# Patient Record
Sex: FEMALE | Race: WHITE | NOT HISPANIC OR LATINO | Employment: FULL TIME | URBAN - METROPOLITAN AREA
[De-identification: names, ages, dates, MRNs, and addresses within clinical notes are randomized per-mention and may not be internally consistent; named-entity substitution may affect disease eponyms.]

---

## 2017-09-07 ENCOUNTER — TRANSCRIBE ORDERS (OUTPATIENT)
Dept: ADMINISTRATIVE | Facility: HOSPITAL | Age: 47
End: 2017-09-07

## 2017-09-07 DIAGNOSIS — M54.16 LUMBAR RADICULITIS: Primary | ICD-10-CM

## 2018-04-30 ENCOUNTER — TRANSCRIBE ORDERS (OUTPATIENT)
Dept: ADMINISTRATIVE | Facility: HOSPITAL | Age: 48
End: 2018-04-30

## 2018-04-30 DIAGNOSIS — Z12.31 VISIT FOR SCREENING MAMMOGRAM: Primary | ICD-10-CM

## 2018-05-02 ENCOUNTER — HOSPITAL ENCOUNTER (OUTPATIENT)
Dept: MAMMOGRAPHY | Facility: HOSPITAL | Age: 48
Discharge: HOME | End: 2018-05-02
Attending: INTERNAL MEDICINE | Admitting: INTERNAL MEDICINE

## 2018-05-02 DIAGNOSIS — Z12.31 VISIT FOR SCREENING MAMMOGRAM: ICD-10-CM

## 2018-05-02 PROCEDURE — 77063 BREAST TOMOSYNTHESIS BI: CPT

## 2018-05-02 PROCEDURE — 77067 SCR MAMMO BI INCL CAD: CPT | Performed by: RADIOLOGY

## 2018-05-02 PROCEDURE — 77063 BREAST TOMOSYNTHESIS BI: CPT | Performed by: RADIOLOGY

## 2018-05-02 PROCEDURE — 77067 SCR MAMMO BI INCL CAD: CPT

## 2018-06-01 ENCOUNTER — TELEPHONE (OUTPATIENT)
Dept: FAMILY MEDICINE CLINIC | Facility: CLINIC | Age: 48
End: 2018-06-01

## 2018-06-01 ENCOUNTER — APPOINTMENT (OUTPATIENT)
Dept: GENERAL RADIOLOGY | Facility: HOSPITAL | Age: 48
End: 2018-06-01

## 2018-06-01 ENCOUNTER — APPOINTMENT (OUTPATIENT)
Dept: CT IMAGING | Facility: HOSPITAL | Age: 48
End: 2018-06-01

## 2018-06-01 ENCOUNTER — HOSPITAL ENCOUNTER (OUTPATIENT)
Facility: HOSPITAL | Age: 48
Discharge: HOME | End: 2018-06-04
Attending: EMERGENCY MEDICINE | Admitting: INTERNAL MEDICINE

## 2018-06-01 DIAGNOSIS — R09.02 HYPOXIA: ICD-10-CM

## 2018-06-01 DIAGNOSIS — R10.826 EPIGASTRIC ABDOMINAL TENDERNESS WITH REBOUND TENDERNESS: ICD-10-CM

## 2018-06-01 DIAGNOSIS — Z98.84 HX OF GASTRIC BYPASS: ICD-10-CM

## 2018-06-01 DIAGNOSIS — R52 INTRACTABLE PAIN: Primary | ICD-10-CM

## 2018-06-01 DIAGNOSIS — R07.89 ATYPICAL CHEST PAIN: ICD-10-CM

## 2018-06-01 DIAGNOSIS — R11.0 NAUSEA: ICD-10-CM

## 2018-06-01 DIAGNOSIS — R07.9 CHEST PAIN, UNSPECIFIED TYPE: ICD-10-CM

## 2018-06-01 DIAGNOSIS — J96.01 ACUTE RESPIRATORY FAILURE WITH HYPOXIA (HCC): ICD-10-CM

## 2018-06-01 PROBLEM — E66.9 OBESITY: Chronic | Status: ACTIVE | Noted: 2018-06-01

## 2018-06-01 PROBLEM — G89.29 CHRONIC PAIN: Chronic | Status: ACTIVE | Noted: 2018-06-01

## 2018-06-01 PROBLEM — F32.A DEPRESSION: Chronic | Status: ACTIVE | Noted: 2018-06-01

## 2018-06-01 LAB
ALBUMIN SERPL-MCNC: 4.3 G/DL (ref 3.2–4.8)
ALBUMIN/GLOB SERPL: 1.3 G/DL (ref 1.5–2.5)
ALP SERPL-CCNC: 102 U/L (ref 25–100)
ALT SERPL W P-5'-P-CCNC: 23 U/L (ref 7–40)
ANION GAP SERPL CALCULATED.3IONS-SCNC: 9 MMOL/L (ref 3–11)
AST SERPL-CCNC: 29 U/L (ref 0–33)
B-HCG UR QL: NEGATIVE
BASOPHILS # BLD AUTO: 0.03 10*3/MM3 (ref 0–0.2)
BASOPHILS NFR BLD AUTO: 0.4 % (ref 0–1)
BILIRUB SERPL-MCNC: 0.4 MG/DL (ref 0.3–1.2)
BNP SERPL-MCNC: 9 PG/ML (ref 0–100)
BUN BLD-MCNC: 20 MG/DL (ref 9–23)
BUN/CREAT SERPL: 22.2 (ref 7–25)
CALCIUM SPEC-SCNC: 9.4 MG/DL (ref 8.7–10.4)
CHLORIDE SERPL-SCNC: 104 MMOL/L (ref 99–109)
CO2 SERPL-SCNC: 26 MMOL/L (ref 20–31)
CREAT BLD-MCNC: 0.9 MG/DL (ref 0.6–1.3)
D DIMER PPP FEU-MCNC: 0.26 MG/L (FEU) (ref 0–0.5)
DEPRECATED RDW RBC AUTO: 37.5 FL (ref 37–54)
EOSINOPHIL # BLD AUTO: 0.21 10*3/MM3 (ref 0–0.3)
EOSINOPHIL NFR BLD AUTO: 3 % (ref 0–3)
ERYTHROCYTE [DISTWIDTH] IN BLOOD BY AUTOMATED COUNT: 12.4 % (ref 11.3–14.5)
GFR SERPL CREATININE-BSD FRML MDRD: 67 ML/MIN/1.73
GLOBULIN UR ELPH-MCNC: 3.2 GM/DL
GLUCOSE BLD-MCNC: 96 MG/DL (ref 70–100)
HCT VFR BLD AUTO: 43.7 % (ref 34.5–44)
HGB BLD-MCNC: 15.4 G/DL (ref 11.5–15.5)
HOLD SPECIMEN: NORMAL
HOLD SPECIMEN: NORMAL
IMM GRANULOCYTES # BLD: 0.01 10*3/MM3 (ref 0–0.03)
IMM GRANULOCYTES NFR BLD: 0.1 % (ref 0–0.6)
INTERNAL NEGATIVE CONTROL: NEGATIVE
INTERNAL POSITIVE CONTROL: POSITIVE
LIPASE SERPL-CCNC: 29 U/L (ref 6–51)
LYMPHOCYTES # BLD AUTO: 2.66 10*3/MM3 (ref 0.6–4.8)
LYMPHOCYTES NFR BLD AUTO: 38.2 % (ref 24–44)
Lab: NORMAL
MCH RBC QN AUTO: 29.4 PG (ref 27–31)
MCHC RBC AUTO-ENTMCNC: 35.2 G/DL (ref 32–36)
MCV RBC AUTO: 83.6 FL (ref 80–99)
MONOCYTES # BLD AUTO: 0.39 10*3/MM3 (ref 0–1)
MONOCYTES NFR BLD AUTO: 5.6 % (ref 0–12)
NEUTROPHILS # BLD AUTO: 3.68 10*3/MM3 (ref 1.5–8.3)
NEUTROPHILS NFR BLD AUTO: 52.8 % (ref 41–71)
PLATELET # BLD AUTO: 229 10*3/MM3 (ref 150–450)
PMV BLD AUTO: 10.6 FL (ref 6–12)
POTASSIUM BLD-SCNC: 4.4 MMOL/L (ref 3.5–5.5)
PROT SERPL-MCNC: 7.5 G/DL (ref 5.7–8.2)
RBC # BLD AUTO: 5.23 10*6/MM3 (ref 3.89–5.14)
SODIUM BLD-SCNC: 139 MMOL/L (ref 132–146)
TROPONIN I SERPL-MCNC: 0 NG/ML (ref 0–0.07)
TROPONIN I SERPL-MCNC: 0 NG/ML (ref 0–0.07)
WBC NRBC COR # BLD: 6.97 10*3/MM3 (ref 3.5–10.8)
WHOLE BLOOD HOLD SPECIMEN: NORMAL
WHOLE BLOOD HOLD SPECIMEN: NORMAL

## 2018-06-01 PROCEDURE — 99285 EMERGENCY DEPT VISIT HI MDM: CPT

## 2018-06-01 PROCEDURE — 25010000002 HYDROMORPHONE PER 4 MG: Performed by: EMERGENCY MEDICINE

## 2018-06-01 PROCEDURE — 71045 X-RAY EXAM CHEST 1 VIEW: CPT

## 2018-06-01 PROCEDURE — 83690 ASSAY OF LIPASE: CPT | Performed by: EMERGENCY MEDICINE

## 2018-06-01 PROCEDURE — 96375 TX/PRO/DX INJ NEW DRUG ADDON: CPT

## 2018-06-01 PROCEDURE — 25010000002 FENTANYL CITRATE (PF) 100 MCG/2ML SOLUTION: Performed by: EMERGENCY MEDICINE

## 2018-06-01 PROCEDURE — G0378 HOSPITAL OBSERVATION PER HR: HCPCS

## 2018-06-01 PROCEDURE — 85379 FIBRIN DEGRADATION QUANT: CPT | Performed by: PHYSICIAN ASSISTANT

## 2018-06-01 PROCEDURE — 25010000002 DICYCLOMINE PER 20 MG: Performed by: PHYSICIAN ASSISTANT

## 2018-06-01 PROCEDURE — 94760 N-INVAS EAR/PLS OXIMETRY 1: CPT

## 2018-06-01 PROCEDURE — 80053 COMPREHEN METABOLIC PANEL: CPT | Performed by: EMERGENCY MEDICINE

## 2018-06-01 PROCEDURE — 93005 ELECTROCARDIOGRAM TRACING: CPT | Performed by: EMERGENCY MEDICINE

## 2018-06-01 PROCEDURE — 84484 ASSAY OF TROPONIN QUANT: CPT

## 2018-06-01 PROCEDURE — 93005 ELECTROCARDIOGRAM TRACING: CPT | Performed by: PHYSICIAN ASSISTANT

## 2018-06-01 PROCEDURE — 99220 PR INITIAL OBSERVATION CARE/DAY 70 MINUTES: CPT | Performed by: INTERNAL MEDICINE

## 2018-06-01 PROCEDURE — 84145 PROCALCITONIN (PCT): CPT | Performed by: NURSE PRACTITIONER

## 2018-06-01 PROCEDURE — 25010000002 ONDANSETRON PER 1 MG: Performed by: EMERGENCY MEDICINE

## 2018-06-01 PROCEDURE — 0 IOPAMIDOL PER 1 ML: Performed by: EMERGENCY MEDICINE

## 2018-06-01 PROCEDURE — 85025 COMPLETE CBC W/AUTO DIFF WBC: CPT | Performed by: EMERGENCY MEDICINE

## 2018-06-01 PROCEDURE — 71275 CT ANGIOGRAPHY CHEST: CPT

## 2018-06-01 PROCEDURE — 82550 ASSAY OF CK (CPK): CPT | Performed by: NURSE PRACTITIONER

## 2018-06-01 PROCEDURE — 96374 THER/PROPH/DIAG INJ IV PUSH: CPT

## 2018-06-01 PROCEDURE — 94640 AIRWAY INHALATION TREATMENT: CPT

## 2018-06-01 PROCEDURE — 94799 UNLISTED PULMONARY SVC/PX: CPT

## 2018-06-01 PROCEDURE — 83880 ASSAY OF NATRIURETIC PEPTIDE: CPT | Performed by: EMERGENCY MEDICINE

## 2018-06-01 PROCEDURE — 96372 THER/PROPH/DIAG INJ SC/IM: CPT

## 2018-06-01 RX ORDER — FENTANYL CITRATE 50 UG/ML
50 INJECTION, SOLUTION INTRAMUSCULAR; INTRAVENOUS ONCE
Status: COMPLETED | OUTPATIENT
Start: 2018-06-01 | End: 2018-06-01

## 2018-06-01 RX ORDER — IPRATROPIUM BROMIDE AND ALBUTEROL SULFATE 2.5; .5 MG/3ML; MG/3ML
3 SOLUTION RESPIRATORY (INHALATION) ONCE
Status: COMPLETED | OUTPATIENT
Start: 2018-06-01 | End: 2018-06-01

## 2018-06-01 RX ORDER — HYDROMORPHONE HYDROCHLORIDE 1 MG/ML
0.5 INJECTION, SOLUTION INTRAMUSCULAR; INTRAVENOUS; SUBCUTANEOUS ONCE
Status: COMPLETED | OUTPATIENT
Start: 2018-06-01 | End: 2018-06-01

## 2018-06-01 RX ORDER — ALUMINA, MAGNESIA, AND SIMETHICONE 2400; 2400; 240 MG/30ML; MG/30ML; MG/30ML
15 SUSPENSION ORAL ONCE
Status: COMPLETED | OUTPATIENT
Start: 2018-06-01 | End: 2018-06-01

## 2018-06-01 RX ORDER — DIAZEPAM 5 MG/1
5 TABLET ORAL ONCE
Status: COMPLETED | OUTPATIENT
Start: 2018-06-01 | End: 2018-06-01

## 2018-06-01 RX ORDER — NITROGLYCERIN 0.4 MG/1
0.4 TABLET SUBLINGUAL
Status: DISCONTINUED | OUTPATIENT
Start: 2018-06-01 | End: 2018-06-04 | Stop reason: HOSPADM

## 2018-06-01 RX ORDER — GABAPENTIN 600 MG/1
600 TABLET ORAL 2 TIMES DAILY
COMMUNITY
End: 2019-05-02

## 2018-06-01 RX ORDER — SODIUM CHLORIDE 0.9 % (FLUSH) 0.9 %
10 SYRINGE (ML) INJECTION AS NEEDED
Status: DISCONTINUED | OUTPATIENT
Start: 2018-06-01 | End: 2018-06-04 | Stop reason: HOSPADM

## 2018-06-01 RX ORDER — ASPIRIN 81 MG/1
324 TABLET, CHEWABLE ORAL ONCE
Status: COMPLETED | OUTPATIENT
Start: 2018-06-01 | End: 2018-06-01

## 2018-06-01 RX ORDER — TEMAZEPAM 15 MG/1
15 CAPSULE ORAL AS NEEDED
COMMUNITY
End: 2019-05-14 | Stop reason: SDUPTHER

## 2018-06-01 RX ORDER — DICYCLOMINE HYDROCHLORIDE 10 MG/ML
20 INJECTION INTRAMUSCULAR ONCE
Status: COMPLETED | OUTPATIENT
Start: 2018-06-01 | End: 2018-06-01

## 2018-06-01 RX ORDER — PANTOPRAZOLE SODIUM 40 MG/1
40 TABLET, DELAYED RELEASE ORAL ONCE
Status: COMPLETED | OUTPATIENT
Start: 2018-06-01 | End: 2018-06-01

## 2018-06-01 RX ORDER — HYDROMORPHONE HYDROCHLORIDE 1 MG/ML
0.5 INJECTION, SOLUTION INTRAMUSCULAR; INTRAVENOUS; SUBCUTANEOUS ONCE
Status: DISCONTINUED | OUTPATIENT
Start: 2018-06-01 | End: 2018-06-01

## 2018-06-01 RX ORDER — ONDANSETRON 2 MG/ML
4 INJECTION INTRAMUSCULAR; INTRAVENOUS ONCE
Status: COMPLETED | OUTPATIENT
Start: 2018-06-01 | End: 2018-06-01

## 2018-06-01 RX ADMIN — PANTOPRAZOLE SODIUM 40 MG: 40 TABLET, DELAYED RELEASE ORAL at 21:51

## 2018-06-01 RX ADMIN — FENTANYL CITRATE 50 MCG: 50 INJECTION, SOLUTION INTRAMUSCULAR; INTRAVENOUS at 17:57

## 2018-06-01 RX ADMIN — DICYCLOMINE HYDROCHLORIDE 20 MG: 20 INJECTION, SOLUTION INTRAMUSCULAR at 21:48

## 2018-06-01 RX ADMIN — ONDANSETRON 4 MG: 2 INJECTION INTRAMUSCULAR; INTRAVENOUS at 17:23

## 2018-06-01 RX ADMIN — IPRATROPIUM BROMIDE AND ALBUTEROL SULFATE 3 ML: 2.5; .5 SOLUTION RESPIRATORY (INHALATION) at 21:40

## 2018-06-01 RX ADMIN — IOPAMIDOL 95 ML: 755 INJECTION, SOLUTION INTRAVENOUS at 18:34

## 2018-06-01 RX ADMIN — SODIUM CHLORIDE 500 ML: 9 INJECTION, SOLUTION INTRAVENOUS at 16:42

## 2018-06-01 RX ADMIN — ALUMINUM HYDROXIDE, MAGNESIUM HYDROXIDE, AND DIMETHICONE 15 ML: 400; 400; 40 SUSPENSION ORAL at 20:07

## 2018-06-01 RX ADMIN — LIDOCAINE HYDROCHLORIDE 15 ML: 20 SOLUTION ORAL; TOPICAL at 20:07

## 2018-06-01 RX ADMIN — DIAZEPAM 5 MG: 5 TABLET ORAL at 23:45

## 2018-06-01 RX ADMIN — HYDROMORPHONE HYDROCHLORIDE 0.5 MG: 1 INJECTION, SOLUTION INTRAMUSCULAR; INTRAVENOUS; SUBCUTANEOUS at 17:23

## 2018-06-01 RX ADMIN — ASPIRIN 81 MG CHEWABLE TABLET 324 MG: 81 TABLET CHEWABLE at 16:16

## 2018-06-01 NOTE — TELEPHONE ENCOUNTER
ANATOLIY: Patient called and said that she is on her way to the ER with chest pain. Wanted to let Jennyfer know that she is going to [facility] ER.

## 2018-06-02 ENCOUNTER — APPOINTMENT (OUTPATIENT)
Dept: CARDIOLOGY | Facility: HOSPITAL | Age: 48
End: 2018-06-02

## 2018-06-02 PROBLEM — R07.89 ATYPICAL CHEST PAIN: Status: ACTIVE | Noted: 2018-06-01

## 2018-06-02 PROBLEM — J96.01 ACUTE RESPIRATORY FAILURE WITH HYPOXIA (HCC): Status: ACTIVE | Noted: 2018-06-02

## 2018-06-02 PROBLEM — R10.826 EPIGASTRIC ABDOMINAL TENDERNESS WITH REBOUND TENDERNESS: Status: ACTIVE | Noted: 2018-06-01

## 2018-06-02 PROBLEM — R11.0 NAUSEA: Status: ACTIVE | Noted: 2018-06-01

## 2018-06-02 LAB
AMYLASE SERPL-CCNC: 97 U/L (ref 30–118)
BH CV ECHO MEAS - AO MAX PG (FULL): 1.9 MMHG
BH CV ECHO MEAS - AO MAX PG: 7 MMHG
BH CV ECHO MEAS - AO ROOT AREA (BSA CORRECTED): 1.3
BH CV ECHO MEAS - AO ROOT AREA: 6.9 CM^2
BH CV ECHO MEAS - AO ROOT DIAM: 3 CM
BH CV ECHO MEAS - AO V2 MAX: 130.3 CM/SEC
BH CV ECHO MEAS - AVA(V,A): 2.8 CM^2
BH CV ECHO MEAS - AVA(V,D): 2.8 CM^2
BH CV ECHO MEAS - BSA(HAYCOCK): 2.4 M^2
BH CV ECHO MEAS - BSA: 2.3 M^2
BH CV ECHO MEAS - BZI_BMI: 45.1 KILOGRAMS/M^2
BH CV ECHO MEAS - BZI_METRIC_HEIGHT: 165.1 CM
BH CV ECHO MEAS - BZI_METRIC_WEIGHT: 122.9 KG
BH CV ECHO MEAS - CONTRAST EF (2CH): 74.5 ML/M^2
BH CV ECHO MEAS - CONTRAST EF 4CH: 70.3 ML/M^2
BH CV ECHO MEAS - EDV(CUBED): 103.4 ML
BH CV ECHO MEAS - EDV(MOD-SP2): 94 ML
BH CV ECHO MEAS - EDV(MOD-SP4): 101 ML
BH CV ECHO MEAS - EDV(TEICH): 102 ML
BH CV ECHO MEAS - EF(CUBED): 83.8 %
BH CV ECHO MEAS - EF(MOD-BP): 70 %
BH CV ECHO MEAS - EF(MOD-SP2): 74.5 %
BH CV ECHO MEAS - EF(MOD-SP4): 70.3 %
BH CV ECHO MEAS - EF(TEICH): 76.9 %
BH CV ECHO MEAS - ESV(CUBED): 16.7 ML
BH CV ECHO MEAS - ESV(MOD-SP2): 24 ML
BH CV ECHO MEAS - ESV(MOD-SP4): 30 ML
BH CV ECHO MEAS - ESV(TEICH): 23.6 ML
BH CV ECHO MEAS - FS: 45.5 %
BH CV ECHO MEAS - IVS/LVPW: 0.99
BH CV ECHO MEAS - IVSD: 0.8 CM
BH CV ECHO MEAS - LA DIMENSION: 3.5 CM
BH CV ECHO MEAS - LA/AO: 1.2
BH CV ECHO MEAS - LAT PEAK E' VEL: 12.6 CM/SEC
BH CV ECHO MEAS - LV DIASTOLIC VOL/BSA (35-75): 44.9 ML/M^2
BH CV ECHO MEAS - LV MASS(C)D: 123.3 GRAMS
BH CV ECHO MEAS - LV MASS(C)DI: 54.8 GRAMS/M^2
BH CV ECHO MEAS - LV MAX PG: 5.1 MMHG
BH CV ECHO MEAS - LV SYSTOLIC VOL/BSA (12-30): 13.3 ML/M^2
BH CV ECHO MEAS - LV V1 MAX: 113 CM/SEC
BH CV ECHO MEAS - LVIDD: 4.7 CM
BH CV ECHO MEAS - LVIDS: 3 CM
BH CV ECHO MEAS - LVLD AP2: 7.8 CM
BH CV ECHO MEAS - LVLD AP4: 7.9 CM
BH CV ECHO MEAS - LVLS AP2: 6.1 CM
BH CV ECHO MEAS - LVLS AP4: 6.4 CM
BH CV ECHO MEAS - LVOT AREA (M): 3.1 CM^2
BH CV ECHO MEAS - LVOT AREA: 3.2 CM^2
BH CV ECHO MEAS - LVOT DIAM: 2 CM
BH CV ECHO MEAS - LVPWD: 0.8 CM
BH CV ECHO MEAS - MED PEAK E' VEL: 8.23 CM/SEC
BH CV ECHO MEAS - MV A MAX VEL: 68.3 CM/SEC
BH CV ECHO MEAS - MV DEC TIME: 0.17 SEC
BH CV ECHO MEAS - MV E MAX VEL: 97.2 CM/SEC
BH CV ECHO MEAS - MV E/A: 1.4
BH CV ECHO MEAS - PA ACC SLOPE: 498.2 CM/SEC^2
BH CV ECHO MEAS - PA ACC TIME: 0.13 SEC
BH CV ECHO MEAS - PA PR(ACCEL): 18.4 MMHG
BH CV ECHO MEAS - RVDD: 2.8 CM
BH CV ECHO MEAS - RVSP: 22 MMHG
BH CV ECHO MEAS - SI(CUBED): 38.5 ML/M^2
BH CV ECHO MEAS - SI(MOD-SP2): 31.1 ML/M^2
BH CV ECHO MEAS - SI(MOD-SP4): 31.5 ML/M^2
BH CV ECHO MEAS - SI(TEICH): 34.8 ML/M^2
BH CV ECHO MEAS - SV(CUBED): 86.7 ML
BH CV ECHO MEAS - SV(MOD-SP2): 70 ML
BH CV ECHO MEAS - SV(MOD-SP4): 71 ML
BH CV ECHO MEAS - SV(TEICH): 78.4 ML
BH CV ECHO MEAS - TAPSE (>1.6): 2.1 CM2
BH CV ECHO MEAS - TR MAX V: 19 MMHG
BH CV ECHO MEAS - TR MAX VEL: 208.1 CM/SEC
BH CV ECHO MEASUREMENTS AVERAGE E/E' RATIO: 9.33
BH CV NUCLEAR PRIOR STUDY: 2
BH CV STRESS BP STAGE 1: NORMAL
BH CV STRESS BP STAGE 3: NORMAL
BH CV STRESS COMMENTS STAGE 1: NORMAL
BH CV STRESS DOSE REGADENOSON STAGE 1: 0.4
BH CV STRESS DURATION MIN STAGE 1: 1
BH CV STRESS DURATION MIN STAGE 2: 1
BH CV STRESS DURATION MIN STAGE 3: 1
BH CV STRESS DURATION MIN STAGE 4: 1
BH CV STRESS DURATION SEC STAGE 1: 0
BH CV STRESS DURATION SEC STAGE 2: 0
BH CV STRESS DURATION SEC STAGE 3: 0
BH CV STRESS DURATION SEC STAGE 4: 0
BH CV STRESS HR STAGE 1: 88
BH CV STRESS HR STAGE 2: 85
BH CV STRESS HR STAGE 3: 77
BH CV STRESS HR STAGE 4: 75
BH CV STRESS PROTOCOL 1: NORMAL
BH CV STRESS RECOVERY BP: NORMAL MMHG
BH CV STRESS RECOVERY HR: 70 BPM
BH CV STRESS RECOVERY O2: 99 %
BH CV STRESS STAGE 1: 1
BH CV STRESS STAGE 2: 2
BH CV STRESS STAGE 3: 3
BH CV STRESS STAGE 4: 4
BH CV VAS BP LEFT ARM: NORMAL MMHG
BH CV XLRA - RV BASE: 3.6 CM
BH CV XLRA - RV LENGTH: 7.1 CM
BH CV XLRA - RV MID: 3.1 CM
BH CV XLRA - TDI S': 14.4 CM/SEC
BILIRUB UR QL STRIP: NEGATIVE
CA-I SERPL ISE-MCNC: 1.21 MMOL/L (ref 1.12–1.32)
CK SERPL-CCNC: 121 U/L (ref 26–174)
CLARITY UR: CLEAR
COLOR UR: YELLOW
GLUCOSE UR STRIP-MCNC: NEGATIVE MG/DL
HBA1C MFR BLD: 6 % (ref 4.8–5.6)
HGB UR QL STRIP.AUTO: NEGATIVE
KETONES UR QL STRIP: ABNORMAL
LEFT ATRIUM VOLUME INDEX: 22.2 ML/M2
LEUKOCYTE ESTERASE UR QL STRIP.AUTO: NEGATIVE
LIPASE SERPL-CCNC: 94 U/L (ref 6–51)
LV EF 2D ECHO EST: 66 %
LV EF NUC BP: 70 %
MAGNESIUM SERPL-MCNC: 2 MG/DL (ref 1.3–2.7)
MAXIMAL PREDICTED HEART RATE: 171 BPM
MAXIMAL PREDICTED HEART RATE: 171 BPM
NITRITE UR QL STRIP: NEGATIVE
PERCENT MAX PREDICTED HR: 51.46 %
PH UR STRIP.AUTO: 7 [PH] (ref 5–8)
PROCALCITONIN SERPL-MCNC: <0.05 NG/ML
PROT UR QL STRIP: NEGATIVE
SP GR UR STRIP: 1.06 (ref 1–1.03)
STRESS BASELINE BP: NORMAL MMHG
STRESS BASELINE HR: 55 BPM
STRESS O2 SAT REST: 98 %
STRESS PERCENT HR: 61 %
STRESS POST O2 SAT PEAK: 100 %
STRESS POST PEAK BP: NORMAL MMHG
STRESS POST PEAK HR: 88 BPM
STRESS TARGET HR: 145 BPM
STRESS TARGET HR: 145 BPM
TROPONIN I SERPL-MCNC: <0.006 NG/ML
TSH SERPL DL<=0.05 MIU/L-ACNC: 4.76 MIU/ML (ref 0.35–5.35)
UROBILINOGEN UR QL STRIP: ABNORMAL

## 2018-06-02 PROCEDURE — 96376 TX/PRO/DX INJ SAME DRUG ADON: CPT

## 2018-06-02 PROCEDURE — 93017 CV STRESS TEST TRACING ONLY: CPT

## 2018-06-02 PROCEDURE — 84484 ASSAY OF TROPONIN QUANT: CPT | Performed by: NURSE PRACTITIONER

## 2018-06-02 PROCEDURE — 25010000002 ONDANSETRON PER 1 MG: Performed by: NURSE PRACTITIONER

## 2018-06-02 PROCEDURE — 93018 CV STRESS TEST I&R ONLY: CPT | Performed by: INTERNAL MEDICINE

## 2018-06-02 PROCEDURE — 78492 MYOCRD IMG PET MLT RST&STRS: CPT

## 2018-06-02 PROCEDURE — G0378 HOSPITAL OBSERVATION PER HR: HCPCS

## 2018-06-02 PROCEDURE — 25010000002 HYDROMORPHONE PER 4 MG: Performed by: NURSE PRACTITIONER

## 2018-06-02 PROCEDURE — 93306 TTE W/DOPPLER COMPLETE: CPT

## 2018-06-02 PROCEDURE — 81003 URINALYSIS AUTO W/O SCOPE: CPT | Performed by: NURSE PRACTITIONER

## 2018-06-02 PROCEDURE — 82150 ASSAY OF AMYLASE: CPT | Performed by: NURSE PRACTITIONER

## 2018-06-02 PROCEDURE — 78492 MYOCRD IMG PET MLT RST&STRS: CPT | Performed by: INTERNAL MEDICINE

## 2018-06-02 PROCEDURE — A9555 RB82 RUBIDIUM: HCPCS | Performed by: INTERNAL MEDICINE

## 2018-06-02 PROCEDURE — 99243 OFF/OP CNSLTJ NEW/EST LOW 30: CPT | Performed by: INTERNAL MEDICINE

## 2018-06-02 PROCEDURE — 0 RUBIDIUM CHLORIDE: Performed by: INTERNAL MEDICINE

## 2018-06-02 PROCEDURE — 83036 HEMOGLOBIN GLYCOSYLATED A1C: CPT | Performed by: NURSE PRACTITIONER

## 2018-06-02 PROCEDURE — 82330 ASSAY OF CALCIUM: CPT | Performed by: NURSE PRACTITIONER

## 2018-06-02 PROCEDURE — 83735 ASSAY OF MAGNESIUM: CPT | Performed by: NURSE PRACTITIONER

## 2018-06-02 PROCEDURE — 96361 HYDRATE IV INFUSION ADD-ON: CPT

## 2018-06-02 PROCEDURE — 87633 RESP VIRUS 12-25 TARGETS: CPT | Performed by: NURSE PRACTITIONER

## 2018-06-02 PROCEDURE — 63710000001 PREDNISONE PER 1 MG: Performed by: INTERNAL MEDICINE

## 2018-06-02 PROCEDURE — 25010000002 REGADENOSON 0.4 MG/5ML SOLUTION: Performed by: NURSE PRACTITIONER

## 2018-06-02 PROCEDURE — 84443 ASSAY THYROID STIM HORMONE: CPT | Performed by: NURSE PRACTITIONER

## 2018-06-02 PROCEDURE — 93010 ELECTROCARDIOGRAM REPORT: CPT | Performed by: INTERNAL MEDICINE

## 2018-06-02 PROCEDURE — 83690 ASSAY OF LIPASE: CPT | Performed by: NURSE PRACTITIONER

## 2018-06-02 PROCEDURE — 93306 TTE W/DOPPLER COMPLETE: CPT | Performed by: INTERNAL MEDICINE

## 2018-06-02 PROCEDURE — 93005 ELECTROCARDIOGRAM TRACING: CPT | Performed by: NURSE PRACTITIONER

## 2018-06-02 PROCEDURE — 99226 PR SBSQ OBSERVATION CARE/DAY 35 MINUTES: CPT | Performed by: INTERNAL MEDICINE

## 2018-06-02 RX ORDER — PREDNISONE 20 MG/1
40 TABLET ORAL
Status: DISCONTINUED | OUTPATIENT
Start: 2018-06-02 | End: 2018-06-04 | Stop reason: HOSPADM

## 2018-06-02 RX ORDER — METHOCARBAMOL 500 MG/1
500 TABLET, FILM COATED ORAL 2 TIMES DAILY PRN
Status: DISCONTINUED | OUTPATIENT
Start: 2018-06-02 | End: 2018-06-04 | Stop reason: HOSPADM

## 2018-06-02 RX ORDER — DIAZEPAM 5 MG/1
5 TABLET ORAL 2 TIMES DAILY PRN
Status: DISCONTINUED | OUTPATIENT
Start: 2018-06-02 | End: 2018-06-04 | Stop reason: HOSPADM

## 2018-06-02 RX ORDER — CITALOPRAM 40 MG/1
40 TABLET ORAL DAILY
Status: DISCONTINUED | OUTPATIENT
Start: 2018-06-02 | End: 2018-06-04 | Stop reason: HOSPADM

## 2018-06-02 RX ORDER — BUPROPION HYDROCHLORIDE 150 MG/1
300 TABLET, EXTENDED RELEASE ORAL DAILY
Status: DISCONTINUED | OUTPATIENT
Start: 2018-06-02 | End: 2018-06-04 | Stop reason: HOSPADM

## 2018-06-02 RX ORDER — PANTOPRAZOLE SODIUM 40 MG/1
40 TABLET, DELAYED RELEASE ORAL
Status: DISCONTINUED | OUTPATIENT
Start: 2018-06-02 | End: 2018-06-04 | Stop reason: HOSPADM

## 2018-06-02 RX ORDER — ONDANSETRON 4 MG/1
4 TABLET, FILM COATED ORAL EVERY 6 HOURS PRN
Status: DISCONTINUED | OUTPATIENT
Start: 2018-06-02 | End: 2018-06-04 | Stop reason: HOSPADM

## 2018-06-02 RX ORDER — SODIUM CHLORIDE 0.9 % (FLUSH) 0.9 %
1-10 SYRINGE (ML) INJECTION AS NEEDED
Status: DISCONTINUED | OUTPATIENT
Start: 2018-06-02 | End: 2018-06-04 | Stop reason: HOSPADM

## 2018-06-02 RX ORDER — ONDANSETRON 2 MG/ML
4 INJECTION INTRAMUSCULAR; INTRAVENOUS EVERY 6 HOURS PRN
Status: DISCONTINUED | OUTPATIENT
Start: 2018-06-02 | End: 2018-06-04 | Stop reason: HOSPADM

## 2018-06-02 RX ORDER — FAMOTIDINE 20 MG/1
20 TABLET, FILM COATED ORAL 2 TIMES DAILY
Status: DISCONTINUED | OUTPATIENT
Start: 2018-06-02 | End: 2018-06-04 | Stop reason: HOSPADM

## 2018-06-02 RX ORDER — DOCUSATE SODIUM 100 MG/1
100 CAPSULE, LIQUID FILLED ORAL 2 TIMES DAILY PRN
Status: DISCONTINUED | OUTPATIENT
Start: 2018-06-02 | End: 2018-06-04 | Stop reason: HOSPADM

## 2018-06-02 RX ORDER — TEMAZEPAM 15 MG/1
15 CAPSULE ORAL AS NEEDED
Status: DISCONTINUED | OUTPATIENT
Start: 2018-06-02 | End: 2018-06-04 | Stop reason: HOSPADM

## 2018-06-02 RX ORDER — SODIUM CHLORIDE 9 MG/ML
100 INJECTION, SOLUTION INTRAVENOUS CONTINUOUS
Status: DISCONTINUED | OUTPATIENT
Start: 2018-06-02 | End: 2018-06-04 | Stop reason: HOSPADM

## 2018-06-02 RX ORDER — HYDROMORPHONE HYDROCHLORIDE 1 MG/ML
0.5 INJECTION, SOLUTION INTRAMUSCULAR; INTRAVENOUS; SUBCUTANEOUS
Status: DISCONTINUED | OUTPATIENT
Start: 2018-06-02 | End: 2018-06-04 | Stop reason: HOSPADM

## 2018-06-02 RX ORDER — PROMETHAZINE HYDROCHLORIDE 25 MG/ML
12.5 INJECTION, SOLUTION INTRAMUSCULAR; INTRAVENOUS EVERY 4 HOURS PRN
Status: DISCONTINUED | OUTPATIENT
Start: 2018-06-02 | End: 2018-06-04 | Stop reason: HOSPADM

## 2018-06-02 RX ORDER — SIMETHICONE 80 MG
80 TABLET,CHEWABLE ORAL 4 TIMES DAILY PRN
Status: DISCONTINUED | OUTPATIENT
Start: 2018-06-02 | End: 2018-06-04 | Stop reason: HOSPADM

## 2018-06-02 RX ORDER — ACETAMINOPHEN 325 MG/1
650 TABLET ORAL EVERY 4 HOURS PRN
Status: DISCONTINUED | OUTPATIENT
Start: 2018-06-02 | End: 2018-06-04 | Stop reason: HOSPADM

## 2018-06-02 RX ORDER — GABAPENTIN 300 MG/1
600 CAPSULE ORAL EVERY 12 HOURS SCHEDULED
Status: DISCONTINUED | OUTPATIENT
Start: 2018-06-02 | End: 2018-06-04 | Stop reason: HOSPADM

## 2018-06-02 RX ORDER — LIDOCAINE 50 MG/G
3 PATCH TOPICAL DAILY PRN
Status: DISCONTINUED | OUTPATIENT
Start: 2018-06-02 | End: 2018-06-04 | Stop reason: HOSPADM

## 2018-06-02 RX ADMIN — HYDROMORPHONE HYDROCHLORIDE 0.5 MG: 1 INJECTION, SOLUTION INTRAMUSCULAR; INTRAVENOUS; SUBCUTANEOUS at 12:35

## 2018-06-02 RX ADMIN — GABAPENTIN 600 MG: 300 CAPSULE ORAL at 09:28

## 2018-06-02 RX ADMIN — SODIUM CHLORIDE 100 ML/HR: 9 INJECTION, SOLUTION INTRAVENOUS at 15:22

## 2018-06-02 RX ADMIN — RUBIDIUM CHLORIDE RB-82 1 DOSE: 150 INJECTION, SOLUTION INTRAVENOUS at 08:40

## 2018-06-02 RX ADMIN — ACETAMINOPHEN 650 MG: 325 TABLET ORAL at 16:30

## 2018-06-02 RX ADMIN — GABAPENTIN 600 MG: 300 CAPSULE ORAL at 01:01

## 2018-06-02 RX ADMIN — REGADENOSON 0.4 MG: 0.08 INJECTION, SOLUTION INTRAVENOUS at 08:38

## 2018-06-02 RX ADMIN — METHOCARBAMOL 500 MG: 500 TABLET ORAL at 01:01

## 2018-06-02 RX ADMIN — TEMAZEPAM 15 MG: 15 CAPSULE ORAL at 01:01

## 2018-06-02 RX ADMIN — METHOCARBAMOL 500 MG: 500 TABLET ORAL at 17:58

## 2018-06-02 RX ADMIN — LIDOCAINE 2 PATCH: 50 PATCH CUTANEOUS at 20:59

## 2018-06-02 RX ADMIN — SODIUM CHLORIDE 100 ML/HR: 9 INJECTION, SOLUTION INTRAVENOUS at 01:54

## 2018-06-02 RX ADMIN — HYDROMORPHONE HYDROCHLORIDE 0.5 MG: 1 INJECTION, SOLUTION INTRAMUSCULAR; INTRAVENOUS; SUBCUTANEOUS at 09:27

## 2018-06-02 RX ADMIN — PANTOPRAZOLE SODIUM 40 MG: 40 TABLET, DELAYED RELEASE ORAL at 05:01

## 2018-06-02 RX ADMIN — LIDOCAINE 1 PATCH: 50 PATCH CUTANEOUS at 01:08

## 2018-06-02 RX ADMIN — HYDROMORPHONE HYDROCHLORIDE 0.5 MG: 1 INJECTION, SOLUTION INTRAMUSCULAR; INTRAVENOUS; SUBCUTANEOUS at 22:08

## 2018-06-02 RX ADMIN — SODIUM CHLORIDE 1000 ML: 9 INJECTION, SOLUTION INTRAVENOUS at 01:02

## 2018-06-02 RX ADMIN — PREDNISONE 40 MG: 20 TABLET ORAL at 16:30

## 2018-06-02 RX ADMIN — BUPROPION HYDROCHLORIDE 300 MG: 150 TABLET, EXTENDED RELEASE ORAL at 09:28

## 2018-06-02 RX ADMIN — ONDANSETRON 4 MG: 2 INJECTION INTRAMUSCULAR; INTRAVENOUS at 22:12

## 2018-06-02 RX ADMIN — RUBIDIUM CHLORIDE RB-82 1 DOSE: 150 INJECTION, SOLUTION INTRAVENOUS at 08:30

## 2018-06-02 RX ADMIN — ONDANSETRON 4 MG: 2 INJECTION INTRAMUSCULAR; INTRAVENOUS at 05:01

## 2018-06-02 RX ADMIN — FAMOTIDINE 20 MG: 20 TABLET ORAL at 20:58

## 2018-06-02 RX ADMIN — HYDROMORPHONE HYDROCHLORIDE 0.5 MG: 1 INJECTION, SOLUTION INTRAMUSCULAR; INTRAVENOUS; SUBCUTANEOUS at 17:58

## 2018-06-02 RX ADMIN — TEMAZEPAM 15 MG: 15 CAPSULE ORAL at 22:12

## 2018-06-02 RX ADMIN — LIDOCAINE 3 PATCH: 50 PATCH CUTANEOUS at 09:27

## 2018-06-02 RX ADMIN — HYDROMORPHONE HYDROCHLORIDE 0.5 MG: 1 INJECTION, SOLUTION INTRAMUSCULAR; INTRAVENOUS; SUBCUTANEOUS at 05:01

## 2018-06-02 RX ADMIN — HYDROMORPHONE HYDROCHLORIDE 0.5 MG: 1 INJECTION, SOLUTION INTRAMUSCULAR; INTRAVENOUS; SUBCUTANEOUS at 01:09

## 2018-06-02 RX ADMIN — ACETAMINOPHEN 650 MG: 325 TABLET ORAL at 09:27

## 2018-06-02 RX ADMIN — DIAZEPAM 5 MG: 5 TABLET ORAL at 22:08

## 2018-06-02 RX ADMIN — GABAPENTIN 600 MG: 300 CAPSULE ORAL at 20:58

## 2018-06-02 RX ADMIN — CITALOPRAM HYDROBROMIDE 40 MG: 40 TABLET ORAL at 09:28

## 2018-06-02 RX ADMIN — FAMOTIDINE 20 MG: 20 TABLET ORAL at 09:28

## 2018-06-02 NOTE — NURSING NOTE
0803 Paged ASIA Adamson  0806 ASIA Adamson rtnd page.  [id number] Pt states her BP has been running 90/30.

## 2018-06-02 NOTE — PLAN OF CARE
Problem: Patient Care Overview  Goal: Plan of Care Review  Outcome: Ongoing (interventions implemented as appropriate)   06/02/18 0542   Coping/Psychosocial   Plan of Care Reviewed With patient   Plan of Care Review   Progress no change   OTHER   Outcome Summary Pt admitted from the ED with c/o CP that feels like a spasm; pt states it only lasts about 3 seconds and goes away, but is sometimes accompanied by hot flashes and nausea. VSS. Pt remains on 2L NC due to O2 sats in the mid 80's. Pt has been NPO since midnight for stress test today. Pt to have echo today and GI to see pt as well. No other complaints at this time. Will continue to monitor.        Problem: Cardiac: ACS (Acute Coronary Syndrome) (Adult)  Goal: Signs and Symptoms of Listed Potential Problems Will be Absent, Minimized or Managed (Cardiac: ACS)  Outcome: Ongoing (interventions implemented as appropriate)

## 2018-06-02 NOTE — ED PROVIDER NOTES
I've seen and examined the patient.  Is a very pleasant 48-year-old he[place]  She has no history of lung issues that she knows of besides a pulmonary embolus after her surgery.  She has no asthma.  She is never had an EGD since her surgery.  She did have an echo around the time apparently was done after her pulmonary embolus was fairly unremarkable per her recollection.    Her current pain is chest pain that began at 1:00 last night comes and goes.  Last a few seconds and is miserable and goes away.  No precipitating activity Her.  She's never had a pain like it before.  Seems to be worse when she lays on her right side and better with which she lays on her left side but not associated with deep breathing.  She's had no nausea or vomiting with that she's had no bowel complaints is not passed blood per any orifice.    I reviewed all her records here and I cannot find an obvious clear cause of this.  She has a paucity of cardiac risk factors and I saw no calcifications of her coronary arteries on the CT scan and  she has no dissection or pulmonary embolus or evidence of pericardial or pleural effusion.  However I was able to follow her esophagus all the way up into her thorax which is little bit unusual.  Would favor some sort of smooth muscle spasm as the cause of this pain some type of antral ulcer after her remote gastric bypass.    Since she is still quite uncomfortable and she was hypoxic with an oxygen saturation of 87% on room air but she was awake and talking with us think she needs to be admitted for serial exams and carefully monitored.  Consideration of EGD tomorrow by GI also possible consideration of echo to evaluate for some sort of pulmonary hypertension given her habitus.    Suspect she also may have undiagnosed obstructive sleep apnea.    I spoke Dr. Burch, on-call hospital medicine, and she'll admit the patient.    All are agreeable with the plan     Boom RUSH  MD Fabio  06/01/18 2214       Boom Mccarthy MD  06/01/18 5597

## 2018-06-02 NOTE — ED PROVIDER NOTES
Subjective   Patient complaining of having chest pain that began yesterday.  The patient states the pain is almost spasmodic in nature in the left chest wall.  Patient reports that though does not radiate.  Patient reports that they would facilitate takes her breath distal little bit.  Patient had a history of DVTs in the past but no pulmonary emboli.  Patient reports she's had no abdominal pain associated with this.  She has had a history of gastric bypass had a Hanny-en-Y done about 2010.  Patient states that that she's had no melena hematochezia hematemesis.  Patient reports that she denies a dysuria for good tear urgency or hematuria associated with this.  Patient had no prior cardiac testing.  She denies a history of cardiac disease.  She quit smoking about 2 years ago.  She has had no family history of cardiac disease of the anybody in to the age of 59.  Seen one other time here for atypical chest pain but never had follow-up.        History provided by:  Patient and spouse   used: No    Chest Pain   Pain location:  L chest  Pain quality: aching and dull    Pain radiates to:  Does not radiate  Pain severity:  Moderate  Onset quality:  Gradual  Duration:  2 days  Timing:  Intermittent  Progression:  Waxing and waning  Chronicity:  New  Context: at rest    Context: not breathing, not eating, not intercourse, not lifting, not movement, not raising an arm, not stress and not trauma    Relieved by:  Nothing  Worsened by:  Nothing  Ineffective treatments:  Nitroglycerin  Associated symptoms: no abdominal pain, no AICD problem, no altered mental status, no anorexia, no anxiety, no back pain, no claudication, no cough, no diaphoresis, no dysphagia, no fever, no headache, no heartburn, no lower extremity edema, no nausea, no orthopnea, no palpitations, no shortness of breath, no syncope, no vomiting and no weakness    Risk factors: obesity and prior DVT/PE    Risk factors: no aortic disease, no  coronary artery disease, no diabetes mellitus, no Alex-Danlos syndrome, no high cholesterol, no hypertension, no immobilization, not male, not pregnant and no smoking        Review of Systems   Constitutional: Negative for diaphoresis and fever.   HENT: Negative for ear pain, nosebleeds and trouble swallowing.    Respiratory: Negative for cough and shortness of breath.    Cardiovascular: Positive for chest pain. Negative for palpitations, orthopnea, claudication and syncope.   Gastrointestinal: Negative for abdominal pain, anorexia, heartburn, nausea and vomiting.   Genitourinary: Negative for dysuria, frequency and urgency.   Musculoskeletal: Negative for back pain and neck pain.   Skin: Negative for pallor and rash.   Neurological: Negative for weakness and headaches.   Hematological: Negative for adenopathy.   Psychiatric/Behavioral: Negative.    All other systems reviewed and are negative.      Past Medical History:   Diagnosis Date   • Acute respiratory failure with hypoxia (CMS/HCC) 2018   • Chest pain 2018   • Chronic pain 2018   • Depression    • DVT (deep venous thrombosis) (CMS/[facility])     left arm,  андрей   • Kidney stone     around early , has had some prior to that as well, passed all of them without surgery   • Obesity 2018   • Peptic ulceration    • Pneumonia     never had to be hospitalized for it       Allergies   Allergen Reactions   • Coumadin [Warfarin Sodium] Anaphylaxis     Swelling of throat. LUE DVT in  when inpatient for unknown cause, did fine on heparin gtt, home on coumadin and anaphylaxis, completed and tolerated 6 months lovenox.   • Dilaudid [Hydromorphone Hcl] Other (See Comments)     Oxygen, blood pressure drops that takes several days to resolve, affects loc       Past Surgical History:   Procedure Laterality Date   • APPENDECTOMY      age 15, they removed appendix while doing oopherectomy   •  SECTION     • CHOLECYSTECTOMY N/A 2018     Procedure: CHOLECYSTECTOMY LAPAROSCOPIC;  Surgeon: Edmund BEE MD;  Location: [facility] OR;  Service: General   • ENDOSCOPY N/A 6/3/2018    Procedure: ESOPHAGOGASTRODUODENOSCOPY;  Surgeon: Mark I Brunner, MD;  Location: [facility] ENDOSCOPY;  Service: Gastroenterology   • GASTRECTOMY      [facility]   • RIGHT OOPHORECTOMY      torque   • TONSILLECTOMY      age 17       Family History   Problem Relation Age of Onset   • Atrial fibrillation Mother    • Heart failure Mother    • Diabetes Father    • Stroke Father    • No Known Problems Son    • Coronary artery disease Maternal Grandmother    • Valvular heart disease Maternal Grandmother    • Tuberculosis Maternal Grandfather    • Tuberculosis Paternal Grandfather    • Breast cancer Neg Hx    • Ovarian cancer Neg Hx    • Cancer Neg Hx        Social History     Social History   • Marital status:      Social History Main Topics   • Smoking status: Former Smoker     Packs/day: 0.25     Types: Cigarettes   • Smokeless tobacco: Never Used      Comment: About a third a pack a day, quit 2/2018   • Alcohol use Yes      Comment: about once a month 1-2 drinks at a dinner, no hx heavy use or abuse other than more social in college years   • Drug use: No   • Sexual activity: Defer     Other Topics Concern   • Not on file     Social History Narrative    Mrs. Hayes is a 48 year old white  female. She lives in [place]. They have two sons. 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 She does not have an advanced directive.           Objective   Physical Exam   Constitutional: She is oriented to person, place, and time. She appears well-developed and well-nourished.   Warm pink dry very anxious   HENT:   Head: Normocephalic and atraumatic.   Right Ear: External ear normal.   Left Ear: External ear normal.   Nose: Nose normal.   Mouth/Throat: Oropharynx is clear and moist.   Eyes: Conjunctivae are normal.  No scleral icterus.   Neck: Normal range of motion. No thyromegaly present.   Cardiovascular: Normal rate, regular rhythm and normal heart sounds.  Exam reveals no gallop and no friction rub.    No murmur heard.  Pulmonary/Chest: Effort normal and breath sounds normal. No respiratory distress. She has no wheezes. She has no rales. She exhibits no tenderness.   Abdominal: Soft. Bowel sounds are normal. She exhibits no distension and no mass. There is no tenderness. There is no rebound and no guarding.   Musculoskeletal: Normal range of motion. She exhibits no edema, tenderness or deformity.   Lymphadenopathy:     She has no cervical adenopathy.   Neurological: She is alert and oriented to person, place, and time. She has normal reflexes. She displays normal reflexes. No cranial nerve deficit. Coordination normal.   Skin: Skin is warm and dry. Capillary refill takes less than 2 seconds.   Psychiatric: She has a normal mood and affect. Her behavior is normal. Judgment and thought content normal.   Nursing note and vitals reviewed.      Procedures           ED Course  ED Course as of Aug 10 2228   Fri Jun 01, 2018   [facility]: 35.2 [ER]   2125 Discussion of the findings were reviewed with Dr. Mccarthy extensively.  Reviewed with the patient extensively.  Patient continues to have O2 sats that are in the 80s despite having a normal CT angiogram.  We do this to rule out dissection as well as pulmonary emboli.  There is no pneumonia mass or infection.  [MERVIN]   2220 Patient was discussed with Dr. Mccarthy again the patient's O2 sats of remained low.  Dr. Mccarthy is also seen and examined the patient spoke to Dr. Urmila Burch she's agreed to admit the patient.  [MERVIN]      ED Course User Index  [ER] Boom Mccarthy MD  [MERVIN] Gunnar Schwab PA        No results found for this or any previous visit (from the past 24 hour(s)).  Note: In addition to lab results from this visit, the labs listed above may include labs taken  at another facility or during a different encounter within the last 24 hours. Please correlate lab times with ED admission and discharge times for further clarification of the services performed during this visit.    CT Angiogram Chest With Contrast   Final Result   No evidence of active chest disease. In particular, no   evidence of pulmonary embolus or aortic dissection.       DICTATED:     06/01/2018   EDITED/ls :     06/01/2018        This report was finalized on 6/1/2018 11:44 PM by DR. Jony Her MD.          XR Chest 1 View   Final Result   No acute cardiopulmonary abnormality.       D:  06/01/2018   E:  06/01/2018       This report was finalized on 6/1/2018 5:07 PM by Josh Celaya.            Vitals:    06/04/18 0716 06/04/18 0718 06/04/18 0732 06/04/18 0826   BP:   (!) 79/43    BP Location:   Right arm    Patient Position:   Lying    Pulse: 57  52 75   Resp:   18    Temp:   96.7 °F (35.9 °C)    TempSrc:   Axillary    SpO2: (!) 88% (!) 87% 96% (!) 89%   Weight:       Height:         Medications   aspirin chewable tablet 324 mg (324 mg Oral Given 6/1/18 1616)   sodium chloride 0.9 % bolus 500 mL (0 mL Intravenous Stopped 6/1/18 1714)   HYDROmorphone (DILAUDID) injection 0.5 mg (0.5 mg Intravenous Given 6/1/18 1723)   ondansetron (ZOFRAN) injection 4 mg (4 mg Intravenous Given 6/1/18 1723)   fentaNYL citrate (PF) (SUBLIMAZE) injection 50 mcg (50 mcg Intravenous Given 6/1/18 1757)   iopamidol (ISOVUE-370) 76 % injection 100 mL (95 mL Intravenous Given 6/1/18 1834)   aluminum-magnesium hydroxide-simethicone (MAALOX MAX) 400-400-40 MG/5ML suspension 15 mL (15 mL Oral Given 6/1/18 2007)   lidocaine viscous (XYLOCAINE) 2 % mouth solution 15 mL (15 mL Mouth/Throat Given 6/1/18 2007)   dicyclomine (BENTYL) injection 20 mg (20 mg Intramuscular Given 6/1/18 2148)   pantoprazole (PROTONIX) EC tablet 40 mg (40 mg Oral Given 6/1/18 2151)   ipratropium-albuterol (DUO-NEB) nebulizer solution 3 mL (3 mL Nebulization Given  6/1/18 2140)   diazePAM (VALIUM) tablet 5 mg (5 mg Oral Given 6/1/18 2345)   sodium chloride 0.9 % bolus 1,000 mL (0 mL Intravenous Stopped 6/2/18 0200)   regadenoson (LEXISCAN) injection 0.4 mg (0.4 mg Intravenous Given 6/2/18 0838)   rubidium chloride (CARDIOGEN) injection 1 dose (1 dose Intravenous Given 6/2/18 0840)   rubidium chloride (CARDIOGEN) injection 1 dose (1 dose Intravenous Given 6/2/18 0830)     ECG/EMG Results (last 24 hours)     Procedure Component Value Units Date/Time    ECG 12 Lead [[id number]] Collected:  06/01/18 1529     Updated:  06/01/18 1946    Narrative:       Test Reason : chest pain  Blood Pressure : **/** mmHG  Vent. Rate : 065 BPM     Atrial Rate : 065 BPM     P-R Int : 136 ms          QRS Dur : 072 ms      QT Int : 428 ms       P-R-T Axes : 040 032 044 degrees     QTc Int : 445 ms    Normal sinus rhythm  When compared with ECG of 08-SEP-2016 21:15,  No significant change was found  Confirmed by LUIS MIGUEL MAHAN MD ([id number]) on 6/1/2018 7:46:09 PM    Referred By:  ESTEBAN           Confirmed By:LUIS MIGUEL MAHAN MD    ECG 12 Lead [[id number]] Collected:  06/01/18 1746     Updated:  06/01/18 1958    Narrative:       Test Reason : repeat  Blood Pressure : **/** mmHG  Vent. Rate : 057 BPM     Atrial Rate : 057 BPM     P-R Int : 148 ms          QRS Dur : 074 ms      QT Int : 470 ms       P-R-T Axes : 049 031 038 degrees     QTc Int : 457 ms    Sinus bradycardia  Otherwise normal ECG  When compared with ECG of 01-JUN-2018 15:29, (Unconfirmed)  No significant change was found  Confirmed by LUIS MIGUEL MAHAN MD ([id number]) on 6/1/2018 7:58:06 PM    Referred By:  ED MD           Confirmed By:LUIS MIGUEL MAHAN MD                  MDM  Number of Diagnoses or Management Options  Chest pain, unspecified type: new and requires workup  Hx of gastric bypass: new and requires workup  Hypoxia: new and requires workup  Intractable pain: new and requires workup     Amount and/or Complexity of Data Reviewed  Clinical lab tests:  reviewed and ordered  Tests in the radiology section of CPT®: reviewed and ordered  Tests in the medicine section of CPT®: reviewed and ordered  Decide to obtain previous medical records or to obtain history from someone other than the patient: yes  Discuss the patient with other providers: yes    Patient Progress  Patient progress: stable        Final diagnoses:   Intractable pain   Chest pain, unspecified type   Hypoxia   Hx of gastric bypass            LEROY Louie  06/03/18 0537       LEROY Louie  06/09/18 1427       Gunnar Schwab PA  08/10/18 5046

## 2018-06-02 NOTE — PLAN OF CARE
Problem: Cardiac: ACS (Acute Coronary Syndrome) (Adult)  Goal: Signs and Symptoms of Listed Potential Problems Will be Absent, Minimized or Managed (Cardiac: ACS)  Cards etiology ruled out today after echo & stress test, some pain but more in lower back.NSR, GI consulted.

## 2018-06-02 NOTE — PROGRESS NOTES
[facility] Medicine Services  PROGRESS NOTE    Patient Name: Haylee Hayes  : 1970  MRN: [id number]    Date of Admission: 2018  Length of Stay: 0  Primary Care Physician: Gunnar Lakhani MD    Subjective   Subjective     CC:  Chest pain    HPI:  Pt still with some dull, aching left sided/substernal CP, but not as severe as on admission.  Also with hypoxia and SOA with minimal exertion (ambulating to restroom) today.      Review of Systems  Gen- No fevers, chills  CV-  As above, denies palpitations  Resp- as above, no cough  GI- No N/V/D, abd pain    Otherwise ROS is negative except as mentioned in the HPI.    Objective   Objective     Vital Signs:   Temp:  [97.5 °F (36.4 °C)-98.6 °F (37 °C)] 97.5 °F (36.4 °C)  Heart Rate:  [20-81] 81  Resp:  [18-20] 18  BP: ()/(33-86) 84/35        Physical Exam:  Constitutional: No acute distress, awake, alert  HENT: NCAT, mucous membranes moist  Respiratory: Clear to auscultation bilaterally, respiratory effort normal   Cardiovascular: RRR, no murmurs, rubs, or gallops, palpable pedal pulses bilaterally  Gastrointestinal: Positive bowel sounds, soft, nontender, nondistended  Musculoskeletal: No bilateral ankle edema  Psychiatric: Appropriate affect, cooperative  Neurologic: Oriented x 3, strength symmetric in all extremities, Cranial Nerves grossly intact to confrontation, speech clear  Skin: No rashes    Results Reviewed:  I have personally reviewed current lab, radiology, and data and agree.      Results from last 7 days  Lab Units 18  1533   WBC 10*3/mm3 6.97   HEMOGLOBIN g/dL 15.4   HEMATOCRIT % 43.7   PLATELETS 10*3/mm3 229       Results from last 7 days  Lab Units 18  0600 18  0146 18  1533   SODIUM mmol/L  --   --  139   POTASSIUM mmol/L  --   --  4.4   CHLORIDE mmol/L  --   --  104   CO2 mmol/L  --   --  26.0   BUN mg/dL  --   --  20   CREATININE mg/dL  --   --  0.90   GLUCOSE mg/dL  --   --  96    CALCIUM mg/dL  --   --  9.4   ALT (SGPT) U/L  --   --  23   AST (SGOT) U/L  --   --  29   TROPONIN I ng/mL <0.006 <0.006  --      Estimated Creatinine Clearance: 99.6 mL/min (by C-G formula based on SCr of 0.9 mg/dL).  BNP   Date Value Ref Range Status   06/01/2018 9.0 0.0 - 100.0 pg/mL Final     Comment:     Results may be falsely decreased if patient taking Biotin.     No results found for: PHART    Microbiology Results Abnormal     None          Imaging Results (last 24 hours)     Procedure Component Value Units Date/Time    CT Angiogram Chest With Contrast [[id number]] Collected:  06/01/18 1858     Updated:  06/01/18 5136    Narrative:       EXAMINATION: CT ANGIOGRAM CHEST W/CONTRAST - 06/01/2018      INDICATION: PE and dissection.     TECHNIQUE: Spiral acquisition 3 mm post-IV contrast images through the  chest and upper abdomen with coronal 10 mm reconstructions. The  radiation dose reduction device was turned on for each scan per the  ALARA (As Low as Reasonably Achievable) protocol.     COMPARISON: None.     FINDINGS: There is good contrast opacification of the pulmonary arteries  and no evidence of filling defect to suggest pulmonary embolic disease.  There is no evidence of thoracic aortic aneurysm or dissection, no  pericardial or pleural effusion or evidence of mediastinal adenopathy.     Lung window images show no evidence of pneumonia,  edema or other active  disease.     Included images of the upper abdomen show no significant abnormalities  of the visualized portions of the liver, gallbladder, spleen, pancreas,  adrenal glands, or upper renal poles. The patient appears to have had  previous gastric bypass. No upper abdominal inflammatory changes are  seen.       Impression:       No evidence of active chest disease. In particular, no  evidence of pulmonary embolus or aortic dissection.     DICTATED:     06/01/2018  EDITED/ls :     06/01/2018      This report was finalized on 6/1/2018 11:44 PM by   Jony Her MD.       XR Chest 1 View [[id number]] Collected:  06/01/18 1626     Updated:  06/01/18 1709    Narrative:       EXAMINATION: XR CHEST 1 VW- 06/01/2018     INDICATION: Chest Pain triage protocol      COMPARISON: 09/08/2016     FINDINGS: Lungs are clear without focal opacity. No pleural effusion or  pneumothorax. Cardiomediastinal silhouette is within normal limits.           Impression:       No acute cardiopulmonary abnormality.     D:  06/01/2018  E:  06/01/2018     This report was finalized on 6/1/2018 5:07 PM by Josh Celaya.                I have reviewed the medications.    Assessment/Plan   Assessment / Plan     Hospital Problem List     * (Principal)Atypical chest pain    Overview Signed 6/2/2018 10:52 AM by Adonis Cornelius IV, MD     · [facility] admission for atypical chest pain, negative troponin/EKG.  · Cardiac PET (6/2/18): Normal perfusion. LVEF 70%. No evidence of coronary artery calcification on CT attenuation correction images.         Depression (Chronic)    Obesity (Chronic)    Chronic pain (Chronic)    Acute respiratory failure with hypoxia             Brief Hospital Course to date:  Haylee Hayes is a 48 y.o. female with PMH of depression, obesity with history of gastric bypass in 2010 and remote h/o tobacco abuse who presents with atypical chest pain.     Plan:  --stress test reportedly normal per Cards.  Doubt cardiac etiology of CP.  Likely GI in etiology. ? Esophageal spasm vs other? GI to evaluate today. NPO until their assessment  --acute hypoxic respiratory failure: CTA negative for PE.  Suspect underlying bronchospasm given h/o tobacco abuse and recent (x2) episodes of bronchitis in the last six months.  Will do PO steroids to see if this helps- of note, no significant wheezing on exam, so may not be related to bronchospasm.  Nevertheless, will trial steroids.  Wean supplemental O2 as tolerated- may need on d/c temporarily.      DVT Prophylaxis:  mechanical    CODE STATUS:  Full Code    Disposition: I expect the patient to be discharged 1 day to home if CP free and depending on GI plans.       Electronically signed by Shazia Purdy MD, 06/02/18, 11:18 AM.

## 2018-06-02 NOTE — CONSULTS
[facility] Consult    Referring Provider: Boom Mccarthy MD    PCP: Gunnar Lakhani MD    Reason for Consultation: Chest pain    Chief complaint: Chest pain    History of present illness:    Haylee Hayes is a 48 y.o. female who is admitted with chest pain, which began gradually yesterday. Pain severe brief sharp spasm quality in left chest, and associated with nausea and diaphoresis. The pain has since settled with less intensity in the mid retrosternal region and radiates through to the back. Patient takes voltaren and Aleve several times a week.    Allergies:  Coumadin [warfarin sodium]    Scheduled Meds:    buPROPion  mg Oral Daily   citalopram 40 mg Oral Daily   famotidine 20 mg Oral BID   gabapentin 600 mg Oral Q12H   pantoprazole 40 mg Oral Q AM   predniSONE 40 mg Oral Daily With Breakfast        Infusions:    sodium chloride 100 mL/hr Last Rate: 100 mL/hr (06/02/18 1216)       PRN Meds:  •  acetaminophen  •  diazePAM  •  docusate sodium  •  HYDROmorphone  •  lidocaine  •  methocarbamol  •  nitroglycerin  •  ondansetron **OR** ondansetron  •  promethazine  •  simethicone  •  sodium chloride  •  sodium chloride  •  temazepam    Home Meds:  Prescriptions Prior to Admission   Medication Sig Dispense Refill Last Dose   • buPROPion SR (WELLBUTRIN SR) 150 MG 12 hr tablet Take 300 mg by mouth Daily.   6/1/2018 at Unknown time   • citalopram (CeleXA) 40 MG tablet Take 40 mg by mouth daily.   6/1/2018 at Unknown time   • gabapentin (NEURONTIN) 600 MG tablet Take 600 mg by mouth 2 (Two) Times a Day.   6/1/2018 at Unknown time   • Methocarbamol (ROBAXIN PO) Take  by mouth As Needed.      • temazepam (RESTORIL) 15 MG capsule Take 15 mg by mouth As Needed for Sleep.   5/31/2018 at Unknown time       ROS: Review of Systems   Constitutional: Positive for diaphoresis. Negative for activity change, appetite change, chills, fatigue, fever and unexpected weight change.   HENT: Negative for mouth  sores, nosebleeds, sore throat and trouble swallowing.    Eyes: Negative.    Respiratory: Negative.  Negative for cough, choking, chest tightness, shortness of breath, wheezing and stridor.    Cardiovascular: Positive for chest pain. Negative for palpitations and leg swelling.   Gastrointestinal: Positive for nausea. Negative for abdominal distention, blood in stool, constipation, diarrhea and vomiting.   Endocrine: Negative.    Genitourinary: Negative.  Negative for difficulty urinating and dysuria.   Musculoskeletal: Positive for arthralgias and back pain. Negative for gait problem and joint swelling.   Skin: Negative.  Negative for color change, pallor and rash.   Allergic/Immunologic: Negative.    Neurological: Negative.  Negative for tremors, seizures, syncope, speech difficulty and light-headedness.   Hematological: Negative.  Negative for adenopathy. Does not bruise/bleed easily.   Psychiatric/Behavioral: Negative.  Negative for agitation and behavioral problems.       PAST MED HX:  Past Medical History:   Diagnosis Date   • Acute respiratory failure with hypoxia 2018   • Chest pain 2018   • Chronic pain 2018   • Depression    • DVT (deep venous thrombosis)     left arm,  андрей   • Kidney stone     around early , has had some prior to that as well, passed all of them without surgery   • Obesity 2018   • Pneumonia     never had to be hospitalized for it       PAST SURG HX:  Past Surgical History:   Procedure Laterality Date   • APPENDECTOMY      age 15, they removed appendix while doing oopherectomy   •  SECTION     • GASTRECTOMY      [facility]   • RIGHT OOPHORECTOMY      torque   • TONSILLECTOMY      age 17       FAM HX:  Family History   Problem Relation Age of Onset   • Atrial fibrillation Mother    • Heart failure Mother    • Diabetes Father    • Stroke Father    • No Known Problems Son    • Coronary artery disease Maternal Grandmother    • Valvular heart  "disease Maternal Grandmother    • Tuberculosis Maternal Grandfather    • Tuberculosis Paternal Grandfather    • Breast cancer Neg Hx    • Ovarian cancer Neg Hx    • Cancer Neg Hx        SOC HX:  Social History     Social History   • Marital status:      Spouse name: N/A   • Number of children: N/A   • Years of education: N/A     Occupational History   • Not on file.     Social History Main Topics   • Smoking status: Former Smoker     Packs/day: 0.25     Types: Cigarettes   • Smokeless tobacco: Never Used      Comment: About a third a pack a day, quit 2/2018   • Alcohol use Yes      Comment: about once a month 1-2 drinks at a dinner, no hx heavy use or abuse other than more social in college years   • Drug use: No   • Sexual activity: Defer     Other Topics Concern   • Not on file     Social History Narrative    Mrs. Hayes is a 48 year old white  female. She lives in [facility]. They have two sons. She works as a RN on a progressive floor at [employer], and is in school for her master's in management. She does not have an advanced directive.       PHYSICAL EXAM  BP (!) 84/35   Pulse 81   Temp 97.5 °F (36.4 °C) (Oral)   Resp 18   Ht 165.1 cm (65\")   Wt 123 kg (271 lb 2.7 oz)   SpO2 95%   BMI 45.12 kg/m²   Wt Readings from Last 3 Encounters:   06/02/18 123 kg (271 lb 2.7 oz)   09/08/16 104 kg (230 lb)   ,body mass index is 45.12 kg/m².  Physical Exam   Constitutional: She is oriented to person, place, and time. She appears well-developed and well-nourished.   HENT:   Head: Normocephalic.   Mouth/Throat: No oropharyngeal exudate.   Eyes: Conjunctivae are normal. No scleral icterus.   Neck: Normal range of motion.   Cardiovascular: Normal rate and regular rhythm.    No murmur heard.  Pulmonary/Chest: Effort normal and breath sounds normal. No respiratory distress. She has no wheezes. She has no rales.   Abdominal: Soft. Bowel sounds are normal. She exhibits no distension and no mass. There is no guarding. "   Obese. Tender in the epigastrium.   Genitourinary:   Genitourinary Comments: Deferred.   Musculoskeletal: Normal range of motion. She exhibits no edema.   Neurological: She is alert and oriented to person, place, and time.   Skin: Skin is warm and dry. Capillary refill takes less than 2 seconds. No rash noted.   Psychiatric: She has a normal mood and affect. Her behavior is normal.   Nursing note and vitals reviewed.    Results Review:   I reviewed the patient's new clinical results.    Lab Results   Component Value Date    WBC 6.97 06/01/2018    HGB 15.4 06/01/2018    HGB 14.0 09/08/2016    HCT 43.7 06/01/2018    MCV 83.6 06/01/2018     06/01/2018       No results found for: INR    Lab Results   Component Value Date    GLUCOSE 96 06/01/2018    BUN 20 06/01/2018    CREATININE 0.90 06/01/2018    EGFRIFNONA 67 06/01/2018    BCR 22.2 06/01/2018    CO2 26.0 06/01/2018    CALCIUM 9.4 06/01/2018    ALBUMIN 4.30 06/01/2018    ALKPHOS 102 (H) 06/01/2018    BILITOT 0.4 06/01/2018    ALT 23 06/01/2018    AST 29 06/01/2018       ASSESSMENTS/PLANS    Atypical chest pain, myofascial in character.  Epigastric tenderness.  Nausea    Patient is currently drinking sips and eating ice chips. She is at risk for marginal ulcer from NSAID use. Etiology of hypoxia is not known, but certainly not related to the GI tract. PE and ACS have been excluded.    >> EGD in AM.  >> Clear liquid diet.    I discussed the patients findings and my recommendations with patient and family    Mark I. Brunner, MD  06/02/18  12:48 PM

## 2018-06-02 NOTE — H&P
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 Onset yesterday. Gradual. Moderate-severe. Intermittent. Left chest wall on upper breast to slightly midline and slightly superior - some radiation into back. Spasm type quality. Associated with nausea and diaphoresis. Not improving. Denies any injury. She has a hx of ricky-en-y in  - lost 80 lbs but gained 30 back. She just started a ketogenic diet on Tuesday and lost 6 lbs intentionally since dropping the carbs. She reports hx of of D[facility] to [facility] in  when she was hospitalized for something unknown, and mother has a hx of cardiac arrest/a fib/CHF, father with multiple strokes. She was a light smoker of <1/3 PPD who quit 2018, and has a social beverage about once a month.    Her only colonoscopy was as a teenager for irritable bowel, and has never had an EGD. She has never had a stress test or heart cath.    Haylee Hayes is being admitted to [facility] for further evaluation.    Review of Systems   Constitutional: Positive for diaphoresis and fatigue. Negative for activity change, appetite change, chills, fever and unexpected weight change.   HENT: Positive for sore throat. Negative for ear pain, sinus pain, sinus pressure and trouble swallowing.         Mild sore throat last week with worsening seasonal allergies, resolved. Notes with the spasms on left chest, feels a sensation on her throat with them.   Respiratory: Negative for apnea, cough, shortness of breath and wheezing.         No hx hypoxia or O2 use.   Cardiovascular: Positive for chest pain. Negative for leg swelling.        Chronic occasional  palpitations, hx PVCs.   Gastrointestinal: Positive for diarrhea and nausea. Negative for abdominal pain, blood in stool, constipation and vomiting.        Hx constipation as teen. Occasional loose stool baseline since bariatric surgery in 2010.   Endocrine:        No hx DM or IGT.   Genitourinary: Negative for difficulty urinating, dysuria, flank pain, hematuria and vaginal bleeding.        Denies dark or odorous urine. Denies chance of pregnancy - no periods in 10 years while on mirena (last changed 5 years ago and due), no bleeding during this time.   Musculoskeletal: Positive for back pain.        Chronic back pain and leg neuropathy - on gabapentin (last increase in December), and rare robaxin PRN.   Skin: Negative for color change, pallor, rash and wound.   Allergic/Immunologic: Negative for immunocompromised state.        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   Neurological: Positive for dizziness, weakness and light-headedness. Negative for syncope.        Chronic neuropathy.   Psychiatric/Behavioral: Negative for confusion.        Not anxious at this time. Hx 2 lifetime panic attacks with last 2015. Just switched to working night shift recently - given Rx for restoril about a month ago she filled at [facility] (SHAN not showing), occasional use.        Otherwise 10-system ROS reviewed and is negative except as mentioned in the HPI.    Personal History     Past Medical History:   Diagnosis Date   • Acute respiratory failure with hypoxia 6/2/2018   • Chest pain 6/1/2018   • Chronic pain 6/1/2018   • Depression    • DVT (deep venous thrombosis)     left arm, 2005 андрей   • Kidney stone     around early 2000s, has had some prior to that as well, passed all of them without surgery   • Obesity 6/1/2018   • Pneumonia     never had to be hospitalized for it       Past Surgical History:   Procedure Laterality Date   • APPENDECTOMY      age 15, they removed appendix  while doing oopherectomy   •  SECTION     • GASTRECTOMY      [facility]   • RIGHT OOPHORECTOMY      torque   • TONSILLECTOMY      age 17       Family History: family history includes Atrial fibrillation in her mother; Coronary artery disease in her maternal grandmother; Diabetes in her father; Heart failure in her mother; No Known Problems in her son; Stroke in her father; Tuberculosis in her maternal grandfather and paternal grandfather; Valvular heart disease in her maternal grandmother.     Social History:  reports that she has quit smoking. Her smoking use included Cigarettes. She smoked 0.25 packs per day. She has never used smokeless tobacco. She reports that she drinks alcohol. She reports that she does not use drugs.  Social History     Social History Narrative    Mrs. Hayes is a 48 year old white  female. She lives in [place]. They have two sons. 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 She does not have an advanced directive.       Medications:    No current facility-administered medications on file prior to encounter.      Current Outpatient Prescriptions on File Prior to Encounter   Medication Sig Dispense Refill   • buPROPion SR (WELLBUTRIN SR) 150 MG 12 hr tablet Take 300 mg by mouth Daily.     • citalopram (CeleXA) 40 MG tablet Take 40 mg by mouth daily.     • [DISCONTINUED] cyclobenzaprine (FLEXERIL) 10 MG tablet Take 1 tablet by mouth 3 (three) times a day as needed for muscle spasms. 21 tablet 0   • [DISCONTINUED] diclofenac (VOLTAREN) 50 MG EC tablet Take 1 tablet by mouth 3 (three) times a day as needed (pain). 15 tablet 0   • [DISCONTINUED] traMADol (ULTRAM) 50 MG tablet Take 1 tablet by mouth every 4 (four) hours as needed for moderate pain (4-6). 20 tablet 0       Allergies   Allergen Reactions   • Coumadin [Warfarin Sodium] Anaphylaxis     Swelling of throat. LUE DVT in  when inpatient for unknown  cause, did fine on heparin gtt, home on coumadin and anaphylaxis, completed and tolerated 6 months lovenox.       Objective   Objective     Vital Signs:   Temp:  [98.5 °F (36.9 °C)] 98.5 °F (36.9 °C)  Heart Rate:  [51-72] 55  Resp:  [20] 20  BP: ()/(33-86) 113/64        Physical Exam   Constitutional: She is oriented to person, place, and time. She appears well-developed and well-nourished. No distress.   Room is unusually hot without air movement, pt about to move to new room. Unaccompanied.   HENT:   Head: Normocephalic and atraumatic.   Mouth/Throat: Oropharynx is clear and moist. No oropharyngeal exudate.   MM glistening, tongue slightly dry.   Eyes: Conjunctivae and EOM are normal. Pupils are equal, round, and reactive to light. Right eye exhibits no discharge. Left eye exhibits no discharge. No scleral icterus.   Neck: No JVD present. No tracheal deviation present.   Cardiovascular: Normal rate, regular rhythm, normal heart sounds and intact distal pulses.    No murmur heard.  No edema.   Pulmonary/Chest: Effort normal and breath sounds normal. No respiratory distress. She has no wheezes. She has no rales. She exhibits no tenderness.   Good air movement all lobes. RN has been unable to wean O2 - maintains and then drops into 70s/80s in multiple trials. No reproducible chest pain either.   Abdominal: Soft. She exhibits no distension. Bowel sounds are decreased. There is no tenderness. There is no rigidity and no guarding.   Musculoskeletal: She exhibits no edema or deformity.   Neurological: She is alert and oriented to person, place, and time. No cranial nerve deficit. Coordination normal.   Skin: Skin is warm. No rash noted. She is not diaphoretic. No erythema. No pallor.   Psychiatric: She has a normal mood and affect. Her behavior is normal. Judgment and thought content normal.   Calm, relaxed, cooperative, engages, pleasant.        Results Reviewed:  I have personally reviewed current lab, radiology,  and data and agree.    Lab Results (last 24 hours)     Procedure Component Value Units Date/Time    CBC & Differential [[id number]] Collected:  06/01/18 1533    Specimen:  Blood Updated:  06/01/18 1551    Narrative:       The following orders were created for panel order CBC & Differential.  Procedure                               Abnormality         Status                     ---------                               -----------         ------                     CBC Auto Differential[[id number]]         Abnormal            Final result                 Please view results for these tests on the individual orders.    Comprehensive Metabolic Panel [[id number]]  (Abnormal) Collected:  06/01/18 1533    Specimen:  Blood Updated:  06/01/18 1601     Glucose 96 mg/dL      BUN 20 mg/dL      Creatinine 0.90 mg/dL      Sodium 139 mmol/L      Potassium 4.4 mmol/L      Chloride 104 mmol/L      CO2 26.0 mmol/L      Calcium 9.4 mg/dL      Total Protein 7.5 g/dL      Albumin 4.30 g/dL      ALT (SGPT) 23 U/L      AST (SGOT) 29 U/L      Alkaline Phosphatase 102 (H) U/L      Total Bilirubin 0.4 mg/dL      eGFR Non African Amer 67 mL/min/1.73      Globulin 3.2 gm/dL      A/G Ratio 1.3 (L) g/dL      BUN/Creatinine Ratio 22.2     Anion Gap 9.0 mmol/L     Narrative:       National Kid[employer] Guidelines    Stage     Description        GFR  1         Normal or High     90+  2         Mild decrease      60-89  3         Moderate decrease  30-59  4         Severe decrease    15-29  5         Kidney failure     <15    Lipase [[id number]]  (Normal) Collected:  06/01/18 1533    Specimen:  Blood Updated:  06/01/18 1601     Lipase 29 U/L     BNP [[id number]]  (Normal) Collected:  06/01/18 1533    Specimen:  Blood Updated:  06/01/18 1608     BNP 9.0 pg/mL      Comment: Results may be falsely decreased if patient taking Biotin.       CBC Auto Differential [[id number]]  (Abnormal) Collected:  06/01/18 1533    Specimen:  Blood Updated:  06/01/18 1551      WBC 6.97 10*3/mm3      RBC 5.23 (H) 10*6/mm3      Hemoglobin 15.4 g/dL      Hematocrit 43.7 %      MCV 83.6 fL      MCH 29.4 pg      MCHC 35.2 g/dL      RDW 12.4 %      RDW-SD 37.5 fl      MPV 10.6 fL      Platelets 229 10*3/mm3      Neutrophil % 52.8 %      Lymphocyte % 38.2 %      Monocyte % 5.6 %      Eosinophil % 3.0 %      Basophil % 0.4 %      Immature Grans % 0.1 %      Neutrophils, Absolute 3.68 10*3/mm3      Lymphocytes, Absolute 2.66 10*3/mm3      Monocytes, Absolute 0.39 10*3/mm3      Eosinophils, Absolute 0.21 10*3/mm3      Basophils, Absolute 0.03 10*3/mm3      Immature Grans, Absolute 0.01 10*3/mm3     D-dimer, Quantitative [[id number]]  (Normal) Collected:  06/01/18 1533    Specimen:  Blood Updated:  06/01/18 1732     D-Dimer, Quantitative 0.26 mg/L (FEU)     Narrative:       Negative predictive value for exclusion of venous thromboembolism: < or = 0.5 mg/L (FEU)    POC Troponin, Rapid [[id number]]  (Normal) Collected:  06/01/18 1538    Specimen:  Blood Updated:  06/01/18 1555     Troponin I 0.00 ng/mL      Comment: Serial Number: [id number]:  [id number]       POCT pregnancy, urine [[id number]]  (Normal) Collected:  06/01/18 1643    Specimen:  Urine Updated:  06/01/18 1644     HCG, Urine, QL Negative     Lot Number [id number]     Internal Positive Control Positive     Internal Negative Control Negative    POC Troponin, Rapid [[id number]]  (Normal) Collected:  06/01/18 1752    Specimen:  Blood Updated:  06/01/18 1810     Troponin I 0.00 ng/mL      Comment: Serial Number: [id number]:  [id number]             Estimated Creatinine Clearance: 97.2 mL/min (by C-G formula based on SCr of 0.9 mg/dL).  Brief Urine Lab Results  (Last result in the past 365 days)      Color   Clarity   Blood   Leuk Est   Nitrite   Protein   CREAT   Urine HCG        06/01/18 1643               Negative         BNP   Date Value Ref Range Status   06/01/2018 9.0 0.0 - 100.0 pg/mL Final     Comment:     Results may be falsely decreased  if patient taking Biotin.     No results found for: PHART  Imaging Results (last 24 hours)     Procedure Component Value Units Date/Time    CT Angiogram Chest With Contrast [[id number]] Collected:  06/01/18 1858     Updated:  06/01/18 2346    Narrative:       EXAMINATION: CT ANGIOGRAM CHEST W/CONTRAST - 06/01/2018      INDICATION: PE and dissection.     TECHNIQUE: Spiral acquisition 3 mm post-IV contrast images through the  chest and upper abdomen with coronal 10 mm reconstructions. The  radiation dose reduction device was turned on for each scan per the  ALARA (As Low as Reasonably Achievable) protocol.     COMPARISON: None.     FINDINGS: There is good contrast opacification of the pulmonary arteries  and no evidence of filling defect to suggest pulmonary embolic disease.  There is no evidence of thoracic aortic aneurysm or dissection, no  pericardial or pleural effusion or evidence of mediastinal adenopathy.     Lung window images show no evidence of pneumonia,  edema or other active  disease.     Included images of the upper abdomen show no significant abnormalities  of the visualized portions of the liver, gallbladder, spleen, pancreas,  adrenal glands, or upper renal poles. The patient appears to have had  previous gastric bypass. No upper abdominal inflammatory changes are  seen.       Impression:       No evidence of active chest disease. In particular, no  evidence of pulmonary embolus or aortic dissection.     DICTATED:     06/01/2018  EDITED/ls :     06/01/2018      This report was finalized on 6/1/2018 11:44 PM by DR. Jony Her MD.       XR Chest 1 View [[id number]] Collected:  06/01/18 1626     Updated:  06/01/18 1709    Narrative:       EXAMINATION: XR CHEST 1 VW- 06/01/2018     INDICATION: Chest Pain triage protocol      COMPARISON: 09/08/2016     FINDINGS: Lungs are clear without focal opacity. No pleural effusion or  pneumothorax. Cardiomediastinal silhouette is within normal limits.            Impression:       No acute cardiopulmonary abnormality.     D:  06/01/2018  E:  06/01/2018     This report was finalized on 6/1/2018 5:07 PM by Josh Celaya.                Assessment/Plan   Assessment / Plan     Hospital Problem List     * (Principal)Chest pain    Depression (Chronic)    Obesity (Chronic)    Chronic pain (Chronic)    Acute respiratory failure with hypoxia            Assessment & Plan:    1. Atypical chest pain  - Clears as tolerated and NPO after midnight. Consult GI in AM for possible EGD. IV fluids. IV anti-emetics. IV/PO meds for pain and GI. Trial valium for spasms, defer CCB as best SBP is 100.  - Note recent change of ketogenic diet since Tuesday.  - Trend troponin and EKG. Negative d dimer, negative CTA chest. Tele. Neg lipase. S/o right oopherectomy and appy, and ricky-en-y, still has gallbladder.  - CTA chest does not reveal abnormalities to what of abdomen is able to be visualized, defer abd/pelvis at this time.  - Given aspirin load in ED, re-eval daily need in AM once further workup complete. Has received fentanyl, dilaudid, maalox+viscous lidocaine, zofran, protonix, saline bolus without relief.  - Stress test and ECHO in AM given concurrent hypoxia and family history of heart problems.  - Electrolytes.  - No injury/MSK.  - Rare social ETOH. Not on NSAIDs.  - Not anxious on exam.    2. Acute respiratory failure with hypoxia:  - Possibly atelectasis due to decreased inspiratory effort with chest pain.  - Respiratory panel PCR, echocardiogram  - No known underlying lung disease but hx lighter tobacco.  - Continuous pulse ox.    3. Otherwise continue chronic POC.    DVT prophylaxis: Teds/scuds, if not discharged may start heparin SQ after possible endoscopy.    CODE STATUS:  Full Code    Admission Status:  I believe this patient meets INPATIENT status due to the need for care which can only be reasonably provided in an hospital setting such as aggressive/expedited ancillary services and/or  "consultation services, the necessity for IV medications, close physician monitoring and/or the possible need for procedures.  In such, I feel patient’s risk for adverse outcomes and need for care warrant INPATIENT evaluation and predict the patient’s care encounter to likely last beyond 2 midnights.      Electronically signed by ASIA Conti, 06/01/18, 10:46 PM.      Brief Attending Admission Attestation     I have seen and examined the patient, performing an independent face-to-face diagnostic evaluation with plan of care reviewed and developed with the advanced practice clinician (APC).      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  In the ED, she was noted to be hypoxic on room air.  She had negative D-dimer and a normal CTA chest.  Troponins negative x 3 and EKG without ischemic changes.  She does have a family history of cardiac arrest in her mother.  She did not have any improvement in her chest pain with GI cocktail.  She notes that she has recently started a ketogenic diet and has had some muscle spasms in her legs since starting.  She has some discomfort with deep palpation under left breast but this does not reproduce the pain she describes.  Due to persistent pain and hypoxia, hospitalist service asked to admit for further workup.      Attending Physical Exam:  Constitutional: No acute distress, awake, alert, laying in bed, intermittently uncomfortable due to pain  Eyes: PERRLA, sclerae anicteric, no conjunctival injection  HENT: NCAT, mucous membranes moist  Neck: Supple, trachea midline  Respiratory: Clear to auscultation bilaterally, nonlabored respirations   Cardiovascular: RRR, no murmurs, rubs, or gallops  Gastrointestinal: Positive bowel sounds, soft, nontender, " nondistended  Musculoskeletal: No bilateral ankle edema, no clubbing or cyanosis to extremities, mild pain with deep palpation of chest wall beneath left breast  Psychiatric: Appropriate affect, cooperative  Neurologic: Oriented x 3, Cranial Nerves grossly intact to confrontation, speech clear  Skin: No rashes      Brief Assessment/Plan :  See above for further detailed assessment and plan developed with APC which I have reviewed and/or edited.      Electronically signed by Glenda Burch MD, 06/02/18, 2:40 AM.

## 2018-06-03 ENCOUNTER — ANESTHESIA (OUTPATIENT)
Dept: GASTROENTEROLOGY | Facility: HOSPITAL | Age: 48
End: 2018-06-03

## 2018-06-03 ENCOUNTER — ANESTHESIA EVENT (OUTPATIENT)
Dept: GASTROENTEROLOGY | Facility: HOSPITAL | Age: 48
End: 2018-06-03

## 2018-06-03 LAB
ANION GAP SERPL CALCULATED.3IONS-SCNC: 7 MMOL/L (ref 3–11)
BUN BLD-MCNC: 10 MG/DL (ref 9–23)
BUN/CREAT SERPL: 12.5 (ref 7–25)
CALCIUM SPEC-SCNC: 8.7 MG/DL (ref 8.7–10.4)
CHLORIDE SERPL-SCNC: 106 MMOL/L (ref 99–109)
CO2 SERPL-SCNC: 26 MMOL/L (ref 20–31)
CREAT BLD-MCNC: 0.8 MG/DL (ref 0.6–1.3)
DEPRECATED RDW RBC AUTO: 37.1 FL (ref 37–54)
ERYTHROCYTE [DISTWIDTH] IN BLOOD BY AUTOMATED COUNT: 12.1 % (ref 11.3–14.5)
GFR SERPL CREATININE-BSD FRML MDRD: 76 ML/MIN/1.73
GLUCOSE BLD-MCNC: 84 MG/DL (ref 70–100)
HCT VFR BLD AUTO: 40.9 % (ref 34.5–44)
HGB BLD-MCNC: 14.2 G/DL (ref 11.5–15.5)
MCH RBC QN AUTO: 29.2 PG (ref 27–31)
MCHC RBC AUTO-ENTMCNC: 34.7 G/DL (ref 32–36)
MCV RBC AUTO: 84.2 FL (ref 80–99)
PLATELET # BLD AUTO: 209 10*3/MM3 (ref 150–450)
PMV BLD AUTO: 10.3 FL (ref 6–12)
POTASSIUM BLD-SCNC: 4.1 MMOL/L (ref 3.5–5.5)
RBC # BLD AUTO: 4.86 10*6/MM3 (ref 3.89–5.14)
SODIUM BLD-SCNC: 139 MMOL/L (ref 132–146)
WBC NRBC COR # BLD: 8.27 10*3/MM3 (ref 3.5–10.8)

## 2018-06-03 PROCEDURE — 96376 TX/PRO/DX INJ SAME DRUG ADON: CPT

## 2018-06-03 PROCEDURE — G0378 HOSPITAL OBSERVATION PER HR: HCPCS

## 2018-06-03 PROCEDURE — 96361 HYDRATE IV INFUSION ADD-ON: CPT

## 2018-06-03 PROCEDURE — 99226 PR SBSQ OBSERVATION CARE/DAY 35 MINUTES: CPT | Performed by: INTERNAL MEDICINE

## 2018-06-03 PROCEDURE — 25010000002 PROPOFOL 10 MG/ML EMULSION: Performed by: ANESTHESIOLOGY

## 2018-06-03 PROCEDURE — 80048 BASIC METABOLIC PNL TOTAL CA: CPT | Performed by: INTERNAL MEDICINE

## 2018-06-03 PROCEDURE — 88305 TISSUE EXAM BY PATHOLOGIST: CPT | Performed by: INTERNAL MEDICINE

## 2018-06-03 PROCEDURE — 25010000002 HYDROMORPHONE PER 4 MG: Performed by: NURSE PRACTITIONER

## 2018-06-03 PROCEDURE — 25010000002 ONDANSETRON PER 1 MG: Performed by: NURSE PRACTITIONER

## 2018-06-03 PROCEDURE — 63710000001 PREDNISONE PER 1 MG: Performed by: INTERNAL MEDICINE

## 2018-06-03 PROCEDURE — 85027 COMPLETE CBC AUTOMATED: CPT | Performed by: INTERNAL MEDICINE

## 2018-06-03 RX ORDER — FAMOTIDINE 20 MG/1
20 TABLET, FILM COATED ORAL ONCE
Status: DISCONTINUED | OUTPATIENT
Start: 2018-06-03 | End: 2018-06-03 | Stop reason: HOSPADM

## 2018-06-03 RX ORDER — HYDROMORPHONE HCL 110MG/55ML
0.5 PATIENT CONTROLLED ANALGESIA SYRINGE INTRAVENOUS
Status: DISCONTINUED | OUTPATIENT
Start: 2018-06-03 | End: 2018-06-03 | Stop reason: HOSPADM

## 2018-06-03 RX ORDER — SODIUM CHLORIDE, SODIUM LACTATE, POTASSIUM CHLORIDE, CALCIUM CHLORIDE 600; 310; 30; 20 MG/100ML; MG/100ML; MG/100ML; MG/100ML
9 INJECTION, SOLUTION INTRAVENOUS CONTINUOUS
Status: DISCONTINUED | OUTPATIENT
Start: 2018-06-03 | End: 2018-06-04 | Stop reason: HOSPADM

## 2018-06-03 RX ORDER — PROPOFOL 10 MG/ML
VIAL (ML) INTRAVENOUS AS NEEDED
Status: DISCONTINUED | OUTPATIENT
Start: 2018-06-03 | End: 2018-06-03 | Stop reason: SURG

## 2018-06-03 RX ORDER — HYDROCODONE BITARTRATE AND ACETAMINOPHEN 5; 325 MG/1; MG/1
1 TABLET ORAL EVERY 6 HOURS PRN
Status: DISCONTINUED | OUTPATIENT
Start: 2018-06-03 | End: 2018-06-03

## 2018-06-03 RX ORDER — SODIUM CHLORIDE 0.9 % (FLUSH) 0.9 %
1-10 SYRINGE (ML) INJECTION AS NEEDED
Status: DISCONTINUED | OUTPATIENT
Start: 2018-06-03 | End: 2018-06-03 | Stop reason: HOSPADM

## 2018-06-03 RX ORDER — FENTANYL CITRATE 50 UG/ML
50 INJECTION, SOLUTION INTRAMUSCULAR; INTRAVENOUS
Status: DISCONTINUED | OUTPATIENT
Start: 2018-06-03 | End: 2018-06-03 | Stop reason: HOSPADM

## 2018-06-03 RX ORDER — LIDOCAINE HYDROCHLORIDE 10 MG/ML
0.5 INJECTION, SOLUTION EPIDURAL; INFILTRATION; INTRACAUDAL; PERINEURAL ONCE AS NEEDED
Status: DISCONTINUED | OUTPATIENT
Start: 2018-06-03 | End: 2018-06-03 | Stop reason: HOSPADM

## 2018-06-03 RX ORDER — HYDROCODONE BITARTRATE AND ACETAMINOPHEN 5; 325 MG/1; MG/1
2 TABLET ORAL EVERY 4 HOURS PRN
Status: DISCONTINUED | OUTPATIENT
Start: 2018-06-03 | End: 2018-06-04 | Stop reason: HOSPADM

## 2018-06-03 RX ADMIN — SODIUM CHLORIDE 100 ML/HR: 9 INJECTION, SOLUTION INTRAVENOUS at 13:32

## 2018-06-03 RX ADMIN — PREDNISONE 40 MG: 20 TABLET ORAL at 09:03

## 2018-06-03 RX ADMIN — METHOCARBAMOL 500 MG: 500 TABLET ORAL at 13:36

## 2018-06-03 RX ADMIN — PANTOPRAZOLE SODIUM 40 MG: 40 TABLET, DELAYED RELEASE ORAL at 06:45

## 2018-06-03 RX ADMIN — PROPOFOL 50 MG: 10 INJECTION, EMULSION INTRAVENOUS at 08:24

## 2018-06-03 RX ADMIN — ONDANSETRON 4 MG: 2 INJECTION INTRAMUSCULAR; INTRAVENOUS at 22:28

## 2018-06-03 RX ADMIN — HYDROMORPHONE HYDROCHLORIDE 0.5 MG: 1 INJECTION, SOLUTION INTRAMUSCULAR; INTRAVENOUS; SUBCUTANEOUS at 09:54

## 2018-06-03 RX ADMIN — FAMOTIDINE 20 MG: 20 TABLET ORAL at 09:04

## 2018-06-03 RX ADMIN — PROPOFOL 50 MG: 10 INJECTION, EMULSION INTRAVENOUS at 08:21

## 2018-06-03 RX ADMIN — DIAZEPAM 5 MG: 5 TABLET ORAL at 22:27

## 2018-06-03 RX ADMIN — SODIUM CHLORIDE 100 ML/HR: 9 INJECTION, SOLUTION INTRAVENOUS at 00:30

## 2018-06-03 RX ADMIN — BUPROPION HYDROCHLORIDE 300 MG: 150 TABLET, EXTENDED RELEASE ORAL at 09:03

## 2018-06-03 RX ADMIN — GABAPENTIN 600 MG: 300 CAPSULE ORAL at 22:27

## 2018-06-03 RX ADMIN — PROPOFOL 50 MG: 10 INJECTION, EMULSION INTRAVENOUS at 08:19

## 2018-06-03 RX ADMIN — HYDROCODONE BITARTRATE AND ACETAMINOPHEN 2 TABLET: 5; 325 TABLET ORAL at 22:27

## 2018-06-03 RX ADMIN — FAMOTIDINE 20 MG: 20 TABLET ORAL at 22:27

## 2018-06-03 RX ADMIN — HYDROMORPHONE HYDROCHLORIDE 0.5 MG: 1 INJECTION, SOLUTION INTRAMUSCULAR; INTRAVENOUS; SUBCUTANEOUS at 06:44

## 2018-06-03 RX ADMIN — SODIUM CHLORIDE 100 ML/HR: 9 INJECTION, SOLUTION INTRAVENOUS at 22:44

## 2018-06-03 RX ADMIN — ACETAMINOPHEN 650 MG: 325 TABLET ORAL at 09:54

## 2018-06-03 RX ADMIN — METHOCARBAMOL 500 MG: 500 TABLET ORAL at 01:45

## 2018-06-03 RX ADMIN — ONDANSETRON 4 MG: 2 INJECTION INTRAMUSCULAR; INTRAVENOUS at 06:48

## 2018-06-03 RX ADMIN — HYDROMORPHONE HYDROCHLORIDE 0.5 MG: 1 INJECTION, SOLUTION INTRAMUSCULAR; INTRAVENOUS; SUBCUTANEOUS at 15:28

## 2018-06-03 RX ADMIN — METHOCARBAMOL 500 MG: 500 TABLET ORAL at 22:27

## 2018-06-03 RX ADMIN — HYDROMORPHONE HYDROCHLORIDE 0.5 MG: 1 INJECTION, SOLUTION INTRAMUSCULAR; INTRAVENOUS; SUBCUTANEOUS at 01:45

## 2018-06-03 RX ADMIN — ACETAMINOPHEN 650 MG: 325 TABLET ORAL at 04:01

## 2018-06-03 RX ADMIN — CITALOPRAM HYDROBROMIDE 40 MG: 40 TABLET ORAL at 09:03

## 2018-06-03 RX ADMIN — HYDROCODONE BITARTRATE AND ACETAMINOPHEN 1 TABLET: 5; 325 TABLET ORAL at 13:30

## 2018-06-03 RX ADMIN — HYDROCODONE BITARTRATE AND ACETAMINOPHEN 2 TABLET: 5; 325 TABLET ORAL at 17:53

## 2018-06-03 RX ADMIN — SIMETHICONE 80 MG: 80 TABLET, CHEWABLE ORAL at 01:45

## 2018-06-03 RX ADMIN — TEMAZEPAM 15 MG: 15 CAPSULE ORAL at 22:27

## 2018-06-03 RX ADMIN — SIMETHICONE 80 MG: 80 TABLET, CHEWABLE ORAL at 23:59

## 2018-06-03 RX ADMIN — LIDOCAINE 1 PATCH: 50 PATCH CUTANEOUS at 22:26

## 2018-06-03 RX ADMIN — GABAPENTIN 600 MG: 300 CAPSULE ORAL at 09:03

## 2018-06-03 NOTE — ANESTHESIA PREPROCEDURE EVALUATION
Anesthesia Evaluation                  Airway   Mallampati: I  TM distance: >3 FB  Neck ROM: full  no difficulty expected  Dental - normal exam     Pulmonary - normal exam   (+) pneumonia ,   Cardiovascular - normal exam    (+) DVT,       Neuro/Psych  (+) psychiatric history Depression,     GI/Hepatic/Renal/Endo    (+) morbid obesity,      Musculoskeletal     Abdominal  - normal exam    Bowel sounds: normal.   Substance History      OB/GYN          Other                        Anesthesia Plan    ASA 3     general   (Propofol)  intravenous induction   Anesthetic plan and risks discussed with patient.

## 2018-06-03 NOTE — PLAN OF CARE
Problem: Patient Care Overview  Goal: Plan of Care Review  Outcome: Ongoing (interventions implemented as appropriate)   06/03/18 9080   Coping/Psychosocial   Plan of Care Reviewed With patient   OTHER   Outcome Summary VSS. NSR on monitor. pain meds given 3 times today. pt.had EGD today. may be discharge home tomorrow.       Problem: Cardiac: ACS (Acute Coronary Syndrome) (Adult)  Goal: Signs and Symptoms of Listed Potential Problems Will be Absent, Minimized or Managed (Cardiac: ACS)  Outcome: Outcome(s) achieved Date Met: 06/03/18

## 2018-06-03 NOTE — ANESTHESIA POSTPROCEDURE EVALUATION
Patient: Haylee Hayes    Procedure Summary     Date:  06/03/18 Room / Location:   [facility]    Anesthesia Start:  0813 Anesthesia Stop:  0834    Procedure:  ESOPHAGOGASTRODUODENOSCOPY (N/A Esophagus) Diagnosis:       Nausea      Atypical chest pain      Epigastric abdominal tenderness with rebound tenderness      (Nausea [R11.0])      (Atypical chest pain [R07.89])      (Epigastric abdominal tenderness with rebound tenderness [R10.826])    Surgeon:  Mark I Brunner, MD Provider:  Darnell Price MD    Anesthesia Type:  general ASA Status:  3          Anesthesia Type: general  Last vitals  BP   (!) 82/43 (06/03/18 0830)   Temp   97.8 °F (36.6 °C) (06/03/18 0353)   Pulse   58 (06/03/18 0830)   Resp   16 (06/03/18 0830)     SpO2   95 % (06/03/18 0830)     Post Anesthesia Care and Evaluation    Patient location during evaluation: PACU  Patient participation: complete - patient participated  Level of consciousness: awake and alert  Pain score: 0  Pain management: adequate  Airway patency: patent  Anesthetic complications: No anesthetic complications  PONV Status: none  Cardiovascular status: hemodynamically stable and acceptable  Respiratory status: nonlabored ventilation, acceptable and nasal cannula  Hydration status: acceptable

## 2018-06-03 NOTE — BRIEF OP NOTE
ESOPHAGOGASTRODUODENOSCOPY  Progress Note    Haylee Ortizter  6/1/2018 - 6/3/2018    EGD shows 6 mm clean-based ulcer at pouch-Hanny limb anastomosis. Esophagus is normal. Nothing seen to explain patient's symptoms.    >> Await biopsies    Mark I. Brunner, MD     Date: 6/3/2018  Time: 8:29 AM

## 2018-06-03 NOTE — PLAN OF CARE
Problem: Patient Care Overview  Goal: Plan of Care Review  Outcome: Ongoing (interventions implemented as appropriate)   06/03/18 0622   Coping/Psychosocial   Plan of Care Reviewed With patient   Plan of Care Review   Progress no change   OTHER   Outcome Summary Pt did not rest well through the night. Pt required multiple doses of PRN pain medicine for pain in chest and back pain medicine made the pain tolerable until next dose due. VSS. Pt still requiring 2 L NC. Pt has been NPO since midnght for EGD today. Will continue to monitor.

## 2018-06-03 NOTE — PROGRESS NOTES
[facility] Medicine Services  PROGRESS NOTE    Patient Name: Haylee Hayes  : 1970  MRN: [id number]    Date of Admission: 2018  Length of Stay: 0  Primary Care Physician: Gunnar Lakhain MD    Subjective   Subjective     CC:  Chest pain    HPI:  Pt still requiring frequent IV dilaudid overnight.  Doesn't last long enough nor is it enough to completely alleviate the pain. Went for EGD this am.   Review of Systems  Gen- No fevers, chills  CV-  As above, denies palpitations  Resp- as above, no cough  GI- No N/V/D, abd pain    Otherwise ROS is negative except as mentioned in the HPI.    Objective   Objective     Vital Signs:   Temp:  [97.8 °F (36.6 °C)-98.5 °F (36.9 °C)] 97.8 °F (36.6 °C)  Heart Rate:  [51-71] 51  Resp:  [14-18] 16  BP: ()/(43-59) 88/48        Physical Exam:  Constitutional: No acute distress, awake, alert  HENT: NCAT, mucous membranes moist  Respiratory: Clear to auscultation bilaterally, respiratory effort normal   Cardiovascular: RRR, no murmurs, rubs, or gallops, palpable pedal pulses bilaterally  Gastrointestinal: Positive bowel sounds, soft, nontender, nondistended  Musculoskeletal: No bilateral ankle edema  Psychiatric: Appropriate affect, cooperative  Neurologic: Oriented x 3, strength symmetric in all extremities, Cranial Nerves grossly intact to confrontation, speech clear  Skin: No rashes    Results Reviewed:  I have personally reviewed current lab, radiology, and data and agree.      Results from last 7 days  Lab Units 18  0629 18  1533   WBC 10*3/mm3 8.27 6.97   HEMOGLOBIN g/dL 14.2 15.4   HEMATOCRIT % 40.9 43.7   PLATELETS 10*3/mm3 209 229       Results from last 7 days  Lab Units 18  0629 18  1202 18  0600 18  0146 18  1533   SODIUM mmol/L 139  --   --   --  139   POTASSIUM mmol/L 4.1  --   --   --  4.4   CHLORIDE mmol/L 106  --   --   --  104   CO2 mmol/L 26.0  --   --   --  26.0   BUN mg/dL 10   --   --   --  20   CREATININE mg/dL 0.80  --   --   --  0.90   GLUCOSE mg/dL 84  --   --   --  96   CALCIUM mg/dL 8.7  --   --   --  9.4   ALT (SGPT) U/L  --   --   --   --  23   AST (SGOT) U/L  --   --   --   --  29   TROPONIN I ng/mL  --  <0.006 <0.006 <0.006  --      Estimated Creatinine Clearance: 112 mL/min (by C-G formula based on SCr of 0.8 mg/dL).  BNP   Date Value Ref Range Status   06/01/2018 9.0 0.0 - 100.0 pg/mL Final     Comment:     Results may be falsely decreased if patient taking Biotin.     No results found for: PHART    Microbiology Results Abnormal     None          Imaging Results (last 24 hours)     ** No results found for the last 24 hours. **        Results for orders placed during the hospital encounter of 06/01/18   Adult Transthoracic Echo Complete W/ Cont if Necessary Per Protocol    Narrative · Left ventricular systolic function is normal. Estimated EF = 66%.  · Mild tricuspid valve regurgitation is present.  · There is no evidence of pericardial effusion.  · No evidence of pulmonary hypertension is present.  · Elongated mitral valve chordae are present.  · Normal right ventricular cavity size, wall thickness, systolic function   and septal motion noted.  · Left ventricular diastolic function is normal.          I have reviewed the medications.    Assessment/Plan   Assessment / Plan     Hospital Problem List     * (Principal)Atypical chest pain    Overview Signed 6/2/2018 10:52 AM by Adonis Cornelius IV, MD     · [facility] admission for atypical chest pain, negative troponin/EKG.  · Cardiac PET (6/2/18): Normal perfusion. LVEF 70%. No evidence of coronary artery calcification on CT attenuation correction images.         Depression (Chronic)    Obesity (Chronic)    Chronic pain (Chronic)    Acute respiratory failure with hypoxia    Nausea    Overview Signed 6/2/2018  7:54 PM by Mark I Brunner, MD     Added automatically from request for surgery [id number]         Epigastric abdominal  tenderness with rebound tenderness    Overview Signed 6/2/2018  7:54 PM by Mark I Brunner, MD     Added automatically from request for surgery [id number]                  Brief Hospital Course to date:  Haylee Hayes is a 48 y.o. female with PMH of depression, obesity with history of gastric bypass in 2010 and remote h/o tobacco abuse who presents with atypical chest pain.     Plan:  --stress test reportedly normal per Cards.  Doubt cardiac etiology of CP.  Likely GI in etiology. ? Esophageal spasm. Valium PRN added. EGD this am showed ulcer.  Plan as per GI.   --acute hypoxic respiratory failure: CTA negative for PE.  Suspect underlying bronchospasm given h/o tobacco abuse and recent (x2) episodes of bronchitis in the last six months.  Will do PO steroids to see if this helps- of note, no significant wheezing on exam, so may not be related to bronchospasm.  Nevertheless, will trial steroids.  Wean supplemental O2 as tolerated- may need on d/c temporarily. Suspect needs outpatient sleep study as desats mainly when sleeping.       DVT Prophylaxis:  mechanical    CODE STATUS: Full Code    Disposition: I expect the patient to be discharged 1 day to home if CP free      Electronically signed by Shazia Purdy MD, 06/03/18, 1:12 PM.

## 2018-06-04 VITALS
SYSTOLIC BLOOD PRESSURE: 79 MMHG | BODY MASS INDEX: 45.15 KG/M2 | HEIGHT: 65 IN | TEMPERATURE: 96.7 F | RESPIRATION RATE: 18 BRPM | HEART RATE: 75 BPM | DIASTOLIC BLOOD PRESSURE: 43 MMHG | WEIGHT: 271 LBS | OXYGEN SATURATION: 89 %

## 2018-06-04 LAB
ANION GAP SERPL CALCULATED.3IONS-SCNC: 6 MMOL/L (ref 3–11)
BUN BLD-MCNC: 11 MG/DL (ref 9–23)
BUN/CREAT SERPL: 13.8 (ref 7–25)
CALCIUM SPEC-SCNC: 8.4 MG/DL (ref 8.7–10.4)
CHLORIDE SERPL-SCNC: 110 MMOL/L (ref 99–109)
CO2 SERPL-SCNC: 25 MMOL/L (ref 20–31)
CREAT BLD-MCNC: 0.8 MG/DL (ref 0.6–1.3)
DEPRECATED RDW RBC AUTO: 38.5 FL (ref 37–54)
ERYTHROCYTE [DISTWIDTH] IN BLOOD BY AUTOMATED COUNT: 12.6 % (ref 11.3–14.5)
GFR SERPL CREATININE-BSD FRML MDRD: 76 ML/MIN/1.73
GLUCOSE BLD-MCNC: 92 MG/DL (ref 70–100)
HCT VFR BLD AUTO: 37.8 % (ref 34.5–44)
HGB BLD-MCNC: 12.9 G/DL (ref 11.5–15.5)
MCH RBC QN AUTO: 29.1 PG (ref 27–31)
MCHC RBC AUTO-ENTMCNC: 34.1 G/DL (ref 32–36)
MCV RBC AUTO: 85.1 FL (ref 80–99)
PLATELET # BLD AUTO: 189 10*3/MM3 (ref 150–450)
PMV BLD AUTO: 10.9 FL (ref 6–12)
POTASSIUM BLD-SCNC: 3.8 MMOL/L (ref 3.5–5.5)
RBC # BLD AUTO: 4.44 10*6/MM3 (ref 3.89–5.14)
SODIUM BLD-SCNC: 141 MMOL/L (ref 132–146)
WBC NRBC COR # BLD: 8.55 10*3/MM3 (ref 3.5–10.8)

## 2018-06-04 PROCEDURE — G0378 HOSPITAL OBSERVATION PER HR: HCPCS

## 2018-06-04 PROCEDURE — 85027 COMPLETE CBC AUTOMATED: CPT | Performed by: INTERNAL MEDICINE

## 2018-06-04 PROCEDURE — 63710000001 PREDNISONE PER 1 MG: Performed by: INTERNAL MEDICINE

## 2018-06-04 PROCEDURE — 80048 BASIC METABOLIC PNL TOTAL CA: CPT | Performed by: INTERNAL MEDICINE

## 2018-06-04 PROCEDURE — 25010000002 HYDROMORPHONE PER 4 MG: Performed by: NURSE PRACTITIONER

## 2018-06-04 PROCEDURE — 99217 PR OBSERVATION CARE DISCHARGE MANAGEMENT: CPT | Performed by: INTERNAL MEDICINE

## 2018-06-04 RX ORDER — PREDNISONE 20 MG/1
40 TABLET ORAL
Qty: 2 TABLET | Refills: 0 | Status: SHIPPED | OUTPATIENT
Start: 2018-06-05 | End: 2018-06-06

## 2018-06-04 RX ORDER — HYDROCODONE BITARTRATE AND ACETAMINOPHEN 5; 325 MG/1; MG/1
2 TABLET ORAL EVERY 6 HOURS PRN
Qty: 36 TABLET | Refills: 0 | Status: SHIPPED | OUTPATIENT
Start: 2018-06-04 | End: 2018-06-20 | Stop reason: SDUPTHER

## 2018-06-04 RX ORDER — PANTOPRAZOLE SODIUM 40 MG/1
40 TABLET, DELAYED RELEASE ORAL DAILY
Qty: 30 TABLET | Refills: 0 | Status: SHIPPED | OUTPATIENT
Start: 2018-06-04 | End: 2018-06-07 | Stop reason: SDUPTHER

## 2018-06-04 RX ADMIN — METHOCARBAMOL 500 MG: 500 TABLET ORAL at 04:13

## 2018-06-04 RX ADMIN — HYDROMORPHONE HYDROCHLORIDE 0.5 MG: 1 INJECTION, SOLUTION INTRAMUSCULAR; INTRAVENOUS; SUBCUTANEOUS at 01:45

## 2018-06-04 RX ADMIN — PANTOPRAZOLE SODIUM 40 MG: 40 TABLET, DELAYED RELEASE ORAL at 06:16

## 2018-06-04 RX ADMIN — BUPROPION HYDROCHLORIDE 300 MG: 150 TABLET, EXTENDED RELEASE ORAL at 09:25

## 2018-06-04 RX ADMIN — HYDROCODONE BITARTRATE AND ACETAMINOPHEN 2 TABLET: 5; 325 TABLET ORAL at 09:32

## 2018-06-04 RX ADMIN — HYDROCODONE BITARTRATE AND ACETAMINOPHEN 2 TABLET: 5; 325 TABLET ORAL at 04:13

## 2018-06-04 RX ADMIN — FAMOTIDINE 20 MG: 20 TABLET ORAL at 09:26

## 2018-06-04 RX ADMIN — GABAPENTIN 600 MG: 300 CAPSULE ORAL at 09:25

## 2018-06-04 RX ADMIN — CITALOPRAM HYDROBROMIDE 40 MG: 40 TABLET ORAL at 09:26

## 2018-06-04 RX ADMIN — DIAZEPAM 5 MG: 5 TABLET ORAL at 06:16

## 2018-06-04 RX ADMIN — PREDNISONE 40 MG: 20 TABLET ORAL at 09:26

## 2018-06-04 NOTE — TELEPHONE ENCOUNTER
JEOVANY on patient's cell phone.  Looks like she has been discharged and has appointment in office on 06/11.  Wanted to check in with her and see how she is doing.

## 2018-06-04 NOTE — PROGRESS NOTES
Discharge Planning Assessment  [facility]     Patient Name: Haylee Hayes  MRN: [id number]  Today's Date: 6/4/2018    Admit Date: 6/1/2018          Discharge Needs Assessment     Row Name 06/04/18 1041       Living Environment    Lives With child(harper), adult;spouse    Current Living Arrangements home/apartment/condo    Primary Care Provided by self    Provides Primary Care For no one    Family Caregiver if Needed child(harper), adult;spouse    Quality of Family Relationships involved;supportive;helpful    Able to Return to Prior Arrangements yes       Resource/Environmental Concerns    Resource/Environmental Concerns none    Transportation Concerns car, none       Transition Planning    Patient/Family Anticipates Transition to home with family    Patient/Family Anticipated Services at Transition durable medical equipment    Transportation Anticipated family or friend will provide       Discharge Needs Assessment    Concerns to be Addressed denies needs/concerns at this time    Equipment Currently Used at Home none    Anticipated Changes Related to Illness none    Equipment Needed After Discharge oxygen            Discharge Plan     Row Name 06/04/18 1042       Plan    Plan Home with family    Patient/Family in Agreement with Plan yes    Plan Comments Met with pt at . She is independent of ADL's and lives with her  and 2 adult children in [facility]. Denies DME but CM received call from MD that pt will need O2 at  and outpatient sleep study. Pt is agreeable with no preference to DME company. Called and efaxed referral to Reena at [facility]. She will call pt to discuss payment because O2 will not be covered by insurance at this time. Her copay will be $75 per month. Called and spoke to Radha at [facility] outpatient sleep center and she has received referral for consult. Per Radha, someone from the outpatient sleep study office will call pt with her appt time for her consultation due to a sleep study cannot  be arranged until a consult has been completed. Pt has no further needs at this time. CM will cont to follow.     Final Discharge Disposition Code 01 - home or self-care        Destination     No service coordination in this encounter.      Durable Medical Equipment     Service Request Status Selected Specialties Address Phone Number Fax Number    [facility] Pending - Request Sent N/A [address], [place] [id number] [phone]        Catherine Franco 6/4/2018 1034    From : Catherine Franco RN,    [phone]                 Dialysis/Infusion     No service coordination in this encounter.      Home Medical Care     No service coordination in this encounter.      Social Care     No service coordination in this encounter.        Expected Discharge Date and Time     Expected Discharge Date Expected Discharge Time    Jun 4, 2018               Demographic Summary     Row Name 06/04/18 1037       General Information    Referral Source admission list    Preferred Language English       Contact Information    Permission Granted to Share Info With             Functional Status     Row Name 06/04/18 1037       Functional Status    Usual Activity Tolerance good    Current Activity Tolerance good       Functional Status, IADL    Medications independent    Meal Preparation independent    Housekeeping independent    Laundry independent    Shopping independent       Employment/    Employment/ Comments Has [employer] PPO with no concerns            Psychosocial    No documentation.           Abuse/Neglect    No documentation.           Legal    No documentation.           Substance Abuse    No documentation.           Patient Forms    No documentation.         Catherine Franco

## 2018-06-04 NOTE — PLAN OF CARE
Problem: Patient Care Overview  Goal: Plan of Care Review  Outcome: Outcome(s) achieved Date Met: 06/04/18 06/04/18 1028   OTHER   Outcome Summary pt. being discharge home.     Goal: Individualization and Mutuality  Outcome: Outcome(s) achieved Date Met: 06/04/18    Goal: Discharge Needs Assessment  Outcome: Outcome(s) achieved Date Met: 06/04/18    Goal: Interprofessional Rounds/Family Conf  Outcome: Outcome(s) achieved Date Met: 06/04/18

## 2018-06-04 NOTE — DISCHARGE SUMMARY
"    [facility] Medicine Services  DISCHARGE SUMMARY    Patient Name: Haylee Hayes  : 1970  MRN: [id number]    Date of Admission: 2018  Date of Discharge:  2018  Primary Care Physician: Jennyfer Mauricio PA-C    Consults     Date and Time Order Name Status Description    2018 0036 Inpatient Gastroenterology Consult Completed         Hospital Course     Presenting Problem:   Chest pain [R07.9]    Active Hospital Problems (** Indicates Principal Problem)    Diagnosis Date Noted   • **Atypical chest pain [R07.89] 2018     Priority: High   • Acute respiratory failure with hypoxia [J96.01] 2018     Priority: Medium   • Obesity [E66.9] 2018     Priority: Medium   • Depression [F32.9] 2018     Priority: Low   • Chronic pain [G89.29] 2018     Priority: Low   • Epigastric abdominal tenderness with rebound tenderness [R10.826] 2018     Priority: Low      Resolved Hospital Problems    Diagnosis Date Noted Date Resolved   No resolved problems to display.          Hospital Course:  Haylee Hayes is a 48 y.o. female with PMH of depression, obesity with history of gastric bypass in  and remote h/o tobacco abuse who presents with atypical chest pain. Pt underwent stress test which showed no evidence of ischemia.  Suspect her chest pain was possibly GI in etiology. GI was consulted, pt underwent EGD which revealed a small, clean based ulcer at the anastomotic site- pathology results are PENDING, however, GI felt that this was likely not the etiology of her pain.  Pt still complains of pain intermittently but Lortab has seemed to help (of note, she also had Dilaudid which helped but she was hesitant to take this as she did not want to \"get hooked\").  Pt has been eating well and is anxious to go home today.  If okay with GI, I will d/c her with plans for outpatient follow up with her PCP in a week. She will go home with PO PPI once daily to " be taken on an empty stomach. Pt also was provided with short course of pain medication with the understanding that this is a temporizing measure and should only be used PRN.      Of note, pt was hypoxic on admission.  CTA was negative for PE. She did report two episodes in the recent past of bronchitis and that she had not really improved from each episode.  I suspect this is likely due to bronchospasm, so I started her on PO steroids- this seemed to help with daytime hypoxia/ dyspnea on exertion as these have resolved.  She does have noted nocturnal hypoxia which has persisted- she has a previous h/o CECILLE prior to gastric bypass.  I will arrange for outpatient supplemental O2 at night and she will need a sleep study in the near future so that she may obtain a CPAP.     Procedure(s):  ESOPHAGOGASTRODUODENOSCOPY   06/03 0815 UPPER GI ENDOSCOPY   Day of Discharge     HPI:   Doing well this am, still with some episodes of pain but better with PO pain medications.     Review of Systems  Gen- No fevers, chills  CV- No chest pain, palpitations  Resp- No cough, dyspnea  GI- No N/V/D, abd pain    Otherwise ROS is negative except as mentioned in the HPI.    Vital Signs:   Temp:  [96.7 °F (35.9 °C)-98 °F (36.7 °C)] 96.7 °F (35.9 °C)  Heart Rate:  [52-60] 52  Resp:  [16-22] 18  BP: ()/(43-90) 79/43     Physical Exam:  Constitutional: No acute distress, awake, alert  HENT: NCAT, mucous membranes moist  Respiratory: Clear to auscultation bilaterally, respiratory effort normal   Cardiovascular: RRR, no murmurs, rubs, or gallops, palpable pedal pulses bilaterally  Gastrointestinal: Positive bowel sounds, soft, nontender, nondistended  Musculoskeletal: No bilateral ankle edema  Psychiatric: Appropriate affect, cooperative  Neurologic: Oriented x 3, strength symmetric in all extremities, Cranial Nerves grossly intact to confrontation, speech clear  Skin: No rashes       Pertinent  and/or Most Recent Results       Results from  last 7 days  Lab Units 06/04/18  0616 06/03/18  0629 06/01/18  1533   WBC 10*3/mm3 8.55 8.27 6.97   HEMOGLOBIN g/dL 12.9 14.2 15.4   HEMATOCRIT % 37.8 40.9 43.7   PLATELETS 10*3/mm3 189 209 229   SODIUM mmol/L 141 139 139   POTASSIUM mmol/L 3.8 4.1 4.4   CHLORIDE mmol/L 110* 106 104   CO2 mmol/L 25.0 26.0 26.0   BUN mg/dL 11 10 20   CREATININE mg/dL 0.80 0.80 0.90   GLUCOSE mg/dL 92 84 96   CALCIUM mg/dL 8.4* 8.7 9.4       Results from last 7 days  Lab Units 06/01/18  1533   BILIRUBIN mg/dL 0.4   ALK PHOS U/L 102*   ALT (SGPT) U/L 23   AST (SGOT) U/L 29           Invalid input(s): TG, LDLCALC, LDLREALC    Results from last 7 days  Lab Units 06/02/18  1202 06/02/18  0600 06/02/18  0146 06/01/18  1533   TSH mIU/mL  --  4.763  --   --    HEMOGLOBIN A1C %  --  6.00*  --   --    BNP pg/mL  --   --   --  9.0   TROPONIN I ng/mL <0.006 <0.006 <0.006  --      Brief Urine Lab Results  (Last result in the past 365 days)      Color   Clarity   Blood   Leuk Est   Nitrite   Protein   CREAT   Urine HCG        06/02/18 0426 Yellow Clear Negative Negative Negative Negative               Microbiology Results Abnormal     None          Imaging Results (all)     Procedure Component Value Units Date/Time    CT Angiogram Chest With Contrast [[id number]] Collected:  06/01/18 1858     Updated:  06/01/18 2346    Narrative:       EXAMINATION: CT ANGIOGRAM CHEST W/CONTRAST - 06/01/2018      INDICATION: PE and dissection.     TECHNIQUE: Spiral acquisition 3 mm post-IV contrast images through the  chest and upper abdomen with coronal 10 mm reconstructions. The  radiation dose reduction device was turned on for each scan per the  ALARA (As Low as Reasonably Achievable) protocol.     COMPARISON: None.     FINDINGS: There is good contrast opacification of the pulmonary arteries  and no evidence of filling defect to suggest pulmonary embolic disease.  There is no evidence of thoracic aortic aneurysm or dissection, no  pericardial or pleural effusion  or evidence of mediastinal adenopathy.     Lung window images show no evidence of pneumonia,  edema or other active  disease.     Included images of the upper abdomen show no significant abnormalities  of the visualized portions of the liver, gallbladder, spleen, pancreas,  adrenal glands, or upper renal poles. The patient appears to have had  previous gastric bypass. No upper abdominal inflammatory changes are  seen.       Impression:       No evidence of active chest disease. In particular, no  evidence of pulmonary embolus or aortic dissection.     DICTATED:     06/01/2018  EDITED/ls :     06/01/2018      This report was finalized on 6/1/2018 11:44 PM by DR. Jony Her MD.       XR Chest 1 View [[id number]] Collected:  06/01/18 1626     Updated:  06/01/18 1709    Narrative:       EXAMINATION: XR CHEST 1 VW- 06/01/2018     INDICATION: Chest Pain triage protocol      COMPARISON: 09/08/2016     FINDINGS: Lungs are clear without focal opacity. No pleural effusion or  pneumothorax. Cardiomediastinal silhouette is within normal limits.           Impression:       No acute cardiopulmonary abnormality.     D:  06/01/2018  E:  06/01/2018     This report was finalized on 6/1/2018 5:07 PM by Josh Celaya.                       Results for orders placed during the hospital encounter of 06/01/18   Adult Transthoracic Echo Complete W/ Cont if Necessary Per Protocol    Narrative · Left ventricular systolic function is normal. Estimated EF = 66%.  · Mild tricuspid valve regurgitation is present.  · There is no evidence of pericardial effusion.  · No evidence of pulmonary hypertension is present.  · Elongated mitral valve chordae are present.  · Normal right ventricular cavity size, wall thickness, systolic function   and septal motion noted.  · Left ventricular diastolic function is normal.           Order Current Status    Resp Virus Profile (RVP) PCR - Swab, Nasopharynx In process    Tissue Pathology Exam In process         Discharge Details      Haylee Hayes   Home Medication Instructions MIRYAM:[id number]    Printed on:06/04/18 1007   Medication Information                      buPROPion SR (WELLBUTRIN SR) 150 MG 12 hr tablet  Take 300 mg by mouth Daily.             citalopram (CeleXA) 40 MG tablet  Take 40 mg by mouth daily.             gabapentin (NEURONTIN) 600 MG tablet  Take 600 mg by mouth 2 (Two) Times a Day.             HYDROcodone-acetaminophen (NORCO) 5-325 MG per tablet  Take 2 tablets by mouth Every 6 (Six) Hours As Needed for Moderate Pain .             Methocarbamol (ROBAXIN PO)  Take  by mouth As Needed.             pantoprazole (PROTONIX) 40 MG EC tablet  Take 1 tablet by mouth Daily.             predniSONE (DELTASONE) 20 MG tablet  Take 2 tablets by mouth Daily With Breakfast.             temazepam (RESTORIL) 15 MG capsule  Take 15 mg by mouth As Needed for Sleep.                   Discharge Disposition:  Home or Self Care    Discharge Diet:  Diet Instructions     Diet: Consistent Carbohydrate       Discharge Diet:  Consistent Carbohydrate          Discharge Activity:   Activity Instructions     Activity as Tolerated             Special Instructions:      No future appointments.    Additional Instructions for the Follow-ups that You Need to Schedule     Discharge Follow-up with PCP    As directed      Follow Up Details:  in one week or next available with Dr. Mauricio               Time Spent on Discharge:  35 minutes    Electronically signed by Shazia Purdy MD, 06/04/18, 10:07 AM.

## 2018-06-04 NOTE — PLAN OF CARE
Problem: Patient Care Overview  Goal: Plan of Care Review  Outcome: Ongoing (interventions implemented as appropriate)   06/04/18 050   Coping/Psychosocial   Plan of Care Reviewed With patient   Plan of Care Review   Progress no change   OTHER   Outcome Summary Pt still having c/o CP spasm; only given dilaudid x1 dose this shift; pt took PO pain medicine x2 this shift; as well as other PRN medicine for pain relief. Weaned to 1.5L NC while asleep; RA while awake. VSS. Will continue to monitor.

## 2018-06-04 NOTE — PROGRESS NOTES
Case Management Discharge Note    Final Note: Pt spoke with Reena at [facility] and discussed private pay pricing. Pt states she will decline oxygen at night at this time since she has a consult with the sleep study scheduled for this Wednesday. Pt denies any further d/c needs.     Destination     No service coordination in this encounter.      Durable Medical Equipment     Service Request Status Selected Specialties Address Phone Number Fax Number    [facility] - Request Sent N/A [address] [place] [address] [phone]        Catherine Franco 6/4/2018 1034    From : Catherine Franco RN,    [phone]                 Dialysis/Infusion     No service coordination in this encounter.      Home Medical Care     No service coordination in this encounter.      Social Care     No service coordination in this encounter.             Final Discharge Disposition Code: 01 - home or self-care

## 2018-06-04 NOTE — DISCHARGE PLACEMENT REQUEST
"Angelique Hayes (48 y.o. Female)     Date of Birth Social Security Number Address Home Phone MRN    1970  [address] [facility] [address]  [id number]    [employer]oration[employer] Marital Status          Adventism        Admission Date Admission Type Admitting Provider Attending Provider Department, Room/Bed    18 Emergency Shazia Purdy MD Howard, Gabriela Kirk, MD [facility] 6A, N612/1    Discharge Date Discharge Disposition Discharge Destination         Home or Self Care              Attending Provider:  Shazia Purdy MD    Allergies:  Coumadin [Warfarin Sodium]    Isolation:  None   Infection:  None   Code Status:  FULL    Ht:  165.1 cm (65\")   Wt:  123 kg (271 lb)    Admission Cmt:  None   Principal Problem:  Atypical chest pain [R07.89] More...                 Active Insurance as of 2018     Primary Coverage     Payor Plan Insurance Group Employer/Plan Group    ANTHEM BLUE CROSS ANTHEM BLUE CROSS BLUE SHIELD PPO [id number]     Payor Plan Address Payor Plan Phone Number Effective From Effective To    [address] [phone] 2016     [facility] [id number]       Subscriber Name Subscriber Birth Date Member ID       ANGELIQUE HAYES 1970 [id number]                 Emergency Contacts      (Rel.) Home Phone Work Phone Mobile Phone    Gunnar Pineda (Spouse) -- -- [phone]               History & Physical      Glenda Burch MD at 2018 10:46 PM              [facility] Medicine Services  HISTORY AND PHYSICAL    Patient Name: Angelique Hayes  : 1970  MRN: [id number]  Primary Care Physician: Gunnar Lakhani MD       Subjective   Subjective     Chief Complaint:  Chest pain    HPI:  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 Onset yesterday. Gradual. Moderate-severe. Intermittent. Left chest wall on " upper breast to slightly midline and slightly superior - some radiation into back. Spasm type quality. Associated with nausea and diaphoresis. Not improving. Denies any injury. She has a hx of ricky-en-y in 2010 - lost 80 lbs but gained 30 back. She just started a ketogenic diet on Tuesday and lost 6 lbs intentionally since dropping the carbs. She reports hx of of DVT to [facility] in 2005 when she was hospitalized for something unknown, and mother has a hx of cardiac arrest/a fib/CHF, father with multiple strokes. She was a light smoker of <1/3 PPD who quit 2/2018, and has a social beverage about once a month.    Her only colonoscopy was as a teenager for irritable bowel, and has never had an EGD. She has never had a stress test or heart cath.    Haylee Hayes is being admitted to [facility] for further evaluation.    Review of Systems   Constitutional: Positive for diaphoresis and fatigue. Negative for activity change, appetite change, chills, fever and unexpected weight change.   HENT: Positive for sore throat. Negative for ear pain, sinus pain, sinus pressure and trouble swallowing.         Mild sore throat last week with worsening seasonal allergies, resolved. Notes with the spasms on left chest, feels a sensation on her throat with them.   Respiratory: Negative for apnea, cough, shortness of breath and wheezing.         No hx hypoxia or O2 use.   Cardiovascular: Positive for chest pain. Negative for leg swelling.        Chronic occasional palpitations, hx PVCs.   Gastrointestinal: Positive for diarrhea and nausea. Negative for abdominal pain, blood in stool, constipation and vomiting.        Hx constipation as teen. Occasional loose stool baseline since bariatric surgery in 2010.   Endocrine:        No hx DM or IGT.   Genitourinary: Negative for difficulty urinating, dysuria, flank pain, hematuria and vaginal bleeding.        Denies dark or odorous urine. Denies chance of pregnancy - no periods in 10 years while on mirena  (last changed 5 years ago and due), no bleeding during this time.   Musculoskeletal: Positive for back pain.        Chronic back pain and leg neuropathy - on gabapentin (last increase in December), and rare robaxin PRN.   Skin: Negative for color change, pallor, rash and wound.   Allergic/Immunologic: Negative for immunocompromised state.        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   Neurological: Positive for dizziness, weakness and light-headedness. Negative for syncope.        Chronic neuropathy.   Psychiatric/Behavioral: Negative for confusion.        Not anxious at this time. Hx 2 lifetime panic attacks with last . Just switched to working night shift recently - given Rx for restoril about a month ago she filled at [facility] (SHAN not showing), occasional use.        Otherwise 10-system ROS reviewed and is negative except as mentioned in the HPI.    Personal History     Past Medical History:   Diagnosis Date   • Acute respiratory failure with hypoxia 2018   • Chest pain 2018   • Chronic pain 2018   • Depression    • DVT (deep venous thrombosis)     left arm,  андрей   • Kidney stone     around early , has had some prior to that as well, passed all of them without surgery   • Obesity 2018   • Pneumonia     never had to be hospitalized for it       Past Surgical History:   Procedure Laterality Date   • APPENDECTOMY      age 15, they removed appendix while doing oopherectomy   •  SECTION     • GASTRECTOMY      [facility]   • RIGHT OOPHORECTOMY      torque   • TONSILLECTOMY      age 17       Family History: family history includes Atrial fibrillation in her mother; Coronary artery disease in her maternal grandmother; Diabetes in her father; Heart failure in her mother; No Known Problems in her son; Stroke in her father; Tuberculosis in her maternal grandfather and paternal grandfather; Valvular heart disease in her  maternal grandmother.     Social History:  reports that she has quit smoking. Her smoking use included Cigarettes. She smoked 0.25 packs per day. She has never used smokeless tobacco. She reports that she drinks alcohol. She reports that she does not use drugs.  Social History     Social History Narrative    Mrs. Hayes is a 48 year old white  female. She lives in [place]. They have two sons. 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 She does not have an advanced directive.       Medications:    No current facility-administered medications on file prior to encounter.      Current Outpatient Prescriptions on File Prior to Encounter   Medication Sig Dispense Refill   • buPROPion SR (WELLBUTRIN SR) 150 MG 12 hr tablet Take 300 mg by mouth Daily.     • citalopram (CeleXA) 40 MG tablet Take 40 mg by mouth daily.     • [DISCONTINUED] cyclobenzaprine (FLEXERIL) 10 MG tablet Take 1 tablet by mouth 3 (three) times a day as needed for muscle spasms. 21 tablet 0   • [DISCONTINUED] diclofenac (VOLTAREN) 50 MG EC tablet Take 1 tablet by mouth 3 (three) times a day as needed (pain). 15 tablet 0   • [DISCONTINUED] traMADol (ULTRAM) 50 MG tablet Take 1 tablet by mouth every 4 (four) hours as needed for moderate pain (4-6). 20 tablet 0       Allergies   Allergen Reactions   • Coumadin [Warfarin Sodium] Anaphylaxis     Swelling of throat. LUE DVT in 2005 when inpatient for unknown cause, did fine on heparin gtt, home on coumadin and anaphylaxis, completed and tolerated 6 months lovenox.       Objective   Objective     Vital Signs:   Temp:  [98.5 °F (36.9 °C)] 98.5 °F (36.9 °C)  Heart Rate:  [51-72] 55  Resp:  [20] 20  BP: ()/(33-86) 113/64        Physical Exam   Constitutional: She is oriented to person, place, and time. She appears well-developed and well-nourished. No distress.   Room is unusually hot without air movement, pt about to move to new room.  Unaccompanied.   HENT:   Head: Normocephalic and atraumatic.   Mouth/Throat: Oropharynx is clear and moist. No oropharyngeal exudate.   MM glistening, tongue slightly dry.   Eyes: Conjunctivae and EOM are normal. Pupils are equal, round, and reactive to light. Right eye exhibits no discharge. Left eye exhibits no discharge. No scleral icterus.   Neck: No JVD present. No tracheal deviation present.   Cardiovascular: Normal rate, regular rhythm, normal heart sounds and intact distal pulses.    No murmur heard.  No edema.   Pulmonary/Chest: Effort normal and breath sounds normal. No respiratory distress. She has no wheezes. She has no rales. She exhibits no tenderness.   Good air movement all lobes. RN has been unable to wean O2 - maintains and then drops into 70s/80s in multiple trials. No reproducible chest pain either.   Abdominal: Soft. She exhibits no distension. Bowel sounds are decreased. There is no tenderness. There is no rigidity and no guarding.   Musculoskeletal: She exhibits no edema or deformity.   Neurological: She is alert and oriented to person, place, and time. No cranial nerve deficit. Coordination normal.   Skin: Skin is warm. No rash noted. She is not diaphoretic. No erythema. No pallor.   Psychiatric: She has a normal mood and affect. Her behavior is normal. Judgment and thought content normal.   Calm, relaxed, cooperative, engages, pleasant.        Results Reviewed:  I have personally reviewed current lab, radiology, and data and agree.    Lab Results (last 24 hours)     Procedure Component Value Units Date/Time    CBC & Differential [[id number]] Collected:  06/01/18 1533    Specimen:  Blood Updated:  06/01/18 1551    Narrative:       The following orders were created for panel order CBC & Differential.  Procedure                               Abnormality         Status                     ---------                               -----------         ------                     CBC Auto  Differential[[id number]]         Abnormal            Final result                 Please view results for these tests on the individual orders.    Comprehensive Metabolic Panel [[id number]]  (Abnormal) Collected:  06/01/18 1533    Specimen:  Blood Updated:  06/01/18 1601     Glucose 96 mg/dL      BUN 20 mg/dL      Creatinine 0.90 mg/dL      Sodium 139 mmol/L      Potassium 4.4 mmol/L      Chloride 104 mmol/L      CO2 26.0 mmol/L      Calcium 9.4 mg/dL      Total Protein 7.5 g/dL      Albumin 4.30 g/dL      ALT (SGPT) 23 U/L      AST (SGOT) 29 U/L      Alkaline Phosphatase 102 (H) U/L      Total Bilirubin 0.4 mg/dL      eGFR Non African Amer 67 mL/min/1.73      Globulin 3.2 gm/dL      A/G Ratio 1.3 (L) g/dL      BUN/Creatinine Ratio 22.2     Anion Gap 9.0 mmol/L     Narrative:       National Kidney Foundation Guidelines    Stage     Description        GFR  1         Normal or High     90+  2         Mild decrease      60-89  3         Moderate decrease  30-59  4         Severe decrease    15-29  5         Kidney failure     <15    Lipase [[id number]]  (Normal) Collected:  06/01/18 1533    Specimen:  Blood Updated:  06/01/18 1601     Lipase 29 U/L     BNP [[id number]]  (Normal) Collected:  06/01/18 1533    Specimen:  Blood Updated:  06/01/18 1608     BNP 9.0 pg/mL      Comment: Results may be falsely decreased if patient taking Biotin.       CBC Auto Differential [[id number]]  (Abnormal) Collected:  06/01/18 1533    Specimen:  Blood Updated:  06/01/18 1551     WBC 6.97 10*3/mm3      RBC 5.23 (H) 10*6/mm3      Hemoglobin 15.4 g/dL      Hematocrit 43.7 %      MCV 83.6 fL      MCH 29.4 pg      MCHC 35.2 g/dL      RDW 12.4 %      RDW-SD 37.5 fl      MPV 10.6 fL      Platelets 229 10*3/mm3      Neutrophil % 52.8 %      Lymphocyte % 38.2 %      Monocyte % 5.6 %      Eosinophil % 3.0 %      Basophil % 0.4 %      Immature Grans % 0.1 %      Neutrophils, Absolute 3.68 10*3/mm3      Lymphocytes, Absolute 2.66 10*3/mm3      Monocytes,  Absolute 0.39 10*3/mm3      Eosinophils, Absolute 0.21 10*3/mm3      Basophils, Absolute 0.03 10*3/mm3      Immature Grans, Absolute 0.01 10*3/mm3     D-dimer, Quantitative [[id number]]  (Normal) Collected:  06/01/18 1533    Specimen:  Blood Updated:  06/01/18 1732     D-Dimer, Quantitative 0.26 mg/L (FEU)     Narrative:       Negative predictive value for exclusion of venous thromboembolism: < or = 0.5 mg/L (FEU)    POC Troponin, Rapid [[id number]]  (Normal) Collected:  06/01/18 1538    Specimen:  Blood Updated:  06/01/18 1555     Troponin I 0.00 ng/mL      Comment: Serial Number: [id number]:  [id number]       POCT pregnancy, urine [[id number]]  (Normal) Collected:  06/01/18 1643    Specimen:  Urine Updated:  06/01/18 1644     HCG, Urine, QL Negative     Lot Number [id number]     Internal Positive Control Positive     Internal Negative Control Negative    POC Troponin, Rapid [[id number]]  (Normal) Collected:  06/01/18 1752    Specimen:  Blood Updated:  06/01/18 1810     Troponin I 0.00 ng/mL      Comment: Serial Number: [id number]:  [id number]             Estimated Creatinine Clearance: 97.2 mL/min (by C-G formula based on SCr of 0.9 mg/dL).  Brief Urine Lab Results  (Last result in the past 365 days)      Color   Clarity   Blood   Leuk Est   Nitrite   Protein   CREAT   Urine HCG        06/01/18 1643               Negative         BNP   Date Value Ref Range Status   06/01/2018 9.0 0.0 - 100.0 pg/mL Final     Comment:     Results may be falsely decreased if patient taking Biotin.     No results found for: PHART  Imaging Results (last 24 hours)     Procedure Component Value Units Date/Time    CT Angiogram Chest With Contrast [[id number]] Collected:  06/01/18 1858     Updated:  06/01/18 2346    Narrative:       EXAMINATION: CT ANGIOGRAM CHEST W/CONTRAST - 06/01/2018      INDICATION: PE and dissection.     TECHNIQUE: Spiral acquisition 3 mm post-IV contrast images through the  chest and upper abdomen with coronal 10 mm  reconstructions. The  radiation dose reduction device was turned on for each scan per the  ALARA (As Low as Reasonably Achievable) protocol.     COMPARISON: None.     FINDINGS: There is good contrast opacification of the pulmonary arteries  and no evidence of filling defect to suggest pulmonary embolic disease.  There is no evidence of thoracic aortic aneurysm or dissection, no  pericardial or pleural effusion or evidence of mediastinal adenopathy.     Lung window images show no evidence of pneumonia,  edema or other active  disease.     Included images of the upper abdomen show no significant abnormalities  of the visualized portions of the liver, gallbladder, spleen, pancreas,  adrenal glands, or upper renal poles. The patient appears to have had  previous gastric bypass. No upper abdominal inflammatory changes are  seen.       Impression:       No evidence of active chest disease. In particular, no  evidence of pulmonary embolus or aortic dissection.     DICTATED:     06/01/2018  EDITED/ls :     06/01/2018      This report was finalized on 6/1/2018 11:44 PM by DR. Jony Her MD.       XR Chest 1 View [[id number]] Collected:  06/01/18 1626     Updated:  06/01/18 1709    Narrative:       EXAMINATION: XR CHEST 1 VW- 06/01/2018     INDICATION: Chest Pain triage protocol      COMPARISON: 09/08/2016     FINDINGS: Lungs are clear without focal opacity. No pleural effusion or  pneumothorax. Cardiomediastinal silhouette is within normal limits.           Impression:       No acute cardiopulmonary abnormality.     D:  06/01/2018  E:  06/01/2018     This report was finalized on 6/1/2018 5:07 PM by Josh Celaya.                Assessment/Plan   Assessment / Plan     Hospital Problem List     * (Principal)Chest pain    Depression (Chronic)    Obesity (Chronic)    Chronic pain (Chronic)    Acute respiratory failure with hypoxia            Assessment & Plan:    1. Atypical chest pain  - Clears as tolerated and NPO after midnight.  Consult GI in AM for possible EGD. IV fluids. IV anti-emetics. IV/PO meds for pain and GI. Trial valium for spasms, defer CCB as best SBP is 100.  - Note recent change of ketogenic diet since Tuesday.  - Trend troponin and EKG. Negative d dimer, negative CTA chest. Tele. Neg lipase. S/o right oopherectomy and appy, and ricky-en-y, still has gallbladder.  - CTA chest does not reveal abnormalities to what of abdomen is able to be visualized, defer abd/pelvis at this time.  - Given aspirin load in ED, re-eval daily need in AM once further workup complete. Has received fentanyl, dilaudid, maalox+viscous lidocaine, zofran, protonix, saline bolus without relief.  - Stress test and ECHO in AM given concurrent hypoxia and family history of heart problems.  - Electrolytes.  - No injury/MSK.  - Rare social ETOH. Not on NSAIDs.  - Not anxious on exam.    2. Acute respiratory failure with hypoxia:  - Possibly atelectasis due to decreased inspiratory effort with chest pain.  - Respiratory panel PCR, echocardiogram  - No known underlying lung disease but hx lighter tobacco.  - Continuous pulse ox.    3. Otherwise continue chronic POC.    DVT prophylaxis: Teds/scuds, if not discharged may start heparin SQ after possible endoscopy.    CODE STATUS:  Full Code    Admission Status:  I believe this patient meets INPATIENT status due to the need for care which can only be reasonably provided in an hospital setting such as aggressive/expedited ancillary services and/or consultation services, the necessity for IV medications, close physician monitoring and/or the possible need for procedures.  In such, I feel patient’s risk for adverse outcomes and need for care warrant INPATIENT evaluation and predict the patient’s care encounter to likely last beyond 2 midnights.      Electronically signed by ASIA Conti, 06/01/18, 10:46 PM.      Brief Attending Admission Attestation     I have seen and examined the patient, performing an  "independent face-to-face diagnostic evaluation with plan of care reviewed and developed with the advanced practice clinician (APC).      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  In the ED, she was noted to be hypoxic on room air.  She had negative D-dimer and a normal CTA chest.  Troponins negative x 3 and EKG without ischemic changes.  She does have a family history of cardiac arrest in her mother.  She did not have any improvement in her chest pain with GI cocktail.  She notes that she has recently started a ketogenic diet and has had some muscle spasms in her legs since starting.  She has some discomfort with deep palpation under left breast but this does not reproduce the pain she describes.  Due to persistent pain and hypoxia, hospitalist service asked to admit for further workup.      Attending Physical Exam:  Constitutional: No acute distress, awake, alert, laying in bed, intermittently uncomfortable due to pain  Eyes: PERRLA, sclerae anicteric, no conjunctival injection  HENT: NCAT, mucous membranes moist  Neck: Supple, trachea midline  Respiratory: Clear to auscultation bilaterally, nonlabored respirations   Cardiovascular: RRR, no murmurs, rubs, or gallops  Gastrointestinal: Positive bowel sounds, soft, nontender, nondistended  Musculoskeletal: No bilateral ankle edema, no clubbing or cyanosis to extremities, mild pain with deep palpation of chest wall beneath left breast  Psychiatric: Appropriate affect, cooperative  Neurologic: Oriented x 3, Cranial Nerves grossly intact to confrontation, speech clear  Skin: No rashes      Brief Assessment/Plan :  See above for further detailed assessment and plan developed with APC which I have reviewed and/or edited.      Electronically signed by Glenda Burch MD, 06/02/18, " "2:40 AM.         Electronically signed by Glenda Burch MD at 2018  2:48 AM         [address] [address]  Phone:  [phone]  Fax:   Date Ordered: 2018         Patient:  Haylee Hayes MRN:  [id number]   [address]  [facility] [address] :  1970  SSN:    Phone: No phone on record Sex:  F     Weight: 123 kg (271 lb)         Ht Readings from Last 1 Encounters:   18 165.1 cm (65\")         Oxygen Therapy         (Order ID: [id number])    Diagnosis:  Intractable pain (R52 [ICD-10-CM] 780.96 [ICD-9-CM])  Hypoxia (R09.02 [ICD-10-CM] 799.02 [ICD-9-CM])  Atypical chest pain (R07.89 [ICD-10-CM] 786.59 [ICD-9-CM])  Acute respiratory failure with hypoxia (J96.01 [ICD-10-CM] 518.81 [ICD-9-CM])   Quantity:  1     Delivery Modality: Nasal Cannula  Liters Per Minute: 2  Duration: During Sleep  Equipment:  Oxygen Concentrator &  &  Portable Gaseous Oxygen System & Contents &  Conserving Regulator  The face to face evaluation was performed on: 2018  Length of Need (99 Months = Lifetime): 6 Months (Have pt follow up with her PCP if oxygen is needed after 6 months)            Verbal Order Mode: Telephone with readback   Authorizing Provider: Shazia Purdy MD  Authorizing Provider's NPI: [id number]     Order Entered By: Catherine Franco 2018 10:23 AM     Electronically signed by:  Dr. Shazia Purdy/ Catherine Franco RN                 Vital Signs (last 2 days)     Date/Time  Temp  Pulse  Resp  BP  Device (Oxygen Therapy)  SpO2   18 0932  --  --  --  --  room air  --   18 0826  --  75  --  --  --   89   18 0732  96.7 (35.9)  52  18   79/43  --  96   18  --  --  --  --  --   18  --  57  --  --  --   1849  --  56  --  --  --   18  --  59  --  --  --   18  --  --  --  --  nasal cannula  --   18 0413  --  --  --  --  " nasal cannula  --   06/04/18 0329  --  --  --  --  room air   88   06/04/18 0309  98 (36.7)  --  16  110/70  --  --   06/04/18 0303  --  --  --  --  room air at rest   87

## 2018-06-05 ENCOUNTER — TRANSITIONAL CARE MANAGEMENT TELEPHONE ENCOUNTER (OUTPATIENT)
Dept: FAMILY MEDICINE CLINIC | Facility: CLINIC | Age: 48
End: 2018-06-05

## 2018-06-05 LAB
CYTO UR: NORMAL
LAB AP CASE REPORT: NORMAL
LAB AP CLINICAL INFORMATION: NORMAL
Lab: NORMAL
PATH REPORT.FINAL DX SPEC: NORMAL
PATH REPORT.GROSS SPEC: NORMAL

## 2018-06-06 ENCOUNTER — CONSULT (OUTPATIENT)
Dept: SLEEP MEDICINE | Facility: HOSPITAL | Age: 48
End: 2018-06-06

## 2018-06-06 VITALS
BODY MASS INDEX: 44.65 KG/M2 | HEIGHT: 65 IN | SYSTOLIC BLOOD PRESSURE: 114 MMHG | WEIGHT: 268 LBS | OXYGEN SATURATION: 96 % | DIASTOLIC BLOOD PRESSURE: 62 MMHG | HEART RATE: 66 BPM

## 2018-06-06 DIAGNOSIS — R06.83 SNORING: Primary | ICD-10-CM

## 2018-06-06 DIAGNOSIS — IMO0001 CLASS 3 OBESITY DUE TO EXCESS CALORIES WITHOUT SERIOUS COMORBIDITY WITH BODY MASS INDEX (BMI) OF 40.0 TO 44.9 IN ADULT: ICD-10-CM

## 2018-06-06 DIAGNOSIS — G47.33 OBSTRUCTIVE SLEEP APNEA, ADULT: ICD-10-CM

## 2018-06-06 LAB
FLUAV RNA SPEC QL NAA+PROBE: NEGATIVE
FLUBV RNA SPEC QL NAA+PROBE: NEGATIVE
HADV DNA SPEC QL NAA+PROBE: NEGATIVE
HMPV RNA SPEC QL NAA+PROBE: NEGATIVE
HPIV1 RNA SPEC QL NAA+PROBE: NEGATIVE
HPIV2 SPEC QL CULT: NEGATIVE
HPIV3 SPEC QL CULT: NEGATIVE
RHINOVIRUS RNA SPEC QL NAA+PROBE: POSITIVE
RSV A: NEGATIVE
RSV B RNA SPEC QL NAA+PROBE: NEGATIVE

## 2018-06-06 PROCEDURE — 99204 OFFICE O/P NEW MOD 45 MIN: CPT | Performed by: INTERNAL MEDICINE

## 2018-06-06 NOTE — PROGRESS NOTES
Subjective   Haylee Hayes is a 48 y.o. female is being seen for consultation today at the request of Shazia Purdy MD the evaluation of snoring and possible sleep-disordered breathing.  Her primary care provider is Jennyfer DUNCAN.    History of Present Illness  Patient was recently hospitalized [facility] with some chest discomfort.  She was diagnosed with apparently an ulcer BiPAP referred pain.  She was noted to have low oxygen saturations while sleeping and she is referred here for further evaluation.  She had a sleep study roughly 8 years ago and was told she had obstructive sleep apnea and was on CPAP for a while then she had a gastric bypass procedure and said she did not wear her CPAP after that.  She lost about 80 pounds after the bypass in his gained about 30 of that back.  She had snoring noted for at least the past 3 years again.  She is been told she has occasional apneas.  She denies awakening gasping for breath but says she's usually not rested.  She has awakened with headaches roughly 2 days per week.  She occasionally falls asleep sitting quietly.  She denies any problems while driving.    She has history of loud snoring.  She says she sleeps soundly on her side.  She has back pain keeps her from sleeping on her back.  She has awakened with a dry mouth and sore throat.  She denies ever breaking her nose.  She has occasional reflux symptoms she is now on medications.  She denies hypnagogic hallucinations sleep paralysis.  She says she has occasional kicking and feels like she has restless leg syndrome though she is been on Neurontin taking 600 mg twice a day.    She goes to bed about 3 AM and will fall asleep in 30 minutes.  She awakens she says probably 6-7 times during the night.  She thinks she gets about 7 hours of sleep but feels tired on arising.  She denies any history of hypertension diabetes or coronary artery disease.  Allergies   Allergen Reactions   •  Coumadin [Warfarin Sodium] Anaphylaxis     Swelling of throat. ADDISONE DVT in 2005 when inpatient for unknown cause, did fine on heparin gtt, home on coumadin and anaphylaxis, completed and tolerated 6 months lovenox.          Current Outpatient Prescriptions:   •  buPROPion SR (WELLBUTRIN SR) 150 MG 12 hr tablet, Take 300 mg by mouth Daily., Disp: , Rfl:   •  citalopram (CeleXA) 40 MG tablet, Take 40 mg by mouth daily., Disp: , Rfl:   •  gabapentin (NEURONTIN) 600 MG tablet, Take 600 mg by mouth 2 (Two) Times a Day., Disp: , Rfl:   •  HYDROcodone-acetaminophen (NORCO) 5-325 MG per tablet, Take 2 tablets by mouth Every 6 (Six) Hours As Needed for Moderate Pain ., Disp: 36 tablet, Rfl: 0  •  Methocarbamol (ROBAXIN PO), Take  by mouth As Needed., Disp: , Rfl:   •  pantoprazole (PROTONIX) 40 MG EC tablet, Take 1 tablet by mouth Daily., Disp: 30 tablet, Rfl: 0  •  temazepam (RESTORIL) 15 MG capsule, Take 15 mg by mouth As Needed for Sleep., Disp: , Rfl:     Smoking status: Former Smoker                                                              Packs/day: 0.25      Years: 5.00         Types: Cigarettes  Smokeless tobacco: Never Used                      Comment: About a third a pack a day, quit 2/2018       History   Alcohol Use   • Yes     Comment: about once a month 1-2 drinks at a dinner, no hx heavy use or abuse other than more social in college years       Caffeine: She has 3 cups coffee per day    Past Medical History:   Diagnosis Date   • Acute respiratory failure with hypoxia 6/2/2018   • Chest pain 6/1/2018   • Chronic pain 6/1/2018   • Depression    • DVT (deep venous thrombosis)     left arm, 2005 андрей   • Kidney stone     around early 2000s, has had some prior to that as well, passed all of them without surgery   • Obesity 6/1/2018   • Peptic ulceration    • Pneumonia     never had to be hospitalized for it       Past Surgical History:   Procedure Laterality Date   • APPENDECTOMY      age 15, they removed  "appendix while doing oopherectomy   •  SECTION     • ENDOSCOPY N/A 6/3/2018    Procedure: ESOPHAGOGASTRODUODENOSCOPY;  Surgeon: Mark I Brunner, MD;  Location: [facility] ENDOSCOPY;  Service: Gastroenterology   • GASTRECTOMY      [facility]   • RIGHT OOPHORECTOMY      torque   • TONSILLECTOMY      age 17       Family History   Problem Relation Age of Onset   • Atrial fibrillation Mother    • Heart failure Mother    • Diabetes Father    • Stroke Father    • No Known Problems Son    • Coronary artery disease Maternal Grandmother    • Valvular heart disease Maternal Grandmother    • Tuberculosis Maternal Grandfather    • Tuberculosis Paternal Grandfather    • Breast cancer Neg Hx    • Ovarian cancer Neg Hx    • Cancer Neg Hx    Has a brother with obstructive sleep apnea and excessive daytime sleepiness.    The following portions of the patient's history were reviewed and updated as appropriate: allergies, current medications, past family history, past medical history, past social history, past surgical history and problem list.    Review of Systems   Constitutional: Positive for fatigue.   HENT: Positive for hearing loss, postnasal drip and voice change.    Eyes: Positive for visual disturbance.   Respiratory: Negative.    Cardiovascular: Positive for chest pain.   Gastrointestinal: Positive for nausea and vomiting.        She complains of change in bowel movements and frequent heartburn   Endocrine: Negative.    Genitourinary: Negative.    Musculoskeletal: Positive for arthralgias, back pain, joint swelling and myalgias.   Skin: Negative.    Allergic/Immunologic: Negative.    Neurological: Positive for dizziness, light-headedness and headaches.   Hematological: Negative.    Psychiatric/Behavioral: Positive for confusion, decreased concentration and dysphoric mood. The patient is nervous/anxious.     Hull scores 14/24    Objective     /62   Pulse 66   Ht 165.1 cm (65\")   Wt 122 kg (268 lb)  "  SpO2 96%   BMI 44.60 kg/m²      Physical Exam   Constitutional: She is oriented to person, place, and time. She appears well-developed and well-nourished.   She is morbidly obese.   HENT:   Head: Normocephalic and atraumatic.   She has nasal airway narrowing with some septal deviation the left and Mallampati class II anatomy.   Eyes: EOM are normal. Pupils are equal, round, and reactive to light.   Neck: Normal range of motion. Neck supple.   Cardiovascular: Normal rate, regular rhythm and normal heart sounds.    Pulmonary/Chest: Effort normal and breath sounds normal.   Abdominal: Soft. Bowel sounds are normal.   Musculoskeletal: Normal range of motion. She exhibits no edema.   Neurological: She is alert and oriented to person, place, and time.   Skin: Skin is warm and dry.   Psychiatric: She has a normal mood and affect. Her behavior is normal.         Assessment/Plan   Haylee was seen today for sleeping problem.    Diagnoses and all orders for this visit:    Snoring  -     Home Sleep Study; Future    Obstructive sleep apnea, adult  -     Home Sleep Study; Future    Class 3 obesity due to excess calories without serious comorbidity with body mass index (BMI) of 40.0 to 44.9 in adult     patient is a history of snoring and nonrestorative sleep and was recently noted to drop her oxygen while sleeping.  Suspect she still has significant obstructive sleep apnea.  We will plan to proceed to home sleep testing.  I've discussed possible therapies including CPAP, weight control, oral appliances, and surgery.  I've also discussed the long-term consequences of untreated obstructive sleep apnea.  She is to return in roughly 2 weeks after that.  She has history of occasional leg movements but is on Neurontin should be addressing this.  We'll continue to observe this.  She is encouraged to lose weight.  She is encouraged to avoid alcohol and sedatives close to bedtime.  She is encouraged practice lateral position  sleep.         Gunnar Mera MD [facility]  Sleep Medicine  Pulmonary and Critical Care Medicine

## 2018-06-07 ENCOUNTER — LAB (OUTPATIENT)
Dept: LAB | Facility: HOSPITAL | Age: 48
End: 2018-06-07

## 2018-06-07 ENCOUNTER — OFFICE VISIT (OUTPATIENT)
Dept: FAMILY MEDICINE CLINIC | Facility: CLINIC | Age: 48
End: 2018-06-07

## 2018-06-07 ENCOUNTER — APPOINTMENT (OUTPATIENT)
Dept: LAB | Facility: HOSPITAL | Age: 48
End: 2018-06-07

## 2018-06-07 VITALS
DIASTOLIC BLOOD PRESSURE: 72 MMHG | HEART RATE: 77 BPM | HEIGHT: 65 IN | OXYGEN SATURATION: 98 % | WEIGHT: 265 LBS | SYSTOLIC BLOOD PRESSURE: 118 MMHG | BODY MASS INDEX: 44.15 KG/M2

## 2018-06-07 DIAGNOSIS — I95.9 HYPOTENSION, UNSPECIFIED HYPOTENSION TYPE: ICD-10-CM

## 2018-06-07 DIAGNOSIS — R06.83 SNORING: ICD-10-CM

## 2018-06-07 DIAGNOSIS — R10.32 LLQ ABDOMINAL PAIN: ICD-10-CM

## 2018-06-07 DIAGNOSIS — R10.11 RUQ ABDOMINAL PAIN: ICD-10-CM

## 2018-06-07 DIAGNOSIS — K25.9 GASTRIC ULCER WITHOUT HEMORRHAGE OR PERFORATION, UNSPECIFIED CHRONICITY: ICD-10-CM

## 2018-06-07 DIAGNOSIS — Z98.84 H/O GASTRIC BYPASS: ICD-10-CM

## 2018-06-07 DIAGNOSIS — J96.01 ACUTE RESPIRATORY FAILURE WITH HYPOXIA (HCC): ICD-10-CM

## 2018-06-07 DIAGNOSIS — R53.83 OTHER FATIGUE: ICD-10-CM

## 2018-06-07 DIAGNOSIS — R07.89 ATYPICAL CHEST PAIN: ICD-10-CM

## 2018-06-07 DIAGNOSIS — R10.11 RUQ ABDOMINAL PAIN: Primary | ICD-10-CM

## 2018-06-07 LAB
CRP SERPL-MCNC: 0.84 MG/DL (ref 0–1)
IRON 24H UR-MRATE: 44 MCG/DL (ref 50–175)
VIT B12 BLD-MCNC: 679 PG/ML (ref 211–911)

## 2018-06-07 PROCEDURE — 99496 TRANSJ CARE MGMT HIGH F2F 7D: CPT | Performed by: PHYSICIAN ASSISTANT

## 2018-06-07 PROCEDURE — 36415 COLL VENOUS BLD VENIPUNCTURE: CPT

## 2018-06-07 PROCEDURE — 86140 C-REACTIVE PROTEIN: CPT

## 2018-06-07 PROCEDURE — 83540 ASSAY OF IRON: CPT

## 2018-06-07 PROCEDURE — 82607 VITAMIN B-12: CPT

## 2018-06-07 RX ORDER — PANTOPRAZOLE SODIUM 40 MG/1
40 TABLET, DELAYED RELEASE ORAL
Qty: 120 TABLET | Refills: 0 | Status: SHIPPED | OUTPATIENT
Start: 2018-06-07 | End: 2018-06-20 | Stop reason: SDUPTHER

## 2018-06-07 NOTE — PROGRESS NOTES
"Transitional Care Follow Up Visit  Subjective     Haylee Hayes is a 48 y.o. female who presents for a transitional care management visit.  Patient presented to the [facility] for atypical chest pain on 06/01.  Patient was admitted with atypical chest pain, acute hypoxia and gastric ulceration.  She underwent a full cardiac workup with an echo, stress test with myocardial perfusion, CTA, EGD and labs which were all relatively unremarkable.  The EGD showed an ulcer without hemorrhage or perforation that was most likely not the cause of patient's reported pain.  Patient was started on Protonix 40 mg daily and has started this without much improvement in symptoms.  She describes epigastric pain.  The pain appears to be present and worse after eating although she has had it without correlation to food as well.  She had a bowel movement yesterday and reports that it looked pale and chalky.  She states that she has been doing the ketogenic diet for the last week.  She is taking a supplement drink to help with the \"keto flu\".  She has history of gastric bypass.  She denies fever, chills, nausea, vomiting or severe abdominal pain.  She reports fatigue and generally feeling unwell.  She denies shortness of breath or chest pain today.  Her  is present during visit.      She Within 48 business hours after discharge our office contacted her via telephone to coordinate her care and needs.      I reviewed and discussed the details of that call along with the discharge summary, hospital problems, inpatient lab results, inpatient diagnostic studies, and consultation reports with Haylee.     Current outpatient and discharge medications have been reconciled for the patient.    Date of TCM Phone Call 6/6/2018 6/7/2018   [facility]   Date of Admission 6/1/2018 6/1/2018   Date of Discharge 6/4/2018 6/4/2018   Discharge Disposition Home or Self Care Home or Self Care "     Risk for Readmission (LACE) Score: 3 (6/4/2018  6:00 AM)    History of Present Illness   Course During Hospital Stay:  06/01 - 06/04     The following portions of the patient's history were reviewed and updated as appropriate: allergies, current medications, past family history, past medical history, past social history, past surgical history and problem list.    Review of Systems   Constitutional: Positive for activity change, appetite change and fatigue. Negative for chills and fever.   Respiratory: Negative for cough, chest tightness and shortness of breath.    Cardiovascular: Negative for chest pain and leg swelling.   Gastrointestinal: Positive for abdominal pain (epigastric pain) and constipation (1 BM in a week). Negative for abdominal distention, blood in stool, nausea and vomiting.        Pale and chalky stool yesterday   Genitourinary: Negative for difficulty urinating, dysuria, frequency and urgency.   Musculoskeletal: Negative for back pain and gait problem.   Neurological: Positive for weakness and light-headedness. Negative for headaches.   Psychiatric/Behavioral: Negative for confusion. The patient is not nervous/anxious.        Objective   Physical Exam   Constitutional: She is oriented to person, place, and time. She appears well-developed and well-nourished. No distress.   HENT:   Head: Normocephalic.   Eyes: Conjunctivae are normal. No scleral icterus.   Neck: Normal range of motion.   Cardiovascular: Normal rate, regular rhythm and normal heart sounds.  Exam reveals no gallop and no friction rub.    No murmur heard.  Pulmonary/Chest: Effort normal and breath sounds normal. No respiratory distress. She has no wheezes. She has no rales.   Abdominal: Soft. Bowel sounds are normal. She exhibits no distension. There is tenderness in the right upper quadrant, epigastric area and left lower quadrant. There is guarding and positive Alonzo's sign. There is no rigidity and no rebound.    Musculoskeletal: She exhibits no edema.   Neurological: She is alert and oriented to person, place, and time.   Skin: She is not diaphoretic.   Psychiatric: She has a normal mood and affect. Judgment normal.       Assessment/Plan   Haylee was seen today for transitional care management.    Diagnoses and all orders for this visit:    RUQ abdominal pain  -positive Alonzo's sign  -probable cholelithiasis with recent ketogenic diet  -labs reviewed from hospital and all wnl  -     C-reactive Protein; Future  -     US Gallbladder; Future    LLQ abdominal pain  -most likely due to constipation  -afebrile with no history of diverticulosis  -discussed obtaining a CT abdo/pelvis to r/o diverticulitis and patient would like to wait until results of ultrasound are completed    H/O gastric bypass  -given fatigue and history of bypass surgery will check nutrients  -     Iron; Future  -     Vitamin B12; Future    Other fatigue  -most likely due to ketogenic diet  -will check iron and B12  -     Iron; Future    Atypical chest pain  -admitted at hospital for   -complete cardiac work-up was unremarkable  -today pain is more epigastric, no chest pain    Acute respiratory failure with hypoxia  -lungs CTA  -pulse ox 98%    Hypotension, unspecified hypotension type  -patient reports hypotension at home  -BP is stable today and was okay yesterday at appointment with Dr. Mera    Snoring  -pending sleep study with Dr. Mera  -will most likely need CPAP, was previously dx with CECILLE prior to gastric bypass and has gained additional weight    Gastric ulcer without hemorrhage or perforation, unspecified chronicity  -found on EGD during hospitalization  -does not have follow-up with GI currently  -started on protonix 40 mg QD  -will increase to 2 tabs x14 days  -     pantoprazole (PROTONIX) 40 MG EC tablet; Take 1 tablet by mouth 2 (Two) Times a Day Before Meals.    Return in about 1 week (around 6/14/2018) for Next scheduled follow  up.

## 2018-06-08 ENCOUNTER — HOSPITAL ENCOUNTER (OUTPATIENT)
Dept: ULTRASOUND IMAGING | Facility: HOSPITAL | Age: 48
Discharge: HOME | End: 2018-06-08
Admitting: PHYSICIAN ASSISTANT

## 2018-06-08 ENCOUNTER — TELEPHONE (OUTPATIENT)
Dept: FAMILY MEDICINE CLINIC | Facility: CLINIC | Age: 48
End: 2018-06-08

## 2018-06-08 DIAGNOSIS — R10.11 RUQ ABDOMINAL PAIN: ICD-10-CM

## 2018-06-08 DIAGNOSIS — K80.20 CALCULUS OF GALLBLADDER WITHOUT CHOLECYSTITIS WITHOUT OBSTRUCTION: Primary | ICD-10-CM

## 2018-06-08 PROCEDURE — 76705 ECHO EXAM OF ABDOMEN: CPT

## 2018-06-08 RX ORDER — FERROUS SULFATE 325(65) MG
325 TABLET ORAL
Qty: 90 TABLET | Refills: 0 | Status: SHIPPED | OUTPATIENT
Start: 2018-06-08 | End: 2019-03-08

## 2018-06-08 NOTE — TELEPHONE ENCOUNTER
Called pt back and informed her of US results and that Jennyfer would be placing a referral. Pt verbalized understanding and had no further questions.

## 2018-06-08 NOTE — TELEPHONE ENCOUNTER
Patient called back and I told her that the only concern with her labs was her iron level and that an iron supplement was sent to her pharmacy. Per Jennyfer.    Asked patient how she was feeling and she stated that she is feeling about the same as yesterday and is concerned about having to go back to work tomorrow.    Patient also stated that she had the U/S of her gallbladder done yesterday and would like a call when the results are available please.

## 2018-06-13 NOTE — TELEPHONE ENCOUNTER
LVM for pt to call back to inform of recommendations.   Work excuse printed and put up front for pt .

## 2018-06-14 ENCOUNTER — HOSPITAL ENCOUNTER (OUTPATIENT)
Dept: SLEEP MEDICINE | Facility: HOSPITAL | Age: 48
End: 2018-06-14
Attending: INTERNAL MEDICINE

## 2018-06-14 NOTE — TELEPHONE ENCOUNTER
LVM for pt informing her of work excuse being ready for . Advised pt to call back with any questions.

## 2018-06-19 ENCOUNTER — HOSPITAL ENCOUNTER (OUTPATIENT)
Dept: SLEEP MEDICINE | Facility: HOSPITAL | Age: 48
Discharge: HOME | End: 2018-06-19
Attending: INTERNAL MEDICINE | Admitting: INTERNAL MEDICINE

## 2018-06-19 VITALS
DIASTOLIC BLOOD PRESSURE: 67 MMHG | HEART RATE: 61 BPM | HEIGHT: 65 IN | SYSTOLIC BLOOD PRESSURE: 115 MMHG | WEIGHT: 270.8 LBS | BODY MASS INDEX: 45.12 KG/M2 | OXYGEN SATURATION: 94 %

## 2018-06-19 DIAGNOSIS — G47.33 OBSTRUCTIVE SLEEP APNEA, ADULT: ICD-10-CM

## 2018-06-19 DIAGNOSIS — R06.83 SNORING: ICD-10-CM

## 2018-06-19 PROCEDURE — 95806 SLEEP STUDY UNATT&RESP EFFT: CPT

## 2018-06-19 PROCEDURE — 95806 SLEEP STUDY UNATT&RESP EFFT: CPT | Performed by: INTERNAL MEDICINE

## 2018-06-20 ENCOUNTER — RESULTS ENCOUNTER (OUTPATIENT)
Dept: FAMILY MEDICINE CLINIC | Facility: CLINIC | Age: 48
End: 2018-06-20

## 2018-06-20 ENCOUNTER — OFFICE VISIT (OUTPATIENT)
Dept: FAMILY MEDICINE CLINIC | Facility: CLINIC | Age: 48
End: 2018-06-20

## 2018-06-20 VITALS
DIASTOLIC BLOOD PRESSURE: 70 MMHG | BODY MASS INDEX: 44.98 KG/M2 | OXYGEN SATURATION: 98 % | WEIGHT: 270 LBS | SYSTOLIC BLOOD PRESSURE: 110 MMHG | HEIGHT: 65 IN | HEART RATE: 67 BPM

## 2018-06-20 DIAGNOSIS — R52 ACUTE PAIN: ICD-10-CM

## 2018-06-20 DIAGNOSIS — R11.0 NAUSEA: ICD-10-CM

## 2018-06-20 DIAGNOSIS — K80.00 CALCULUS OF GALLBLADDER WITH ACUTE CHOLECYSTITIS WITHOUT OBSTRUCTION: Primary | ICD-10-CM

## 2018-06-20 DIAGNOSIS — F41.8 DEPRESSION WITH ANXIETY: ICD-10-CM

## 2018-06-20 DIAGNOSIS — K25.9 GASTRIC ULCER WITHOUT HEMORRHAGE OR PERFORATION, UNSPECIFIED CHRONICITY: ICD-10-CM

## 2018-06-20 PROCEDURE — 99214 OFFICE O/P EST MOD 30 MIN: CPT | Performed by: PHYSICIAN ASSISTANT

## 2018-06-20 RX ORDER — BUPROPION HYDROCHLORIDE 300 MG/1
300 TABLET ORAL DAILY
Qty: 90 TABLET | Refills: 3 | Status: SHIPPED | OUTPATIENT
Start: 2018-06-20 | End: 2019-03-08 | Stop reason: SDUPTHER

## 2018-06-20 RX ORDER — ONDANSETRON 4 MG/1
4 TABLET, FILM COATED ORAL EVERY 8 HOURS PRN
Qty: 30 TABLET | Refills: 1 | Status: SHIPPED | OUTPATIENT
Start: 2018-06-20 | End: 2019-03-08

## 2018-06-20 RX ORDER — HYDROCODONE BITARTRATE AND ACETAMINOPHEN 5; 325 MG/1; MG/1
1 TABLET ORAL EVERY 8 HOURS PRN
Qty: 30 TABLET | Refills: 0 | Status: SHIPPED | OUTPATIENT
Start: 2018-06-20 | End: 2018-06-20 | Stop reason: SDUPTHER

## 2018-06-20 RX ORDER — CITALOPRAM 40 MG/1
40 TABLET ORAL DAILY
Qty: 90 TABLET | Refills: 3 | Status: SHIPPED | OUTPATIENT
Start: 2018-06-20 | End: 2019-03-08 | Stop reason: SDUPTHER

## 2018-06-20 RX ORDER — HYDROCODONE BITARTRATE AND ACETAMINOPHEN 5; 325 MG/1; MG/1
1 TABLET ORAL EVERY 8 HOURS PRN
Qty: 30 TABLET | Refills: 0 | Status: SHIPPED | OUTPATIENT
Start: 2018-06-20 | End: 2019-03-08

## 2018-06-20 RX ORDER — PANTOPRAZOLE SODIUM 40 MG/1
40 TABLET, DELAYED RELEASE ORAL
Qty: 180 TABLET | Refills: 0 | Status: SHIPPED | OUTPATIENT
Start: 2018-06-20 | End: 2019-03-08 | Stop reason: SDUPTHER

## 2018-06-20 NOTE — PROGRESS NOTES
I have reviewed the notes, assessments, and/or procedures performed by LEROY Anne, I concur with her/his documentation of Haylee Hayes.

## 2018-06-20 NOTE — PROGRESS NOTES
Chief Complaint   Patient presents with   • Follow-up   • Med Refill       HPI     Haylee Hayes is a pleasant 48 y.o. female who is here for routine follow-up from previous visit on 18 for abdominal pain.  Patient has been seen by GI, Dr. Singh, who confirms that she has acute cholecystitis with surgery planned for .  Patient reports that her nausea and abdominal pain have improved slightly from onset.  She still has considerable pain depending on what she eats.  She was requesting today for some nausea medication and pain medication to hold her over until her surgery.  She was given Maple in the ER and has been taking 1-2 tablets a day depending on her pain.  She reports that she still has 2 tablets of the Norco.  She is also requesting refills on her depression medication which she reports she has been on for a long time and has kept her mood stable.      Past Medical History:   Diagnosis Date   • Acute respiratory failure with hypoxia 2018   • Chest pain 2018   • Chronic pain 2018   • Depression    • DVT (deep venous thrombosis)     left arm,  андрей   • Kidney stone     around early , has had some prior to that as well, passed all of them without surgery   • Obesity 2018   • Peptic ulceration    • Pneumonia     never had to be hospitalized for it       Past Surgical History:   Procedure Laterality Date   • APPENDECTOMY      age 15, they removed appendix while doing oopherectomy   •  SECTION     • ENDOSCOPY N/A 6/3/2018    Procedure: ESOPHAGOGASTRODUODENOSCOPY;  Surgeon: Mark I Brunner, MD;  Location: [facility] ENDOSCOPY;  Service: Gastroenterology   • GASTRECTOMY      [facility]   • RIGHT OOPHORECTOMY      torque   • TONSILLECTOMY      age 17       Family History   Problem Relation Age of Onset   • Atrial fibrillation Mother    • Heart failure Mother    • Diabetes Father    • Stroke Father    • No Known Problems Son    • Coronary artery disease Maternal  Grandmother    • Valvular heart disease Maternal Grandmother    • Tuberculosis Maternal Grandfather    • Tuberculosis Paternal Grandfather    • Breast cancer Neg Hx    • Ovarian cancer Neg Hx    • Cancer Neg Hx        Social History     Social History   • Marital status:      Spouse name: N/A   • Number of children: N/A   • Years of education: N/A     Occupational History   • Not on file.     Social History Main Topics   • Smoking status: Former Smoker     Packs/day: 0.25     Types: Cigarettes   • Smokeless tobacco: Never Used      Comment: About a third a pack a day, quit 2/2018   • Alcohol use Yes      Comment: about once a month 1-2 drinks at a dinner, no hx heavy use or abuse other than more social in college years   • Drug use: No   • Sexual activity: Defer     Other Topics Concern   • Not on file     Social History Narrative    Mrs. Hayes is a 48 year old white  female. She lives in [place]. They have two sons. 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 She does not have an advanced directive.       Allergies   Allergen Reactions   • Coumadin [Warfarin Sodium] Anaphylaxis     Swelling of throat. LUE DVT in 2005 when inpatient for unknown cause, did fine on heparin gtt, home on coumadin and anaphylaxis, completed and tolerated 6 months lovenox.   • Dilaudid [Hydromorphone Hcl] Other (See Comments)     Oxygen, blood pressure drops that takes several days to resolve, affects loc       ROS    Review of Systems   Constitutional: Positive for appetite change and fatigue. Negative for activity change and unexpected weight loss.   Respiratory: Positive for chest tightness.    Cardiovascular: Negative for chest pain.   Gastrointestinal: Positive for abdominal pain, nausea and GERD. Negative for abdominal distention.   Psychiatric/Behavioral: Negative for depressed mood.       Vitals:    06/20/18 1409   BP: 110/70   BP Location: Left arm   Patient  "Position: Sitting   Cuff Size: Adult   Pulse: 67   SpO2: 98%   Weight: 122 kg (270 lb)   Height: 165.1 cm (65\")         Current Outpatient Prescriptions:   •  citalopram (CeleXA) 40 MG tablet, Take 1 tablet by mouth Daily., Disp: 90 tablet, Rfl: 3  •  ferrous sulfate 325 (65 FE) MG tablet, Take 1 tablet by mouth Daily With Breakfast., Disp: 90 tablet, Rfl: 0  •  gabapentin (NEURONTIN) 600 MG tablet, Take 600 mg by mouth 2 (Two) Times a Day., Disp: , Rfl:   •  HYDROcodone-acetaminophen (NORCO) 5-325 MG per tablet, Take 2 tablets by mouth Every 6 (Six) Hours As Needed for Moderate Pain ., Disp: 36 tablet, Rfl: 0  •  pantoprazole (PROTONIX) 40 MG EC tablet, Take 1 tablet by mouth 2 (Two) Times a Day Before Meals., Disp: 180 tablet, Rfl: 0  •  temazepam (RESTORIL) 15 MG capsule, Take 15 mg by mouth As Needed for Sleep., Disp: , Rfl:   •  buPROPion XL (WELLBUTRIN XL) 300 MG 24 hr tablet, Take 1 tablet by mouth Daily., Disp: 90 tablet, Rfl: 3  •  ondansetron (ZOFRAN) 4 MG tablet, Take 1 tablet by mouth Every 8 (Eight) Hours As Needed for Nausea or Vomiting., Disp: 30 tablet, Rfl: 1    PE    Physical Exam   Constitutional: She is oriented to person, place, and time. Vital signs are normal. She appears well-developed and well-nourished. She is cooperative. No distress. She is morbidly obese.  HENT:   Head: Normocephalic and atraumatic.   Eyes: Conjunctivae are normal.   Neck: Normal range of motion.   Cardiovascular: Normal rate, regular rhythm and normal heart sounds.    Pulmonary/Chest: Effort normal and breath sounds normal. No respiratory distress. She has no wheezes.   Musculoskeletal: Normal range of motion. She exhibits no edema.   Neurological: She is alert and oriented to person, place, and time.   Skin: Skin is warm. No rash noted. She is not diaphoretic. No erythema.   Psychiatric: She has a normal mood and affect. Her behavior is normal. Judgment and thought content normal.       A/P    Haylee was seen today " for follow-up and med refill.    Diagnoses and all orders for this visit:    Calculus of gallbladder with acute cholecystitis without obstruction  -has surgery scheduled for July 11th  -has ongoing pain with certain foods that she eats    Gastric ulcer without hemorrhage or perforation, unspecified chronicity  -compliant on medication  -     pantoprazole (PROTONIX) 40 MG EC tablet; Take 1 tablet by mouth 2 (Two) Times a Day Before Meals.    Depression with anxiety  -stable mood  -will refill medications, patient has been taking for a long time and doing well on them  -     buPROPion XL (WELLBUTRIN XL) 300 MG 24 hr tablet; Take 1 tablet by mouth Daily.  -     citalopram (CeleXA) 40 MG tablet; Take 1 tablet by mouth Daily.    Acute pain  -abdominal discomfort with eating  -was given Norco prn pain from ER and this has helped the pain when needed  -has surgery scheduled for 7/11 and is requesting norco until then to help with pain  -will order linden, UDS and get contract  -advised patient that we wouldn't be able to give her any refills after this  -     Urine Drug Screen - Urine, Clean Catch; Future    Nausea  -episodic, will treat with zofran  -     ondansetron (ZOFRAN) 4 MG tablet; Take 1 tablet by mouth Every 8 (Eight) Hours As Needed for Nausea or Vomiting.         Plan of care reviewed with patient at the conclusion of today's visit. Education was provided regarding diagnosis, management and any prescribed or recommended OTC medications.  Patient verbalizes understanding of and agreement with management plan.    Return in about 4 weeks (around 7/18/2018) for Next scheduled follow up.     Jennyfer Mauricio PA-C

## 2018-06-21 ENCOUNTER — TELEPHONE (OUTPATIENT)
Dept: FAMILY MEDICINE CLINIC | Facility: CLINIC | Age: 48
End: 2018-06-21

## 2018-06-25 NOTE — TELEPHONE ENCOUNTER
Called patient, Advised that we would need to schedule a f/u with Jennyfer for the week of July 23, 2018.  Patient scheduled for July 25, 2018 at 1:30 pm.  Patient prefers this because she works nights.  Patient also inquired about a prescription for Hydrocodone that was supposed to be called in at her last appointment.  Advised that Dr. López had sent in script on 06/20/2018.  Patient says that her actual pharmacy is [facility].  Advised patient I would have pharmacy updated in the system.  Patient states she understands.

## 2018-07-10 ENCOUNTER — ANESTHESIA EVENT (OUTPATIENT)
Dept: PERIOP | Facility: HOSPITAL | Age: 48
End: 2018-07-10

## 2018-07-10 RX ORDER — FAMOTIDINE 10 MG/ML
20 INJECTION, SOLUTION INTRAVENOUS ONCE
Status: CANCELLED | OUTPATIENT
Start: 2018-07-10 | End: 2018-07-10

## 2018-07-11 ENCOUNTER — HOSPITAL ENCOUNTER (OUTPATIENT)
Facility: HOSPITAL | Age: 48
Setting detail: HOSPITAL OUTPATIENT SURGERY
Discharge: HOME | End: 2018-07-11
Attending: SURGERY | Admitting: SURGERY

## 2018-07-11 ENCOUNTER — ANESTHESIA (OUTPATIENT)
Dept: PERIOP | Facility: HOSPITAL | Age: 48
End: 2018-07-11

## 2018-07-11 VITALS
HEART RATE: 69 BPM | DIASTOLIC BLOOD PRESSURE: 55 MMHG | WEIGHT: 270 LBS | TEMPERATURE: 97.9 F | OXYGEN SATURATION: 93 % | HEIGHT: 65 IN | SYSTOLIC BLOOD PRESSURE: 120 MMHG | BODY MASS INDEX: 44.98 KG/M2 | RESPIRATION RATE: 18 BRPM

## 2018-07-11 DIAGNOSIS — K80.20 CHOLELITHIASIS: ICD-10-CM

## 2018-07-11 PROCEDURE — 25010000002 ONDANSETRON PER 1 MG: Performed by: NURSE ANESTHETIST, CERTIFIED REGISTERED

## 2018-07-11 PROCEDURE — 25010000002 PROMETHAZINE PER 50 MG: Performed by: NURSE ANESTHETIST, CERTIFIED REGISTERED

## 2018-07-11 PROCEDURE — 25010000002 FENTANYL CITRATE (PF) 100 MCG/2ML SOLUTION: Performed by: NURSE ANESTHETIST, CERTIFIED REGISTERED

## 2018-07-11 PROCEDURE — 25010000002 DEXAMETHASONE PER 1 MG: Performed by: NURSE ANESTHETIST, CERTIFIED REGISTERED

## 2018-07-11 PROCEDURE — 25010000002 NEOSTIGMINE 10 MG/10ML SOLUTION: Performed by: NURSE ANESTHETIST, CERTIFIED REGISTERED

## 2018-07-11 PROCEDURE — 25010000002 PROPOFOL 10 MG/ML EMULSION: Performed by: NURSE ANESTHETIST, CERTIFIED REGISTERED

## 2018-07-11 PROCEDURE — 88304 TISSUE EXAM BY PATHOLOGIST: CPT | Performed by: SURGERY

## 2018-07-11 RX ORDER — ACETAMINOPHEN 650 MG/1
650 SUPPOSITORY RECTAL ONCE AS NEEDED
Status: DISCONTINUED | OUTPATIENT
Start: 2018-07-11 | End: 2018-07-11 | Stop reason: HOSPADM

## 2018-07-11 RX ORDER — SODIUM CHLORIDE 0.9 % (FLUSH) 0.9 %
1-10 SYRINGE (ML) INJECTION AS NEEDED
Status: DISCONTINUED | OUTPATIENT
Start: 2018-07-11 | End: 2018-07-11 | Stop reason: HOSPADM

## 2018-07-11 RX ORDER — ROCURONIUM BROMIDE 10 MG/ML
INJECTION, SOLUTION INTRAVENOUS AS NEEDED
Status: DISCONTINUED | OUTPATIENT
Start: 2018-07-11 | End: 2018-07-11 | Stop reason: SURG

## 2018-07-11 RX ORDER — ONDANSETRON 2 MG/ML
4 INJECTION INTRAMUSCULAR; INTRAVENOUS ONCE AS NEEDED
Status: COMPLETED | OUTPATIENT
Start: 2018-07-11 | End: 2018-07-11

## 2018-07-11 RX ORDER — PROMETHAZINE HYDROCHLORIDE 25 MG/ML
6.25 INJECTION, SOLUTION INTRAMUSCULAR; INTRAVENOUS ONCE AS NEEDED
Status: COMPLETED | OUTPATIENT
Start: 2018-07-11 | End: 2018-07-11

## 2018-07-11 RX ORDER — MEPERIDINE HYDROCHLORIDE 25 MG/ML
12.5 INJECTION INTRAMUSCULAR; INTRAVENOUS; SUBCUTANEOUS
Status: COMPLETED | OUTPATIENT
Start: 2018-07-11 | End: 2018-07-11

## 2018-07-11 RX ORDER — SODIUM CHLORIDE 9 MG/ML
INJECTION, SOLUTION INTRAVENOUS AS NEEDED
Status: DISCONTINUED | OUTPATIENT
Start: 2018-07-11 | End: 2018-07-11 | Stop reason: HOSPADM

## 2018-07-11 RX ORDER — OXYCODONE AND ACETAMINOPHEN 10; 325 MG/1; MG/1
1 TABLET ORAL EVERY 4 HOURS PRN
Status: DISCONTINUED | OUTPATIENT
Start: 2018-07-11 | End: 2018-07-11 | Stop reason: HOSPADM

## 2018-07-11 RX ORDER — FAMOTIDINE 20 MG/1
20 TABLET, FILM COATED ORAL ONCE
Status: COMPLETED | OUTPATIENT
Start: 2018-07-11 | End: 2018-07-11

## 2018-07-11 RX ORDER — NEOSTIGMINE METHYLSULFATE 1 MG/ML
INJECTION, SOLUTION INTRAVENOUS AS NEEDED
Status: DISCONTINUED | OUTPATIENT
Start: 2018-07-11 | End: 2018-07-11 | Stop reason: SURG

## 2018-07-11 RX ORDER — LIDOCAINE HYDROCHLORIDE 40 MG/ML
SOLUTION TOPICAL AS NEEDED
Status: DISCONTINUED | OUTPATIENT
Start: 2018-07-11 | End: 2018-07-11 | Stop reason: SURG

## 2018-07-11 RX ORDER — GLYCOPYRROLATE 0.2 MG/ML
INJECTION INTRAMUSCULAR; INTRAVENOUS AS NEEDED
Status: DISCONTINUED | OUTPATIENT
Start: 2018-07-11 | End: 2018-07-11 | Stop reason: SURG

## 2018-07-11 RX ORDER — PROMETHAZINE HYDROCHLORIDE 25 MG/1
25 TABLET ORAL ONCE AS NEEDED
Status: COMPLETED | OUTPATIENT
Start: 2018-07-11 | End: 2018-07-11

## 2018-07-11 RX ORDER — IPRATROPIUM BROMIDE AND ALBUTEROL SULFATE 2.5; .5 MG/3ML; MG/3ML
3 SOLUTION RESPIRATORY (INHALATION) ONCE AS NEEDED
Status: DISCONTINUED | OUTPATIENT
Start: 2018-07-11 | End: 2018-07-11 | Stop reason: HOSPADM

## 2018-07-11 RX ORDER — DEXAMETHASONE SODIUM PHOSPHATE 4 MG/ML
INJECTION, SOLUTION INTRA-ARTICULAR; INTRALESIONAL; INTRAMUSCULAR; INTRAVENOUS; SOFT TISSUE AS NEEDED
Status: DISCONTINUED | OUTPATIENT
Start: 2018-07-11 | End: 2018-07-11 | Stop reason: SURG

## 2018-07-11 RX ORDER — ACETAMINOPHEN 325 MG/1
650 TABLET ORAL ONCE AS NEEDED
Status: DISCONTINUED | OUTPATIENT
Start: 2018-07-11 | End: 2018-07-11 | Stop reason: HOSPADM

## 2018-07-11 RX ORDER — PROPOFOL 10 MG/ML
VIAL (ML) INTRAVENOUS AS NEEDED
Status: DISCONTINUED | OUTPATIENT
Start: 2018-07-11 | End: 2018-07-11 | Stop reason: SURG

## 2018-07-11 RX ORDER — PROMETHAZINE HYDROCHLORIDE 25 MG/1
25 SUPPOSITORY RECTAL ONCE AS NEEDED
Status: COMPLETED | OUTPATIENT
Start: 2018-07-11 | End: 2018-07-11

## 2018-07-11 RX ORDER — LIDOCAINE HYDROCHLORIDE 10 MG/ML
0.5 INJECTION, SOLUTION EPIDURAL; INFILTRATION; INTRACAUDAL; PERINEURAL ONCE AS NEEDED
Status: COMPLETED | OUTPATIENT
Start: 2018-07-11 | End: 2018-07-11

## 2018-07-11 RX ORDER — SIMETHICONE 80 MG
80 TABLET,CHEWABLE ORAL 4 TIMES DAILY PRN
Status: DISCONTINUED | OUTPATIENT
Start: 2018-07-11 | End: 2018-07-11 | Stop reason: HOSPADM

## 2018-07-11 RX ORDER — SODIUM CHLORIDE, SODIUM LACTATE, POTASSIUM CHLORIDE, CALCIUM CHLORIDE 600; 310; 30; 20 MG/100ML; MG/100ML; MG/100ML; MG/100ML
9 INJECTION, SOLUTION INTRAVENOUS CONTINUOUS
Status: DISCONTINUED | OUTPATIENT
Start: 2018-07-11 | End: 2018-07-11 | Stop reason: HOSPADM

## 2018-07-11 RX ORDER — FENTANYL CITRATE 50 UG/ML
50 INJECTION, SOLUTION INTRAMUSCULAR; INTRAVENOUS
Status: DISCONTINUED | OUTPATIENT
Start: 2018-07-11 | End: 2018-07-11 | Stop reason: HOSPADM

## 2018-07-11 RX ORDER — OXYCODONE AND ACETAMINOPHEN 7.5; 325 MG/1; MG/1
1 TABLET ORAL ONCE AS NEEDED
Status: DISCONTINUED | OUTPATIENT
Start: 2018-07-11 | End: 2018-07-11 | Stop reason: HOSPADM

## 2018-07-11 RX ORDER — ONDANSETRON 2 MG/ML
INJECTION INTRAMUSCULAR; INTRAVENOUS AS NEEDED
Status: DISCONTINUED | OUTPATIENT
Start: 2018-07-11 | End: 2018-07-11 | Stop reason: SURG

## 2018-07-11 RX ORDER — MAGNESIUM HYDROXIDE 1200 MG/15ML
LIQUID ORAL AS NEEDED
Status: DISCONTINUED | OUTPATIENT
Start: 2018-07-11 | End: 2018-07-11 | Stop reason: HOSPADM

## 2018-07-11 RX ORDER — FENTANYL CITRATE 50 UG/ML
INJECTION, SOLUTION INTRAMUSCULAR; INTRAVENOUS AS NEEDED
Status: DISCONTINUED | OUTPATIENT
Start: 2018-07-11 | End: 2018-07-11 | Stop reason: SURG

## 2018-07-11 RX ADMIN — FENTANYL CITRATE 50 MCG: 50 INJECTION, SOLUTION INTRAMUSCULAR; INTRAVENOUS at 14:22

## 2018-07-11 RX ADMIN — FENTANYL CITRATE 50 MCG: 50 INJECTION, SOLUTION INTRAMUSCULAR; INTRAVENOUS at 13:22

## 2018-07-11 RX ADMIN — NEOSTIGMINE METHYLSULFATE 3 MG: 1 INJECTION, SOLUTION INTRAVENOUS at 13:49

## 2018-07-11 RX ADMIN — SIMETHICONE CHEW TAB 80 MG 80 MG: 80 TABLET ORAL at 15:37

## 2018-07-11 RX ADMIN — PROPOFOL 200 MG: 10 INJECTION, EMULSION INTRAVENOUS at 13:09

## 2018-07-11 RX ADMIN — OXYCODONE HYDROCHLORIDE AND ACETAMINOPHEN 1 TABLET: 10; 325 TABLET ORAL at 15:39

## 2018-07-11 RX ADMIN — MEPERIDINE HYDROCHLORIDE 12.5 MG: 25 INJECTION, SOLUTION INTRAMUSCULAR; INTRAVENOUS; SUBCUTANEOUS at 14:33

## 2018-07-11 RX ADMIN — MEPERIDINE HYDROCHLORIDE 12.5 MG: 25 INJECTION, SOLUTION INTRAMUSCULAR; INTRAVENOUS; SUBCUTANEOUS at 14:43

## 2018-07-11 RX ADMIN — FENTANYL CITRATE 50 MCG: 50 INJECTION, SOLUTION INTRAMUSCULAR; INTRAVENOUS at 15:21

## 2018-07-11 RX ADMIN — SODIUM CHLORIDE, POTASSIUM CHLORIDE, SODIUM LACTATE AND CALCIUM CHLORIDE 9 ML/HR: 600; 310; 30; 20 INJECTION, SOLUTION INTRAVENOUS at 10:49

## 2018-07-11 RX ADMIN — ROCURONIUM BROMIDE 10 MG: 10 SOLUTION INTRAVENOUS at 13:22

## 2018-07-11 RX ADMIN — FAMOTIDINE 20 MG: 20 TABLET, FILM COATED ORAL at 10:49

## 2018-07-11 RX ADMIN — PROPOFOL 100 MG: 10 INJECTION, EMULSION INTRAVENOUS at 13:23

## 2018-07-11 RX ADMIN — FENTANYL CITRATE 50 MCG: 50 INJECTION, SOLUTION INTRAMUSCULAR; INTRAVENOUS at 15:01

## 2018-07-11 RX ADMIN — PROMETHAZINE HYDROCHLORIDE 12.5 MG: 25 INJECTION INTRAMUSCULAR; INTRAVENOUS at 14:09

## 2018-07-11 RX ADMIN — LIDOCAINE HYDROCHLORIDE 1 EACH: 40 SOLUTION TOPICAL at 13:11

## 2018-07-11 RX ADMIN — ONDANSETRON 4 MG: 2 INJECTION INTRAMUSCULAR; INTRAVENOUS at 13:44

## 2018-07-11 RX ADMIN — ROCURONIUM BROMIDE 25 MG: 10 SOLUTION INTRAVENOUS at 13:09

## 2018-07-11 RX ADMIN — FENTANYL CITRATE 50 MCG: 50 INJECTION, SOLUTION INTRAMUSCULAR; INTRAVENOUS at 13:09

## 2018-07-11 RX ADMIN — GLYCOPYRROLATE 0.4 MG: 0.2 INJECTION, SOLUTION INTRAMUSCULAR; INTRAVENOUS at 13:49

## 2018-07-11 RX ADMIN — LIDOCAINE HYDROCHLORIDE 0.3 ML: 10 INJECTION, SOLUTION EPIDURAL; INFILTRATION; INTRACAUDAL; PERINEURAL at 10:49

## 2018-07-11 RX ADMIN — DEXAMETHASONE SODIUM PHOSPHATE 8 MG: 4 INJECTION, SOLUTION INTRAMUSCULAR; INTRAVENOUS at 13:44

## 2018-07-11 RX ADMIN — ONDANSETRON 4 MG: 2 INJECTION, SOLUTION INTRAMUSCULAR; INTRAVENOUS at 14:18

## 2018-07-11 RX ADMIN — FENTANYL CITRATE 100 MCG: 50 INJECTION, SOLUTION INTRAMUSCULAR; INTRAVENOUS at 14:05

## 2018-07-11 RX ADMIN — FENTANYL CITRATE 50 MCG: 50 INJECTION, SOLUTION INTRAMUSCULAR; INTRAVENOUS at 14:12

## 2018-07-11 NOTE — ANESTHESIA POSTPROCEDURE EVALUATION
Patient: Haylee Hayes    Procedure Summary     Date:  07/11/18 Room / Location:   [facility] [address] [place] [address] [facility]    Anesthesia Start:  1303 Anesthesia Stop:  1408    Procedure:  CHOLECYSTECTOMY LAPAROSCOPIC (N/A Abdomen) Diagnosis:      Surgeon:  Edmund BEE MD Provider:  Ben Santacruz MD    Anesthesia Type:  general ASA Status:  3          Anesthesia Type: general  Last vitals  BP   99/65 (07/11/18 1038)   Temp   97.9 °F (36.6 °C) (07/11/18 1038)   Pulse   63 (07/11/18 1038)   Resp   18 (07/11/18 1038)     SpO2   95 % (07/11/18 1038)     Post Anesthesia Care and Evaluation    Patient location during evaluation: PACU  Patient participation: complete - patient participated  Level of consciousness: awake and alert  Pain score: 0  Pain management: adequate  Airway patency: patent  Anesthetic complications: No anesthetic complications  PONV Status: none  Cardiovascular status: hemodynamically stable and acceptable  Respiratory status: nonlabored ventilation, acceptable and nasal cannula  Hydration status: acceptable

## 2018-07-11 NOTE — OP NOTE
CHOLECYSTECTOMY LAPAROSCOPIC POSSIBLE OPEN CHOLECYSTECTOMY  Procedure Note    Haylee Hayes  Date of Surgery:  7/11/2018    Pre-op Diagnosis:   Cholelithiasis    Post-op Diagnosis:     Same    Operative Procedure:   Laparoscopic cholecystectomy    Indications:  This is Abigail is a 48-year-old female with symptomatic cholelithiasis    Operative note:  On 7/11/18, the patient was taken to the operating room and placed supine on the operating table.  Following adequate induction of general endotracheal anesthesia, the abdomen was prepped and draped in the standard fashion.  A curvilinear infraumbilical incision was made with an 11 blade.  The rectus fascia was identified in the infraumbilical midline and incised with an 11 blade.  This did result in a little more bleeding than is normal.  I never clearly identified a vessel so i continued, this appeared to stop spontaneously.  The posterior fascia and peritoneum were elevated into the wound between hemostats and opened with Metzenbaum scissors.  This allowed free entrance into the abdomen.  A blunt 11 mm trocar was placed into the abdomen and CO2 gas insufflated under normal flows and pressures.  0° angled 10 mm camera was placed into the abdomen and brief inspection room revealed adhesions 20 omentum and the abdominal wall in the right upper quadrant.  The subxiphoid space was clear and a trocar was placed there under direct vision.  The adhesions were then taken down bluntly to expose the right upper quadrant.  No bowel was identified in this area.  The remaining trochars were placed in their usual position under direct vision.  Adhesions to the gallbladder were taken down to expose the infundibulum.  At the infundibulum, the cystic duct and cystic artery were identified in their usual anatomic locations.  The junction of the gallbladder and cystic duct was thoroughly visualized.  There was no evidence of aberrant ductal anatomy and the dissection appeared to be  well away from the wall of the common bile duct.  The cystic duct and cystic artery were divided between clips and the gallbladder taken out in an antegrade fashion with cautery.  The gallbladder was delivered through the umbilical wound and passed off the table.  Prior to closing the fascia at the umbilicus i checked to make sure there was no bleeding.  I saw none.  The fascia at the umbilicus was then closed with 0 Vicryl.  The right upper quadrant was reinspected.  Hemostasis was excellent.  All fluid was suctioned.  The remaining trochars were removed under direct vision as gas was evacuated from the abdomen.  The puncture wounds were closed with subcuticular 4-0 Vicryl's and sterile dressings applied.  The patient tolerated the procedure well and there were no complications.  Estimated blood loss is 20-25 mL's.  Both preoperative and postoperative sponge and needle counts were correct.  The patient was taken to the recovery room in satisfactory condition.    Edmund Singh MD     Date: 7/11/2018  Time: 2:44 PM

## 2018-07-11 NOTE — BRIEF OP NOTE
CHOLECYSTECTOMY LAPAROSCOPIC POSSIBLE OPEN CHOLECYSTECTOMY  Progress Note    Haylee Hayes  7/11/2018    Pre-op Diagnosis:   cholelithiasis       Post-Op Diagnosis Codes:   same    Procedure/CPT® Codes:      Procedure(s):  CHOLECYSTECTOMY LAPAROSCOPIC    Surgeon(s):  Edmund BEE MD    Anesthesia: General    Staff:   Circulator: Isabell Martin RN; Bernadette Pereira RN  Scrub Person: Jud Rhodes  Nursing Assistant: Bryan Tellez    Estimated Blood Loss: 20ml    Urine Voided: * No values recorded between 7/11/2018  1:03 PM and 7/11/2018  2:01 PM *    Specimens:                ID Type Source Tests Collected by Time   A :  Tissue Gallbladder TISSUE PATHOLOGY EXAM Edmund BEE MD 7/11/2018 1330         Drains:      Findings: a few adhesions;some bleeding in rectus muscle that appeared to stop spontaneously    Complications: none      Edmund Singh MD     Date: 7/11/2018  Time: 2:31 PM

## 2018-07-11 NOTE — INTERVAL H&P NOTE
"H&P reviewed. The patient was examined and there are no changes to the H&P.   She has had a couple more episodes of right upper quadrant abdominal pain, she has not gone to ER but gave it consideration for one episode.  She has started having some intermittent left sided pain, too,  BP 99/65 (BP Location: Right arm, Patient Position: Lying)   Pulse 63   Temp 97.9 °F (36.6 °C) (Temporal Artery )   Resp 18   Ht 165.1 cm (65\")   Wt 122 kg (270 lb)   SpO2 95%   BMI 44.93 kg/m²   Lungs clear   Heart regular rhythm without murmur  Abdomen soft, with some tenderness under rib cage on both sides  Immunizations   pneumo neg   Flu 2018  Tetanus 5 years  ETOH neg  Tobacco quit 6 months  Allergy  Coumadin [warfarin sodium] and Dilaudid [hydromorphone hcl]  No allergy to latex or contrast dye  Angela Harley PA-C  7/11/18  12:25  "

## 2018-07-11 NOTE — ANESTHESIA PREPROCEDURE EVALUATION
Anesthesia Evaluation                  Airway   Mallampati: II  Dental      Pulmonary    Cardiovascular         Neuro/Psych  GI/Hepatic/Renal/Endo    (+) morbid obesity, PUD,      Musculoskeletal     Abdominal    Substance History      OB/GYN          Other                        Anesthesia Plan    ASA 3     general     intravenous induction   Anesthetic plan and risks discussed with patient.

## 2018-07-11 NOTE — ANESTHESIA PROCEDURE NOTES
Airway  Urgency: elective    Date/Time: 7/11/2018 1:09 PM  End Time:7/11/2018 1:11 PM  Airway not difficult    General Information and Staff    Patient location during procedure: OR  CRNA: IVANNA HU    Indications and Patient Condition  Indications for airway management: airway protection    Preoxygenated: yes  MILS not maintained throughout  Mask difficulty assessment: 1 - vent by mask    Final Airway Details  Final airway type: endotracheal airway      Successful airway: ETT  Cuffed: yes   Successful intubation technique: direct laryngoscopy  Endotracheal tube insertion site: oral  Blade: Florencio  Blade size: #3  ETT size: 7.0 mm  Cormack-Lehane Classification: grade I - full view of glottis  Placement verified by: chest auscultation and capnometry   Measured from: lips  ETT to lips (cm): 21  Number of attempts at approach: 1    Additional Comments  Negative epigastric sounds, Breath sound equal bilaterally with symmetric chest rise and fall

## 2018-07-12 LAB
CYTO UR: NORMAL
LAB AP CASE REPORT: NORMAL
LAB AP CLINICAL INFORMATION: NORMAL
PATH REPORT.FINAL DX SPEC: NORMAL
PATH REPORT.GROSS SPEC: NORMAL

## 2018-10-08 ENCOUNTER — TELEPHONE (OUTPATIENT)
Dept: SLEEP MEDICINE | Facility: HOSPITAL | Age: 48
End: 2018-10-08

## 2018-10-08 DIAGNOSIS — R06.83 SNORING: ICD-10-CM

## 2018-10-08 DIAGNOSIS — G47.33 OSA (OBSTRUCTIVE SLEEP APNEA): Primary | ICD-10-CM

## 2018-10-08 NOTE — TELEPHONE ENCOUNTER
Left message for her to return my call.  The reason for the call is that Dr. Mera wants her to have an in-lab sleep study as her home sleep test was non-diagnostic for sleep-disordered breathing but he still suspects the HST equipment may be missing it and wants to bring her in for an in-lab study.      If she calls back and is agreeable to scheduling study then please schedule as regular split-night study.  If she wishes to follow up with Dr Mera first that is also an option (though she did not keep her appointment the other day that was the original follow up appointment).

## 2018-12-05 ENCOUNTER — OFFICE VISIT (OUTPATIENT)
Dept: FAMILY MEDICINE CLINIC | Facility: CLINIC | Age: 48
End: 2018-12-05

## 2018-12-05 VITALS
OXYGEN SATURATION: 96 % | TEMPERATURE: 97.8 F | RESPIRATION RATE: 16 BRPM | HEIGHT: 65 IN | HEART RATE: 73 BPM | WEIGHT: 270 LBS | BODY MASS INDEX: 44.98 KG/M2

## 2018-12-05 DIAGNOSIS — Z86.19 HISTORY OF HERPES ZOSTER: ICD-10-CM

## 2018-12-05 DIAGNOSIS — B34.9 VIRAL SYNDROME: ICD-10-CM

## 2018-12-05 DIAGNOSIS — R20.8 DYSESTHESIA: Primary | ICD-10-CM

## 2018-12-05 PROCEDURE — 99214 OFFICE O/P EST MOD 30 MIN: CPT | Performed by: PHYSICIAN ASSISTANT

## 2018-12-05 RX ORDER — METHYLPREDNISOLONE 4 MG/1
TABLET ORAL
Qty: 21 TABLET | Refills: 0 | Status: SHIPPED | OUTPATIENT
Start: 2018-12-05 | End: 2019-03-08

## 2018-12-05 RX ORDER — FAMCICLOVIR 500 MG/1
500 TABLET ORAL 3 TIMES DAILY
Qty: 21 TABLET | Refills: 0 | Status: SHIPPED | OUTPATIENT
Start: 2018-12-05 | End: 2019-03-08

## 2018-12-05 NOTE — PROGRESS NOTES
"    Chief Complaint   Patient presents with   • Herpes Zoster     hx of shingles since age 22 / hx of vaginal herpes    • Headache   • Skin Sensitivity       HPI     Haylee Hayes is a pleasant 48 y.o. female who presents for evaluation of \"chief complaint.\" Patient reports flu-like symptoms, fatigue, myalgias, and headache behind her eyes beginning last week. A skin sensitivity that \"feels like a sunburn\" on her left abdomen and leg began 2 days ago. This is worsened by contact with her clothes. She has had around 10 similar episodes since a remote diagnosis of shingles. Her current discomfort is in the same area as her zoster. Last episode was 3 years ago. She has at one time been on viral suppressive treatment. She currently takes gabapentin 300 mg bid. Had a few leftover pain pills that helped. Pain rated 5/10 presently.     Past Medical History:   Diagnosis Date   • Acute respiratory failure with hypoxia (CMS/HCC) 2018   • Chest pain 2018   • Chronic pain 2018   • Depression    • DVT (deep venous thrombosis) (CMS/HCC)     left arm,  андрей   • Kidney stone     around early , has had some prior to that as well, passed all of them without surgery   • Obesity 2018   • Peptic ulceration    • Pneumonia     never had to be hospitalized for it       Past Surgical History:   Procedure Laterality Date   • APPENDECTOMY      age 15, they removed appendix while doing oopherectomy   •  SECTION     • GASTRECTOMY      [facility]   • RIGHT OOPHORECTOMY      torque   • TONSILLECTOMY      age 17       Family History   Problem Relation Age of Onset   • Atrial fibrillation Mother    • Heart failure Mother    • Diabetes Father    • Stroke Father    • No Known Problems Son    • Coronary artery disease Maternal Grandmother    • Valvular heart disease Maternal Grandmother    • Tuberculosis Maternal Grandfather    • Tuberculosis Paternal Grandfather    • Breast cancer Neg Hx    • Ovarian " cancer Neg Hx    • Cancer Neg Hx        Social History     Socioeconomic History   • Marital status:      Spouse name: Not on file   • Number of children: Not on file   • Years of education: Not on file   • Highest education level: Not on file   Social Needs   • Financial resource strain: Not on file   • Food insecurity - worry: Not on file   • Food insecurity - inability: Not on file   • Transportation needs - medical: Not on file   • Transportation needs - non-medical: Not on file   Occupational History   • Not on file   Tobacco Use   • Smoking status: Former Smoker     Packs/day: 0.25     Types: Cigarettes   • Smokeless tobacco: Never Used   • Tobacco comment: About a third a pack a day, quit 2/2018   Substance and Sexual Activity   • Alcohol use: Yes     Comment: about once a month 1-2 drinks at a dinner, no hx heavy use or abuse other than more social in college years   • Drug use: No   • Sexual activity: Defer   Other Topics Concern   • Not on file   Social History Narrative    Mrs. Hayes is a 48 year old white  female. She lives in [facility]. They have two sons. 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 She does not have an advanced directive.       Allergies   Allergen Reactions   • Coumadin [Warfarin Sodium] Anaphylaxis     Swelling of throat. LUE DVT in 2005 when inpatient for unknown cause, did fine on heparin gtt, home on coumadin and anaphylaxis, completed and tolerated 6 months lovenox.   • Dilaudid [Hydromorphone Hcl] Other (See Comments)     Oxygen, blood pressure drops that takes several days to resolve, affects loc       ROS    Review of Systems   Constitutional: Negative for chills and fever.   HENT: Negative for congestion.    Respiratory: Negative for cough, shortness of breath and wheezing.    Gastrointestinal: Negative for abdominal pain, diarrhea and vomiting.   Musculoskeletal: Positive for myalgias.   Skin: Negative  for rash.   Neurological: Positive for headache. Negative for dizziness and light-headedness.       Vitals:    12/05/18 1528   Pulse: 73   Resp: 16   Temp: 97.8 °F (36.6 °C)   SpO2: 96%         Current Outpatient Medications:   •  buPROPion XL (WELLBUTRIN XL) 300 MG 24 hr tablet, Take 1 tablet by mouth Daily., Disp: 90 tablet, Rfl: 3  •  citalopram (CeleXA) 40 MG tablet, Take 1 tablet by mouth Daily., Disp: 90 tablet, Rfl: 3  •  ferrous sulfate 325 (65 FE) MG tablet, Take 1 tablet by mouth Daily With Breakfast., Disp: 90 tablet, Rfl: 0  •  gabapentin (NEURONTIN) 600 MG tablet, Take 600 mg by mouth 2 (Two) Times a Day., Disp: , Rfl:   •  pantoprazole (PROTONIX) 40 MG EC tablet, Take 1 tablet by mouth 2 (Two) Times a Day Before Meals., Disp: 180 tablet, Rfl: 0  •  temazepam (RESTORIL) 15 MG capsule, Take 15 mg by mouth As Needed for Sleep., Disp: , Rfl:   •  famciclovir (FAMVIR) 500 MG tablet, Take 1 tablet by mouth 3 (Three) Times a Day., Disp: 21 tablet, Rfl: 0  •  HYDROcodone-acetaminophen (NORCO) 5-325 MG per tablet, Take 1 tablet by mouth Every 8 (Eight) Hours As Needed for Moderate Pain ., Disp: 30 tablet, Rfl: 0  •  MethylPREDNISolone (MEDROL, FLASH,) 4 MG tablet, Take as directed on package instructions., Disp: 21 tablet, Rfl: 0  •  ondansetron (ZOFRAN) 4 MG tablet, Take 1 tablet by mouth Every 8 (Eight) Hours As Needed for Nausea or Vomiting., Disp: 30 tablet, Rfl: 1    PE    Physical Exam   Constitutional: She appears well-developed and well-nourished.  Non-toxic appearance. No distress.   HENT:   Head: Normocephalic.   Eyes: EOM are normal. Pupils are equal, round, and reactive to light.   Cardiovascular: Normal rate, regular rhythm and normal heart sounds.   No murmur heard.  Pulmonary/Chest: Effort normal and breath sounds normal. She has no wheezes. She has no rales.   Abdominal:       Neurological: She is alert. She has normal strength. No sensory deficit.   Reflex Scores:       Patellar reflexes are 1+  on the right side and 1+ on the left side.       Achilles reflexes are 1+ on the right side and 1+ on the left side.  CN II-XII grossly intact/symmetric   Psychiatric: She has a normal mood and affect. Her behavior is normal.   Vitals reviewed.       A/P    Problem List Items Addressed This Visit        Other    History of herpes zoster      Other Visit Diagnoses     Dysesthesia    -  Primary  -?zoster prodrome triggered by viral illness.   -Tx famvir x 1 week, discussed titrating gabapentin to 600 mg bid for pain control (patient currently has this medication at home).   -F/u with her PCP next week for recheck  -Patient also mentions needing refill of lorazepam which I recommended she address with her PCP on f/u.       Viral syndrome        Relevant Medications    famciclovir (FAMVIR) 500 MG tablet          Plan of care reviewed with patient at the conclusion of today's visit. Education was provided regarding diagnosis, management and any prescribed or recommended OTC medications.  Patient verbalizes understanding of and agreement with management plan.        LEROY Hwang

## 2018-12-06 ENCOUNTER — TELEPHONE (OUTPATIENT)
Dept: FAMILY MEDICINE CLINIC | Facility: CLINIC | Age: 48
End: 2018-12-06

## 2018-12-06 NOTE — TELEPHONE ENCOUNTER
Pt seen Dayron yesterday, but he isn't here today and pt typically sees you. Unsure what you would like to do. Please advise.

## 2018-12-06 NOTE — TELEPHONE ENCOUNTER
Tell patient to increase gabapentin to 300 mg TID.  Please send to arianna and have him review tomorrow and have him determine if he feels additional pain medication is warranted.

## 2018-12-07 ENCOUNTER — TELEPHONE (OUTPATIENT)
Dept: FAMILY MEDICINE CLINIC | Facility: CLINIC | Age: 48
End: 2018-12-07

## 2018-12-07 NOTE — TELEPHONE ENCOUNTER
JEOVANY for patient.  She would need appointment here for further evaluation prior to considering a narcotic prescription.    I think she would benefit from increasing gabapentin as her symptoms are neuropathic.

## 2018-12-07 NOTE — TELEPHONE ENCOUNTER
Called patient to discuss current shingles/pain syndrome.  Have not seen patient here in awhile and would want her to schedule appointment with me to be evaluated and discuss further.    Recommend in the interim that she increase gabapentin as this should work best for the neuropathic pain that shingles causes.

## 2019-03-07 ENCOUNTER — TELEPHONE (OUTPATIENT)
Dept: FAMILY MEDICINE CLINIC | Facility: CLINIC | Age: 49
End: 2019-03-07

## 2019-03-08 ENCOUNTER — OFFICE VISIT (OUTPATIENT)
Dept: FAMILY MEDICINE CLINIC | Facility: CLINIC | Age: 49
End: 2019-03-08

## 2019-03-08 ENCOUNTER — TELEPHONE (OUTPATIENT)
Dept: FAMILY MEDICINE CLINIC | Facility: CLINIC | Age: 49
End: 2019-03-08

## 2019-03-08 VITALS
DIASTOLIC BLOOD PRESSURE: 66 MMHG | HEART RATE: 68 BPM | HEIGHT: 65 IN | BODY MASS INDEX: 41.62 KG/M2 | SYSTOLIC BLOOD PRESSURE: 108 MMHG | OXYGEN SATURATION: 99 % | WEIGHT: 249.8 LBS

## 2019-03-08 DIAGNOSIS — M54.42 CHRONIC MIDLINE LOW BACK PAIN WITH LEFT-SIDED SCIATICA: ICD-10-CM

## 2019-03-08 DIAGNOSIS — M25.552 LEFT HIP PAIN: Primary | ICD-10-CM

## 2019-03-08 DIAGNOSIS — G47.00 INSOMNIA, UNSPECIFIED TYPE: ICD-10-CM

## 2019-03-08 DIAGNOSIS — F41.8 DEPRESSION WITH ANXIETY: ICD-10-CM

## 2019-03-08 DIAGNOSIS — G89.29 CHRONIC MIDLINE LOW BACK PAIN WITH LEFT-SIDED SCIATICA: ICD-10-CM

## 2019-03-08 DIAGNOSIS — G89.29 OTHER CHRONIC PAIN: Primary | Chronic | ICD-10-CM

## 2019-03-08 DIAGNOSIS — K25.9 GASTRIC ULCER WITHOUT HEMORRHAGE OR PERFORATION, UNSPECIFIED CHRONICITY: ICD-10-CM

## 2019-03-08 PROCEDURE — 99213 OFFICE O/P EST LOW 20 MIN: CPT | Performed by: PHYSICIAN ASSISTANT

## 2019-03-08 PROCEDURE — 20610 DRAIN/INJ JOINT/BURSA W/O US: CPT | Performed by: PHYSICIAN ASSISTANT

## 2019-03-08 RX ORDER — CITALOPRAM 40 MG/1
40 TABLET ORAL DAILY
Qty: 90 TABLET | Refills: 1 | Status: SHIPPED | OUTPATIENT
Start: 2019-03-08 | End: 2020-01-27 | Stop reason: SDUPTHER

## 2019-03-08 RX ORDER — TEMAZEPAM 15 MG/1
15 CAPSULE ORAL AS NEEDED
Qty: 30 CAPSULE | Refills: 0 | Status: CANCELLED | OUTPATIENT
Start: 2019-03-08

## 2019-03-08 RX ORDER — BUPROPION HYDROCHLORIDE 300 MG/1
300 TABLET ORAL DAILY
Qty: 90 TABLET | Refills: 1 | Status: SHIPPED | OUTPATIENT
Start: 2019-03-08 | End: 2019-05-02 | Stop reason: SDUPTHER

## 2019-03-08 RX ORDER — GABAPENTIN 600 MG/1
600 TABLET ORAL 2 TIMES DAILY
Qty: 60 TABLET | Refills: 0 | Status: CANCELLED | OUTPATIENT
Start: 2019-03-08

## 2019-03-08 RX ORDER — PANTOPRAZOLE SODIUM 40 MG/1
40 TABLET, DELAYED RELEASE ORAL
Qty: 180 TABLET | Refills: 1 | Status: SHIPPED | OUTPATIENT
Start: 2019-03-08 | End: 2021-02-01

## 2019-03-08 RX ORDER — TRIAMCINOLONE ACETONIDE 40 MG/ML
40 INJECTION, SUSPENSION INTRA-ARTICULAR; INTRAMUSCULAR ONCE
Status: COMPLETED | OUTPATIENT
Start: 2019-03-08 | End: 2019-03-08

## 2019-03-08 RX ADMIN — TRIAMCINOLONE ACETONIDE 40 MG: 40 INJECTION, SUSPENSION INTRA-ARTICULAR; INTRAMUSCULAR at 11:11

## 2019-03-08 NOTE — TELEPHONE ENCOUNTER
Burpropion 300mg.  1 x daily    Citalopram 40mg  1 x daily     Gabapentin 600 mg  2 x daily      Pantoprazole 40mg  2 x daily    Temazepam 15mg   1 x nightly as needed    [facility]

## 2019-03-08 NOTE — TELEPHONE ENCOUNTER
Wasn't discussed today.  Would need patient to return for appointment to discuss and get appropriate paperwork on file.  Recommend returning in 1-2 weeks and we can see how her left hip is doing as well.  Does patient have enough medication until then?

## 2019-03-08 NOTE — TELEPHONE ENCOUNTER
I don't see where our office has ever prescribed Gabapentin or Temazepam for this patient. Did you discuss this with her at [facility] today? Please advise.

## 2019-03-08 NOTE — TELEPHONE ENCOUNTER
[facility]     Bupropion 300 mg tablets #90, 1 RF  Citalopram 40 mg #90, 1 RF   Pantoprazole 40 mg #180, 1 RF     To [facility] [address]

## 2019-03-08 NOTE — PROGRESS NOTES
Chief Complaint   Patient presents with   • Hip Pain     left hip pain, been going on for about 5 months. Has been getting worse, unable to sleep on that side anymore. Pain radiates down from hip to lower leg       HPI      Haylee Hayes is a 48 y.o. female who presents for Hip Pain (left hip pain, been going on for about 5 months. Has been getting worse, unable to sleep on that side anymore. Pain radiates down from hip to lower leg)    Patient reports acute on chronic left hip pain.  Symptoms initially started a few years ago and an injection over a year ago into the left trochanteric bursa relieved symptoms.  Similar symptoms have returned with additional radiculopathy.  Patient reports significant pain along left lateral aspect of hip with radiation anteriorly into knee and extending into shin.  She has chronic back/SI joint issues but denies any back pain today.  She has not had any imaging within the last few years.  Mild pain radiating into groin but majority of pain occurs along left trochanteric bursa.  Pain is worse when lying or sitting for long periods of time and then she goes to change position.  Unable to tolerate lying on left hip.  No allergy to steroids or lidocaine.    Past Medical History:   Diagnosis Date   • Acute respiratory failure with hypoxia (CMS/HCC) 2018   • Chest pain 2018   • Chronic pain 2018   • Depression    • DVT (deep venous thrombosis) (CMS/HCC)     left arm,  андрей   • Kidney stone     around early , has had some prior to that as well, passed all of them without surgery   • Obesity 2018   • Peptic ulceration    • Pneumonia     never had to be hospitalized for it       Past Surgical History:   Procedure Laterality Date   • APPENDECTOMY      age 15, they removed appendix while doing oopherectomy   •  SECTION     • CHOLECYSTECTOMY N/A 2018    Procedure: CHOLECYSTECTOMY LAPAROSCOPIC;  Surgeon: Edmund BEE MD;  Location: [facility];  Service:  General   • ENDOSCOPY N/A 6/3/2018    Procedure: ESOPHAGOGASTRODUODENOSCOPY;  Surgeon: Mark I Brunner, MD;  Location: [facility] ENDOSCOPY;  Service: Gastroenterology   • GASTRECTOMY      [facility]   • RIGHT OOPHORECTOMY      torque   • TONSILLECTOMY      age 17       Family History   Problem Relation Age of Onset   • Atrial fibrillation Mother    • Heart failure Mother    • Diabetes Father    • Stroke Father    • No Known Problems Son    • Coronary artery disease Maternal Grandmother    • Valvular heart disease Maternal Grandmother    • Tuberculosis Maternal Grandfather    • Tuberculosis Paternal Grandfather    • Breast cancer Neg Hx    • Ovarian cancer Neg Hx    • Cancer Neg Hx        Social History     Socioeconomic History   • Marital status:      Spouse name: Not on file   • Number of children: Not on file   • Years of education: Not on file   • Highest education level: Not on file   Social Needs   • Financial resource strain: Not on file   • Food insecurity - worry: Not on file   • Food insecurity - inability: Not on file   • Transportation needs - medical: Not on file   • Transportation needs - non-medical: Not on file   Occupational History   • Not on file   Tobacco Use   • Smoking status: Former Smoker     Packs/day: 0.25     Types: Cigarettes   • Smokeless tobacco: Never Used   • Tobacco comment: About a third a pack a day, quit 2/2018   Substance and Sexual Activity   • Alcohol use: Yes     Comment: about once a month 1-2 drinks at a dinner, no hx heavy use or abuse other than more social in college years   • Drug use: No   • Sexual activity: Defer   Other Topics Concern   • Not on file   Social History Narrative    Mrs. Hayes is a 48 year old white  female. She lives in [facility] [place]. They have two sons. 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 She does not have an advanced directive.       Allergies   Allergen Reactions   •  "Coumadin [Warfarin Sodium] Anaphylaxis     Swelling of throat. TAWNY DVT in 2005 when inpatient for unknown cause, did fine on heparin gtt, home on coumadin and anaphylaxis, completed and tolerated 6 months lovenox.   • Dilaudid [Hydromorphone Hcl] Other (See Comments)     Oxygen, blood pressure drops that takes several days to resolve, affects loc       ROS    Review of Systems   Constitutional: Negative for chills, diaphoresis, fatigue and fever.   Musculoskeletal: Positive for arthralgias, back pain, gait problem, myalgias and bursitis.   Psychiatric/Behavioral: Positive for stress. Negative for dysphoric mood, sleep disturbance and suicidal ideas. The patient is not nervous/anxious.        Vitals:    03/08/19 0743   BP: 108/66   BP Location: Right arm   Patient Position: Sitting   Cuff Size: Large Adult   Pulse: 68   SpO2: 99%   Weight: 113 kg (249 lb 12.8 oz)   Height: 165.1 cm (65\")         Current Outpatient Medications:   •  gabapentin (NEURONTIN) 600 MG tablet, Take 600 mg by mouth 2 (Two) Times a Day., Disp: , Rfl:   •  temazepam (RESTORIL) 15 MG capsule, Take 15 mg by mouth As Needed for Sleep., Disp: , Rfl:   •  buPROPion XL (WELLBUTRIN XL) 300 MG 24 hr tablet, Take 1 tablet by mouth Daily., Disp: 90 tablet, Rfl: 1  •  citalopram (CeleXA) 40 MG tablet, Take 1 tablet by mouth Daily., Disp: 90 tablet, Rfl: 1  •  pantoprazole (PROTONIX) 40 MG EC tablet, Take 1 tablet by mouth 2 (Two) Times a Day Before Meals., Disp: 180 tablet, Rfl: 1    Current Facility-Administered Medications:   •  triamcinolone acetonide (KENALOG-40) injection 40 mg, 40 mg, Intramuscular, Once, Jennyfer Mauricio PA-C      PE    Physical Exam   Constitutional: She appears well-developed and well-nourished. No distress.   HENT:   Head: Normocephalic and atraumatic.   Eyes: EOM are normal.   Neck: Normal range of motion.   Cardiovascular: Normal rate, regular rhythm and normal heart sounds.   Pulmonary/Chest: Effort normal and breath " sounds normal.   Musculoskeletal: Normal range of motion. She exhibits no edema.        Left hip: She exhibits bony tenderness. She exhibits no swelling.        Legs:  Neurological: She is alert.   Skin: Skin is warm. She is not diaphoretic. No erythema.   Psychiatric: She has a normal mood and affect. Her speech is normal and behavior is normal. Judgment and thought content normal. She is not actively hallucinating. She is attentive.      Inject Trigger Point, 1 or 2  Date/Time: 3/8/2019 10:43 AM  Performed by: Jennyfer Mauricio PA-C  Authorized by: Jennyfer Mauricio PA-C   Local anesthesia used: no    Anesthesia:  Local anesthesia used: no    Sedation:  Patient sedated: no    Patient tolerance: Patient tolerated the procedure well with no immediate complications  Comments: Injection of kenalog and lidocaine into left trochanteric bursa.  Area prepped with betadine.  Patient tolerated procedure well.  Felt immediate relief.        A/P    Haylee was seen today for hip pain.    Diagnoses and all orders for this visit:    Left hip pain  -with pain focal along left trochanteric bursa  -will trial injection today as patient has had relief in past with this  -will order xray of left hip given mild pain into groin  -may need referral to ortho depending upon patient's response to injection and xray  -     triamcinolone acetonide (KENALOG-40) injection 40 mg; Inject 1 mL into the appropriate muscle as directed by prescriber 1 (One) Time.  -     XR Hip With or Without Pelvis 2 - 3 View Left; Future  -     Inject Trigger Point, 1 or 2    Chronic midline low back pain with left-sided sciatica  -pain into left leg follows L3-4 dermatome  -discussed that pain may be combination of trochanteric bursitis and lumbar radiculopathy  -will order xrays as initial imaging, would need MRI if pain persists  -     XR Spine Lumbar 4+ View; Future             Plan of care reviewed with patient at the conclusion of today's  visit. Education was provided regarding diagnosis, management and any prescribed or recommended OTC medications.  Patient verbalizes understanding of and agreement with management plan.    No Follow-up on file.     Jennyfer Mauricio PA-C

## 2019-05-02 ENCOUNTER — PRIOR AUTHORIZATION (OUTPATIENT)
Dept: FAMILY MEDICINE CLINIC | Facility: CLINIC | Age: 49
End: 2019-05-02

## 2019-05-02 ENCOUNTER — OFFICE VISIT (OUTPATIENT)
Dept: FAMILY MEDICINE CLINIC | Facility: CLINIC | Age: 49
End: 2019-05-02

## 2019-05-02 VITALS
HEIGHT: 65 IN | OXYGEN SATURATION: 98 % | SYSTOLIC BLOOD PRESSURE: 108 MMHG | HEART RATE: 67 BPM | WEIGHT: 251.2 LBS | DIASTOLIC BLOOD PRESSURE: 70 MMHG | BODY MASS INDEX: 41.85 KG/M2

## 2019-05-02 DIAGNOSIS — F41.8 DEPRESSION WITH ANXIETY: ICD-10-CM

## 2019-05-02 DIAGNOSIS — M79.605 LEFT LEG PAIN: ICD-10-CM

## 2019-05-02 DIAGNOSIS — G89.29 CHRONIC LEFT HIP PAIN: Primary | ICD-10-CM

## 2019-05-02 DIAGNOSIS — M25.552 CHRONIC LEFT HIP PAIN: Primary | ICD-10-CM

## 2019-05-02 PROCEDURE — 99214 OFFICE O/P EST MOD 30 MIN: CPT | Performed by: PHYSICIAN ASSISTANT

## 2019-05-02 RX ORDER — DULOXETIN HYDROCHLORIDE 30 MG/1
CAPSULE, DELAYED RELEASE ORAL
Qty: 60 CAPSULE | Refills: 1 | Status: SHIPPED | OUTPATIENT
Start: 2019-05-02 | End: 2019-06-18

## 2019-05-02 RX ORDER — HYDROXYZINE HYDROCHLORIDE 25 MG/1
25 TABLET, FILM COATED ORAL 3 TIMES DAILY PRN
Qty: 30 TABLET | Refills: 0 | Status: SHIPPED | OUTPATIENT
Start: 2019-05-02 | End: 2019-05-14

## 2019-05-02 RX ORDER — BUPROPION HYDROCHLORIDE 150 MG/1
300 TABLET ORAL DAILY
Qty: 14 TABLET | Refills: 0 | Status: SHIPPED | OUTPATIENT
Start: 2019-05-02 | End: 2020-01-27 | Stop reason: SDUPTHER

## 2019-05-02 NOTE — TELEPHONE ENCOUNTER
PA for Diclofenac Gel approved via Cover My [employer]    Key: [id number]      Contacted pharmacy and informed of approval

## 2019-05-02 NOTE — PATIENT INSTRUCTIONS
Decrease wellbutrin to 150 mg daily x14 days, simultaneously start cymbalta 30 mg daily for 14 days.  Discontinue wellbutrin on day 15 and start cymbalta 60 mg daily (2 tablets of 30 mg)

## 2019-05-03 NOTE — PROGRESS NOTES
Chief Complaint   Patient presents with   • Hip Pain     left hip pain, pain radiating down into left leg.        HPI      Haylee Hayes is a 48 y.o. female who presents for Hip Pain (left hip pain, pain radiating down into left leg. )     Patient presents for ongoing left leg pain.  She was seen about 2 months ago with similar symptoms and had a trochanteric bursitis steroid injection which helped but only temporarily.  The pain is now worse and radiating laterally into her knee and calf.  She has history of chronic lumbar pain.  She reports pain into left groin and difficulty everting her hip.  The pain is limiting her activity and she has significant pain when she lays on her left side, difficulty sleeping at night.  Has not obtained imaging.    Patient has major episode of depression a few years ago requiring hospitalization.  She is on wellbutrin 300 mg daily and celexa 40 mg daily.  She reports decrease in sex drive that is affecting her quality of life.    Past Medical History:   Diagnosis Date   • Acute respiratory failure with hypoxia (CMS/HCC) 2018   • Chest pain 2018   • Chronic pain 2018   • Depression    • DVT (deep venous thrombosis) (CMS/HCC)     left arm,  андрей   • Kidney stone     around early , has had some prior to that as well, passed all of them without surgery   • Obesity 2018   • Peptic ulceration    • Pneumonia     never had to be hospitalized for it       Past Surgical History:   Procedure Laterality Date   • APPENDECTOMY      age 15, they removed appendix while doing oopherectomy   •  SECTION     • CHOLECYSTECTOMY N/A 2018    Procedure: CHOLECYSTECTOMY LAPAROSCOPIC;  Surgeon: Edmund BEE MD;  Location:  [facility];  Service: General   • ENDOSCOPY N/A 6/3/2018    Procedure: ESOPHAGOGASTRODUODENOSCOPY;  Surgeon: Mark I Brunner, MD;  Location:  "[facility] ENDOSCOPY;  Service: Gastroenterology   • GASTRECTOMY      [facility]   • RIGHT  OOPHORECTOMY      torque   • TONSILLECTOMY      age 17       Family History   Problem Relation Age of Onset   • Atrial fibrillation Mother    • Heart failure Mother    • Diabetes Father    • Stroke Father    • No Known Problems Son    • Coronary artery disease Maternal Grandmother    • Valvular heart disease Maternal Grandmother    • Tuberculosis Maternal Grandfather    • Tuberculosis Paternal Grandfather    • Breast cancer Neg Hx    • Ovarian cancer Neg Hx    • Cancer Neg Hx        Social History     Socioeconomic History   • Marital status:      Spouse name: Not on file   • Number of children: Not on file   • Years of education: Not on file   • Highest education level: Not on file   Tobacco Use   • Smoking status: Former Smoker     Packs/day: 0.25     Types: Cigarettes   • Smokeless tobacco: Never Used   • Tobacco comment: About a third a pack a day, quit 2/2018   Substance and Sexual Activity   • Alcohol use: Yes     Comment: about once a month 1-2 drinks at a dinner, no hx heavy use or abuse other than more social in college years   • Drug use: No   • Sexual activity: Defer   Social History Narrative    Mrs. Hayes is a 48 year old white  female. She lives in [facility]. They have two sons. 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 She does not have an advanced directive.       Allergies   Allergen Reactions   • Coumadin [Warfarin Sodium] Anaphylaxis     Swelling of throat. LUE DVT in 2005 when inpatient for unknown cause, did fine on heparin gtt, home on coumadin and anaphylaxis, completed and tolerated 6 months lovenox.   • Dilaudid [Hydromorphone Hcl] Other (See Comments)     Oxygen, blood pressure drops that takes several days to resolve, affects loc       ROS    Review of Systems   Constitutional: Negative for chills, diaphoresis, fatigue and fever.   Musculoskeletal: Positive for arthralgias, back pain and myalgias. Negative for gait  "problem.   Neurological: Negative for dizziness and headache.   Psychiatric/Behavioral: Positive for sleep disturbance (due to pain), depressed mood and stress. Negative for suicidal ideas. The patient is nervous/anxious.        Vitals:    05/02/19 0913   BP: 108/70   BP Location: Right arm   Patient Position: Sitting   Cuff Size: Large Adult   Pulse: 67   SpO2: 98%   Weight: 114 kg (251 lb 3.2 oz)   Height: 165.1 cm (65\")       Current Outpatient Medications on File Prior to Visit   Medication Sig Dispense Refill   • citalopram (CeleXA) 40 MG tablet Take 1 tablet by mouth Daily. 90 tablet 1   • pantoprazole (PROTONIX) 40 MG EC tablet Take 1 tablet by mouth 2 (Two) Times a Day Before Meals. 180 tablet 1   • temazepam (RESTORIL) 15 MG capsule Take 15 mg by mouth As Needed for Sleep.       No current facility-administered medications on file prior to visit.        Results for orders placed or performed during the hospital encounter of 07/11/18   Tissue Pathology Exam   Result Value Ref Range    Case Report       Surgical Pathology Report                         Case: [id number]                                  Authorizing Provider:  Edmund BEE MD          Collected:           07/11/2018 01:30 PM          Ordering Location:     [facility]   Received:            07/11/2018 02:15 PM                                 OR                                                                           Pathologist:           Otoniel Carver MD                                                         Specimen:    Gallbladder                                                                                Clinical Information       The working history is cholelithiasis.        Final Diagnosis       GALLBLADDER, CHOLECYSTECTOMY:  Chronic cholecystitis and cholelithiasis.   Cholesterolosis.  Negative for metaplasia and dysplasia.    [facility] [place]       Gross Description       Received in formalin labeled as \"gallbladder\" is a " "7.5 x 4.5 x 3.0 cm intact, unopened gallbladder with 0.3 cm of attached cystic duct.  The serosa is tan-pink and wrinkled with adherent fat. The lumen contains an abundant amount of charlene viscid bile and a single yellow-brown crystalline cholelith measuring 1 cm in greatest dimension.  The mucosa is red and velvety with diffuse yellow stippling and the wall thickness averages 0.3 cm.  No masses are appreciated.  Representative sections to include the cystic duct margin, neck, body and fundus are submitted in a single cassette.  HBM/mm        Microscopic Description       The slides are reviewed and demonstrate histopathologic features supporting the above rendered diagnosis.            PE    Physical Exam   Constitutional: She is oriented to person, place, and time. Vital signs are normal. She appears well-developed and well-nourished. She is active and cooperative. She does not appear ill. No distress. She is obese.  HENT:   Head: Normocephalic and atraumatic.   Eyes: EOM are normal.   Neck: Normal range of motion.   Musculoskeletal: Normal range of motion. She exhibits no edema.   TTP along trochanteric bursa.  Difficulty with eversion secondary to pain.   Neurological: She is alert and oriented to person, place, and time.   Skin: Skin is warm. She is not diaphoretic. No erythema.   Psychiatric: She has a normal mood and affect. Her speech is normal and behavior is normal. Judgment and thought content normal. She is not actively hallucinating. Cognition and memory are normal. She is attentive.        A/P    Haylee was seen today for hip pain.    Diagnoses and all orders for this visit:    Chronic left hip pain  -with tenderness along trochanteric bursa and radiation laterally into knee and calf, chronic back pain  -discussed that this is most likely due to multiple factors, patient has not obtained xrays yet and will do this  -she states it feels like a \"bone\" pain  -she discontinued her neurontin about 2 months " "ago because it made her feel \"foggy\", wondering if this has contributed to pain  -she had temporary relief with steroid injection but this was short lasting  -discussed obtaining xrays, going to PT and trying topical cream  -depending upon xrays and response to therapy, may need to obtain additional imaging and refer to ortho  -     diclofenac (VOLTAREN) 1 % gel gel; Apply 4 g topically to the appropriate area as directed 4 (Four) Times a Day As Needed (prn for pain).  -     Ambulatory Referral to Physical Therapy Evaluate and treat    Left leg pain  -     XR Femur 2 View Left; Future    Depression with anxiety  -history of hospitalization due to depression  -will trial switching from wellbutrin to cymbalta for addition of pain relief, patient aware to call if she starts to experience depression  -she reports sexual dysfunction with medication that is affecting her quality of life  -discuss that this is most likely related to celexa and she has no interest in discontinuing this right now  -     buPROPion XL (WELLBUTRIN XL) 150 MG 24 hr tablet; Take 2 tablets by mouth Daily.  -     DULoxetine (CYMBALTA) 30 MG capsule; Take 1 tablet daily for 14 days, then increase to 2 tablets  -     hydrOXYzine (ATARAX) 25 MG tablet; Take 1 tablet by mouth 3 (Three) Times a Day As Needed for Anxiety.         Plan of care reviewed with patient at the conclusion of today's visit. Education was provided regarding diagnosis, management and any prescribed or recommended OTC medications.  Patient verbalizes understanding of and agreement with management plan.    Return in about 4 weeks (around 5/30/2019) for Recheck, hip pain and depression.     Jennyfer Mauricio PA-C    "

## 2019-05-14 ENCOUNTER — OFFICE VISIT (OUTPATIENT)
Dept: FAMILY MEDICINE CLINIC | Facility: CLINIC | Age: 49
End: 2019-05-14

## 2019-05-14 VITALS
WEIGHT: 249.6 LBS | TEMPERATURE: 98.2 F | BODY MASS INDEX: 41.59 KG/M2 | HEIGHT: 65 IN | DIASTOLIC BLOOD PRESSURE: 72 MMHG | OXYGEN SATURATION: 95 % | SYSTOLIC BLOOD PRESSURE: 108 MMHG | HEART RATE: 71 BPM

## 2019-05-14 DIAGNOSIS — J20.9 ACUTE BRONCHITIS, UNSPECIFIED ORGANISM: ICD-10-CM

## 2019-05-14 DIAGNOSIS — G47.00 INSOMNIA, UNSPECIFIED TYPE: Primary | ICD-10-CM

## 2019-05-14 DIAGNOSIS — G47.00 INSOMNIA, UNSPECIFIED TYPE: ICD-10-CM

## 2019-05-14 DIAGNOSIS — R35.0 URINARY FREQUENCY: Primary | ICD-10-CM

## 2019-05-14 LAB
BILIRUB BLD-MCNC: NEGATIVE MG/DL
CLARITY, POC: CLEAR
COLOR UR: YELLOW
GLUCOSE UR STRIP-MCNC: NEGATIVE MG/DL
KETONES UR QL: ABNORMAL
LEUKOCYTE EST, POC: ABNORMAL
NITRITE UR-MCNC: NEGATIVE MG/ML
PH UR: 6 [PH] (ref 5–8)
PROT UR STRIP-MCNC: ABNORMAL MG/DL
RBC # UR STRIP: NEGATIVE /UL
SP GR UR: 1.02 (ref 1–1.03)
UROBILINOGEN UR QL: NORMAL

## 2019-05-14 PROCEDURE — 99214 OFFICE O/P EST MOD 30 MIN: CPT | Performed by: PHYSICIAN ASSISTANT

## 2019-05-14 PROCEDURE — 87086 URINE CULTURE/COLONY COUNT: CPT | Performed by: PHYSICIAN ASSISTANT

## 2019-05-14 PROCEDURE — 81003 URINALYSIS AUTO W/O SCOPE: CPT | Performed by: PHYSICIAN ASSISTANT

## 2019-05-14 RX ORDER — DEXTROMETHORPHAN HYDROBROMIDE AND PROMETHAZINE HYDROCHLORIDE 15; 6.25 MG/5ML; MG/5ML
5 SYRUP ORAL 4 TIMES DAILY PRN
Qty: 240 ML | Refills: 1 | Status: SHIPPED | OUTPATIENT
Start: 2019-05-14 | End: 2019-06-18

## 2019-05-14 RX ORDER — TEMAZEPAM 15 MG/1
15 CAPSULE ORAL NIGHTLY PRN
Qty: 30 CAPSULE | Refills: 2 | Status: SHIPPED | OUTPATIENT
Start: 2019-05-14 | End: 2019-06-18

## 2019-05-14 RX ORDER — ALBUTEROL SULFATE 90 UG/1
2 AEROSOL, METERED RESPIRATORY (INHALATION) EVERY 4 HOURS PRN
Qty: 18 G | Refills: 0 | Status: SHIPPED | OUTPATIENT
Start: 2019-05-14 | End: 2021-08-26

## 2019-05-14 RX ORDER — AMOXICILLIN AND CLAVULANATE POTASSIUM 875; 125 MG/1; MG/1
1 TABLET, FILM COATED ORAL EVERY 12 HOURS SCHEDULED
Qty: 20 TABLET | Refills: 0 | Status: SHIPPED | OUTPATIENT
Start: 2019-05-14 | End: 2019-05-24

## 2019-05-14 NOTE — PROGRESS NOTES
Chief Complaint   Patient presents with   • Abdominal Pain     left side, nausea and vomiting and urinary frequency. Started this past weekend.       HPI      Haylee Hayes is a 49 y.o. female who presents for Abdominal Pain (left side, nausea and vomiting and urinary frequency. Started this past weekend.)    Patient reports flank pain, nausea, vomiting and urinary frequency that started this last weekend.  She thinks she passed a stone over the weekend.  She has not had any more nausea or vomiting.  Continues to have urinary frequency.  She also has had a fever, chills and productive cough that started shortly after her episode of nausea/vomiting/possible stone passing.  No antibiotic allergies.      Past Medical History:   Diagnosis Date   • Acute respiratory failure with hypoxia (CMS/[facility]) 2018   • Chest pain 2018   • Chronic pain 2018   • Depression    • DVT (deep venous thrombosis) (CMS/[facility])     left arm,  андрей   • Kidney stone     around early , has had some prior to that as well, passed all of them without surgery   • Obesity 2018   • Peptic ulceration    • Pneumonia     never had to be hospitalized for it       Past Surgical History:   Procedure Laterality Date   • APPENDECTOMY      age 15, they removed appendix while doing oopherectomy   •  SECTION     • CHOLECYSTECTOMY N/A 2018    Procedure: CHOLECYSTECTOMY LAPAROSCOPIC;  Surgeon: Edmund BEE MD;  Location:  [facility];  Service: General   • ENDOSCOPY N/A 6/3/2018    Procedure: ESOPHAGOGASTRODUODENOSCOPY;  Surgeon: Mark I Brunner, MD;  Location:  [facility];  Service: Gastroenterology   • GASTRECTOMY      [facility]   • RIGHT OOPHORECTOMY      torque   • TONSILLECTOMY      age 17       Family History   Problem Relation Age of Onset   • Atrial fibrillation Mother    • Heart failure Mother    • Diabetes Father    • Stroke Father    • No Known Problems Son    • Coronary artery disease Maternal  Grandmother    • Valvular heart disease Maternal Grandmother    • Tuberculosis Maternal Grandfather    • Tuberculosis Paternal Grandfather    • Breast cancer Neg Hx    • Ovarian cancer Neg Hx    • Cancer Neg Hx        Social History     Socioeconomic History   • Marital status:      Spouse name: Not on file   • Number of children: Not on file   • Years of education: Not on file   • Highest education level: Not on file   Tobacco Use   • Smoking status: Former Smoker     Packs/day: 0.25     Types: Cigarettes   • Smokeless tobacco: Never Used   • Tobacco comment: About a third a pack a day, quit 2/2018   Substance and Sexual Activity   • Alcohol use: Yes     Comment: about once a month 1-2 drinks at a dinner, no hx heavy use or abuse other than more social in college years   • Drug use: No   • Sexual activity: Defer   Social History Narrative    Mrs. Hayes is a 48 year old white  female. She lives in [facility]. They have two sons. 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 She does not have an advanced directive.       Allergies   Allergen Reactions   • Coumadin [Warfarin Sodium] Anaphylaxis     Swelling of throat. LUE DVT in 2005 when inpatient for unknown cause, did fine on heparin gtt, home on coumadin and anaphylaxis, completed and tolerated 6 months lovenox.   • Dilaudid [Hydromorphone Hcl] Other (See Comments)     Oxygen, blood pressure drops that takes several days to resolve, affects loc       ROS    Review of Systems   Constitutional: Positive for chills, diaphoresis, fatigue and fever.   HENT: Positive for congestion, postnasal drip, rhinorrhea, sneezing and sore throat. Negative for ear pain and sinus pressure.    Respiratory: Positive for cough, shortness of breath and wheezing.    Genitourinary: Positive for urgency. Negative for flank pain and hematuria.   Neurological: Positive for headache. Negative for light-headedness.  "      Vitals:    05/14/19 0724   BP: 108/72   BP Location: Right arm   Patient Position: Sitting   Cuff Size: Large Adult   Pulse: 71   Temp: 98.2 °F (36.8 °C)   TempSrc: Oral   SpO2: 95%   Weight: 113 kg (249 lb 9.6 oz)   Height: 165.1 cm (65\")       Current Outpatient Medications on File Prior to Visit   Medication Sig Dispense Refill   • buPROPion XL (WELLBUTRIN XL) 150 MG 24 hr tablet Take 2 tablets by mouth Daily. 14 tablet 0   • citalopram (CeleXA) 40 MG tablet Take 1 tablet by mouth Daily. 90 tablet 1   • diclofenac (VOLTAREN) 1 % gel gel Apply 4 g topically to the appropriate area as directed 4 (Four) Times a Day As Needed (prn for pain). 60 tube 0   • DULoxetine (CYMBALTA) 30 MG capsule Take 1 tablet daily for 14 days, then increase to 2 tablets 60 capsule 1   • pantoprazole (PROTONIX) 40 MG EC tablet Take 1 tablet by mouth 2 (Two) Times a Day Before Meals. 180 tablet 1   • temazepam (RESTORIL) 15 MG capsule Take 15 mg by mouth As Needed for Sleep.     • [DISCONTINUED] hydrOXYzine (ATARAX) 25 MG tablet Take 1 tablet by mouth 3 (Three) Times a Day As Needed for Anxiety. 30 tablet 0     No current facility-administered medications on file prior to visit.        Results for orders placed or performed in visit on 05/14/19   POCT urinalysis dipstick, automated   Result Value Ref Range    Color Yellow Yellow, Straw, Dark Yellow, Inocencia    Clarity, UA Clear Clear    Specific Gravity  1.020 1.005 - 1.030    pH, Urine 6.0 5.0 - 8.0    Leukocytes Small (1+) (A) Negative    Nitrite, UA Negative Negative    Protein, POC Trace (A) Negative mg/dL    Glucose, UA Negative Negative, 1000 mg/dL (3+) mg/dL    Ketones, UA Trace (A) Negative    Urobilinogen, UA Normal Normal    Bilirubin Negative Negative    Blood, UA Negative Negative       PE    Physical Exam   Constitutional: Vital signs are normal. She appears well-developed and well-nourished. She is active and cooperative. She appears ill. No distress. She is morbidly " obese.  HENT:   Head: Normocephalic and atraumatic.   Right Ear: Hearing, tympanic membrane, external ear and ear canal normal.   Left Ear: Hearing, tympanic membrane, external ear and ear canal normal.   Nose: Nose normal. Right sinus exhibits no maxillary sinus tenderness and no frontal sinus tenderness. Left sinus exhibits no maxillary sinus tenderness and no frontal sinus tenderness.   Mouth/Throat: Uvula is midline and oropharynx is clear and moist.   Eyes: EOM are normal.   Neck: Normal range of motion.   Cardiovascular: Normal rate, regular rhythm and normal heart sounds.   Pulmonary/Chest: Effort normal. She has no decreased breath sounds. She has wheezes. She has rales.   Musculoskeletal: Normal range of motion.   Neurological: She is alert.   Skin: Skin is warm. She is not diaphoretic. No erythema.   Psychiatric: She has a normal mood and affect. Her speech is normal and behavior is normal. Judgment and thought content normal. She is not actively hallucinating. She is attentive.        A/P    Haylee was seen today for abdominal pain.    Diagnoses and all orders for this visit:    Urinary frequency  -will treat with augmentin  -     POCT urinalysis dipstick, automated, send for culture  -     amoxicillin-clavulanate (AUGMENTIN) 875-125 MG per tablet; Take 1 tablet by mouth Every 12 (Twelve) Hours for 10 days.    Acute bronchitis, unspecified organism  -lungs CTA with rales and wheezing  -will prescribe cough syrup for night  -will cover with augmentin  -will prescribe albuterol inhaler prn  -     promethazine-dextromethorphan (PROMETHAZINE-DM) 6.25-15 MG/5ML syrup; Take 5 mL by mouth 4 (Four) Times a Day As Needed for Cough.  -     albuterol sulfate HFA (PROAIR HFA) 108 (90 Base) MCG/ACT inhaler; Inhale 2 puffs Every 4 (Four) Hours As Needed for Wheezing.  -     amoxicillin-clavulanate (AUGMENTIN) 875-125 MG per tablet; Take 1 tablet by mouth Every 12 (Twelve) Hours for 10 days.    Insomnia  -on restoril  15 mg at night   -this helps patient fall and stay asleep  -has tried to go without medication, has trouble sleeping  -will obtain updated UDS, contract and shan  -fu in 3 months    This patient is on a controlled substance which improves symptoms/quality of life and is aware of the risks, benefits and possible side-effects current treatment. The patient denies any medication side-effects at this time. A controlled substance agreement will be obtained or is currently on file. We reviewed required monitoring for controlled substances including but not limited to quarterly follow-up visits, annual depression screening, and urine drug screens to which the patient is agreeable. A SHAN report has been or shortly will be reviewed. There are no signs of deviation or misuse.         Plan of care reviewed with patient at the conclusion of today's visit. Education was provided regarding diagnosis, management and any prescribed or recommended OTC medications.  Patient verbalizes understanding of and agreement with management plan.    Return in about 3 months (around 8/14/2019) for Recheck.     Jennyfer Mauricio PA-C

## 2019-05-15 LAB — BACTERIA SPEC AEROBE CULT: NORMAL

## 2019-06-13 ENCOUNTER — TELEPHONE (OUTPATIENT)
Dept: FAMILY MEDICINE CLINIC | Facility: CLINIC | Age: 49
End: 2019-06-13

## 2019-06-13 NOTE — TELEPHONE ENCOUNTER
Have patient decrease cymbalta to 30 mg daily, start wellbutrin 150 mg daily and continue celexa 40 mg daily.  She should discontinue cymbalta in 7 days and increase wellbutrin to 300 mg when she discontinues cymbalta.  Want to see patient within the next 1-2 weeks for appointment.  Call if depression is worse.

## 2019-06-13 NOTE — TELEPHONE ENCOUNTER
PATIENT IS NOT DOING WELL WITH THE MEDICATION CHANGE. IS NOT SLEEPING, HAVING ANXIETY AND DEPRESSION IS STRONGER.

## 2019-06-14 NOTE — TELEPHONE ENCOUNTER
Called patient and gave message below. Pt verbalized understanding and had no further questions or concerns at this time. Pt is scheduled for appt on Tuesday and states that she will keep that appointment

## 2019-06-18 ENCOUNTER — OFFICE VISIT (OUTPATIENT)
Dept: FAMILY MEDICINE CLINIC | Facility: CLINIC | Age: 49
End: 2019-06-18

## 2019-06-18 ENCOUNTER — APPOINTMENT (OUTPATIENT)
Dept: LAB | Facility: HOSPITAL | Age: 49
End: 2019-06-18

## 2019-06-18 VITALS
DIASTOLIC BLOOD PRESSURE: 70 MMHG | HEART RATE: 72 BPM | HEIGHT: 65 IN | BODY MASS INDEX: 42.02 KG/M2 | WEIGHT: 252.2 LBS | SYSTOLIC BLOOD PRESSURE: 110 MMHG | OXYGEN SATURATION: 97 %

## 2019-06-18 DIAGNOSIS — G47.00 INSOMNIA, UNSPECIFIED TYPE: ICD-10-CM

## 2019-06-18 DIAGNOSIS — F33.41 RECURRENT MAJOR DEPRESSIVE DISORDER, IN PARTIAL REMISSION (HCC): Primary | ICD-10-CM

## 2019-06-18 DIAGNOSIS — E61.1 IRON DEFICIENCY: ICD-10-CM

## 2019-06-18 DIAGNOSIS — G89.29 OTHER CHRONIC PAIN: Chronic | ICD-10-CM

## 2019-06-18 DIAGNOSIS — R73.02 IMPAIRED GLUCOSE TOLERANCE: ICD-10-CM

## 2019-06-18 DIAGNOSIS — G89.29 OTHER CHRONIC PAIN: Primary | Chronic | ICD-10-CM

## 2019-06-18 LAB
BASOPHILS # BLD AUTO: 0.04 10*3/MM3 (ref 0–0.2)
BASOPHILS NFR BLD AUTO: 0.6 % (ref 0–1.5)
DEPRECATED RDW RBC AUTO: 44.2 FL (ref 37–54)
EOSINOPHIL # BLD AUTO: 0.18 10*3/MM3 (ref 0–0.4)
EOSINOPHIL NFR BLD AUTO: 2.5 % (ref 0.3–6.2)
ERYTHROCYTE [DISTWIDTH] IN BLOOD BY AUTOMATED COUNT: 13.2 % (ref 12.3–15.4)
FERRITIN SERPL-MCNC: 85.9 NG/ML (ref 13–150)
FOLATE SERPL-MCNC: 12.4 NG/ML (ref 4.78–24.2)
HBA1C MFR BLD: 5.5 % (ref 4.8–5.6)
HCT VFR BLD AUTO: 46.6 % (ref 34–46.6)
HGB BLD-MCNC: 14.9 G/DL (ref 12–15.9)
IMM GRANULOCYTES # BLD AUTO: 0.03 10*3/MM3 (ref 0–0.05)
IMM GRANULOCYTES NFR BLD AUTO: 0.4 % (ref 0–0.5)
LYMPHOCYTES # BLD AUTO: 3.04 10*3/MM3 (ref 0.7–3.1)
LYMPHOCYTES NFR BLD AUTO: 42.9 % (ref 19.6–45.3)
MCH RBC QN AUTO: 29.2 PG (ref 26.6–33)
MCHC RBC AUTO-ENTMCNC: 32 G/DL (ref 31.5–35.7)
MCV RBC AUTO: 91.2 FL (ref 79–97)
MONOCYTES # BLD AUTO: 0.52 10*3/MM3 (ref 0.1–0.9)
MONOCYTES NFR BLD AUTO: 7.3 % (ref 5–12)
NEUTROPHILS # BLD AUTO: 3.27 10*3/MM3 (ref 1.7–7)
NEUTROPHILS NFR BLD AUTO: 46.3 % (ref 42.7–76)
NRBC BLD AUTO-RTO: 0 /100 WBC (ref 0–0.2)
PLATELET # BLD AUTO: 244 10*3/MM3 (ref 140–450)
PMV BLD AUTO: 10.6 FL (ref 6–12)
RBC # BLD AUTO: 5.11 10*6/MM3 (ref 3.77–5.28)
RETICS # AUTO: 0.07 10*6/MM3 (ref 0.02–0.13)
RETICS/RBC NFR AUTO: 1.32 % (ref 0.7–1.9)
TSH SERPL DL<=0.05 MIU/L-ACNC: 1.54 MIU/ML (ref 0.27–4.2)
VIT B12 BLD-MCNC: 338 PG/ML (ref 211–946)
WBC NRBC COR # BLD: 7.08 10*3/MM3 (ref 3.4–10.8)

## 2019-06-18 PROCEDURE — 86140 C-REACTIVE PROTEIN: CPT | Performed by: PHYSICIAN ASSISTANT

## 2019-06-18 PROCEDURE — 82746 ASSAY OF FOLIC ACID SERUM: CPT | Performed by: PHYSICIAN ASSISTANT

## 2019-06-18 PROCEDURE — 36415 COLL VENOUS BLD VENIPUNCTURE: CPT | Performed by: PHYSICIAN ASSISTANT

## 2019-06-18 PROCEDURE — 84443 ASSAY THYROID STIM HORMONE: CPT | Performed by: PHYSICIAN ASSISTANT

## 2019-06-18 PROCEDURE — 99214 OFFICE O/P EST MOD 30 MIN: CPT | Performed by: PHYSICIAN ASSISTANT

## 2019-06-18 PROCEDURE — 80053 COMPREHEN METABOLIC PANEL: CPT | Performed by: PHYSICIAN ASSISTANT

## 2019-06-18 PROCEDURE — 83540 ASSAY OF IRON: CPT | Performed by: PHYSICIAN ASSISTANT

## 2019-06-18 PROCEDURE — 82607 VITAMIN B-12: CPT | Performed by: PHYSICIAN ASSISTANT

## 2019-06-18 PROCEDURE — 82728 ASSAY OF FERRITIN: CPT | Performed by: PHYSICIAN ASSISTANT

## 2019-06-18 PROCEDURE — 83036 HEMOGLOBIN GLYCOSYLATED A1C: CPT | Performed by: PHYSICIAN ASSISTANT

## 2019-06-18 PROCEDURE — 85045 AUTOMATED RETICULOCYTE COUNT: CPT | Performed by: PHYSICIAN ASSISTANT

## 2019-06-18 PROCEDURE — 85025 COMPLETE CBC W/AUTO DIFF WBC: CPT | Performed by: PHYSICIAN ASSISTANT

## 2019-06-18 RX ORDER — GABAPENTIN 300 MG/1
300 CAPSULE ORAL 2 TIMES DAILY
Qty: 60 CAPSULE | Refills: 0 | Status: SHIPPED | OUTPATIENT
Start: 2019-06-18 | End: 2019-07-30 | Stop reason: SDUPTHER

## 2019-06-18 NOTE — PROGRESS NOTES
Chief Complaint   Patient presents with   • Depression     still not doing very well, feels like she was headed towards a depressive state before changing medications. Has been having issues with concentration, sleeping, eyes hurting, etc.       HPI     Haylee Hayes is a pleasant 49 y.o. female who is here for follow-up of depression.  Patient has ongoing chronic pain throughout her body, worsening depression and insomnia.  Recently tried to switch patient from wellbutrin 300 mg to cymbalta 60 mg to help with pain, patient was not able to tolerate this and depression is worse.  She is back taking wellbutrin 300 mg QD, has been on this for about 5 days.  Off of cymbalta.  She is still taking celexa 40 mg daily and has been on this for years.  She is taking restoril 15 mg at night and is unsure how much benefit she is getting.  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  She has applied for transfer to days, pending opening.  She is also in school full-time and father was recently placed in nursing home (dementia issues) in [place].  She was taking gabapentin 1200 mg BID a few years ago and tapered herself off of medication a few months ago.  She believes this is around the time depression started to worsen and pain increased.    Past Medical History:   Diagnosis Date   • Acute respiratory failure with hypoxia (CMS/HCC) 2018   • Chest pain 2018   • Chronic pain 2018   • Depression    • DVT (deep venous thrombosis) (CMS/HCC)     left arm,  андрей   • Kidney stone     around early , has had some prior to that as well, passed all of them without surgery   • Obesity 2018   • Peptic ulceration    • Pneumonia     never had to be hospitalized for it       Past Surgical History:   Procedure Laterality Date   • APPENDECTOMY      age 15, they removed appendix while doing oopherectomy   •  SECTION     • CHOLECYSTECTOMY N/A 2018    Procedure:  CHOLECYSTECTOMY LAPAROSCOPIC;  Surgeon: Edmund BEE MD;  Location:  [facility];  Service: General   • ENDOSCOPY N/A 6/3/2018    Procedure: ESOPHAGOGASTRODUODENOSCOPY;  Surgeon: Mark I Brunner, MD;  Location:  [facility];  Service: Gastroenterology   • GASTRECTOMY      [facility]   • RIGHT OOPHORECTOMY      torque   • TONSILLECTOMY      age 17       Family History   Problem Relation Age of Onset   • Atrial fibrillation Mother    • Heart failure Mother    • Diabetes Father    • Stroke Father    • No Known Problems Son    • Coronary artery disease Maternal Grandmother    • Valvular heart disease Maternal Grandmother    • Tuberculosis Maternal Grandfather    • Tuberculosis Paternal Grandfather    • Breast cancer Neg Hx    • Ovarian cancer Neg Hx    • Cancer Neg Hx        Social History     Socioeconomic History   • Marital status:      Spouse name: Not on file   • Number of children: Not on file   • Years of education: Not on file   • Highest education level: Not on file   Tobacco Use   • Smoking status: Former Smoker     Packs/day: 0.25     Types: Cigarettes   • Smokeless tobacco: Never Used   • Tobacco comment: About a third a pack a day, quit 2/2018   Substance and Sexual Activity   • Alcohol use: Yes     Comment: about once a month 1-2 drinks at a dinner, no hx heavy use or abuse other than more social in college years   • Drug use: No   • Sexual activity: Defer   Social History Narrative    Mrs. Hayes is a 48 year old white  female. She lives in [facility]. They have two sons. 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 She does not have an advanced directive.       Allergies   Allergen Reactions   • Coumadin [Warfarin Sodium] Anaphylaxis     Swelling of throat. LUE DVT in 2005 when inpatient for unknown cause, did fine on heparin gtt, home on coumadin and anaphylaxis, completed and tolerated 6 months lovenox.   • Dilaudid  "[Hydromorphone Hcl] Other (See Comments)     Oxygen, blood pressure drops that takes several days to resolve, affects loc   • Cymbalta [Duloxetine Hcl] Other (See Comments)     Worsening depression, CNS issues   • Paxil [Paroxetine Hcl] Other (See Comments)     Didn't tolerate   • Prozac [Fluoxetine Hcl] Other (See Comments)     Didn't tolerate       ROS    Review of Systems   Constitutional: Positive for fatigue. Negative for chills, diaphoresis and fever.   Respiratory: Negative for cough, shortness of breath and wheezing.    Cardiovascular: Negative for chest pain, palpitations and leg swelling.   Musculoskeletal: Positive for arthralgias, back pain, myalgias and neck pain. Negative for gait problem and joint swelling.   Neurological: Negative for dizziness, seizures and headache.   Psychiatric/Behavioral: Positive for sleep disturbance, depressed mood and stress. Negative for self-injury and suicidal ideas. The patient is not nervous/anxious.        Vitals:    06/18/19 1030   BP: 110/70   BP Location: Right arm   Patient Position: Sitting   Cuff Size: Large Adult   Pulse: 72   SpO2: 97%   Weight: 114 kg (252 lb 3.2 oz)   Height: 165.1 cm (65\")       Current Outpatient Medications on File Prior to Visit   Medication Sig Dispense Refill   • albuterol sulfate HFA (PROAIR HFA) 108 (90 Base) MCG/ACT inhaler Inhale 2 puffs Every 4 (Four) Hours As Needed for Wheezing. 18 g 0   • buPROPion XL (WELLBUTRIN XL) 150 MG 24 hr tablet Take 2 tablets by mouth Daily. 14 tablet 0   • citalopram (CeleXA) 40 MG tablet Take 1 tablet by mouth Daily. 90 tablet 1   • diclofenac (VOLTAREN) 1 % gel gel Apply 4 g topically to the appropriate area as directed 4 (Four) Times a Day As Needed (prn for pain). 60 tube 0   • temazepam (RESTORIL) 15 MG capsule Take 1 capsule by mouth At Night As Needed for Sleep. 30 capsule 2   • pantoprazole (PROTONIX) 40 MG EC tablet Take 1 tablet by mouth 2 (Two) Times a Day Before Meals. 180 tablet 1   • " [DISCONTINUED] DULoxetine (CYMBALTA) 30 MG capsule Take 1 tablet daily for 14 days, then increase to 2 tablets 60 capsule 1   • [DISCONTINUED] promethazine-dextromethorphan (PROMETHAZINE-DM) 6.25-15 MG/5ML syrup Take 5 mL by mouth 4 (Four) Times a Day As Needed for Cough. 240 mL 1     No current facility-administered medications on file prior to visit.        Results for orders placed or performed in visit on 05/14/19   Urine Culture - Urine, Urine, Random Void   Result Value Ref Range    Urine Culture 25,000 CFU/mL Mixed Mary Ann Isolated    POCT urinalysis dipstick, automated   Result Value Ref Range    Color Yellow Yellow, Straw, Dark Yellow, Inocencia    Clarity, UA Clear Clear    Specific Gravity  1.020 1.005 - 1.030    pH, Urine 6.0 5.0 - 8.0    Leukocytes Small (1+) (A) Negative    Nitrite, UA Negative Negative    Protein, POC Trace (A) Negative mg/dL    Glucose, UA Negative Negative, 1000 mg/dL (3+) mg/dL    Ketones, UA Trace (A) Negative    Urobilinogen, UA Normal Normal    Bilirubin Negative Negative    Blood, UA Negative Negative       PE    Physical Exam   Constitutional: Vital signs are normal. She appears well-developed and well-nourished. She is active and cooperative. She appears ill. No distress. She is morbidly obese.  HENT:   Head: Normocephalic and atraumatic.   Eyes: EOM are normal.   Neck: Normal range of motion.   Cardiovascular: Normal rate, regular rhythm and normal heart sounds.   Pulmonary/Chest: Effort normal and breath sounds normal.   Musculoskeletal: Normal range of motion.   Neurological: She is alert.   Skin: Skin is warm. She is not diaphoretic. No erythema.   Psychiatric: Her speech is normal. Judgment and thought content normal. She is slowed. She is not actively hallucinating. Cognition and memory are normal. She exhibits a depressed mood.   Tearful during appointment She is attentive.       A/P    Haylee was seen today for depression.    Diagnoses and all orders for this  visit:    Recurrent major depressive disorder, in partial remission (CMS/HCC)  -worsening depression recently with situational events and trying to switch from cymbalta to wellbutrin  -back on wellbutrin 300 mg daily, has been on this dose for 5 days  -discontinued cymbalta, did not help with pain/made her feel weird and worse depression  -on celexa 40 mg daily, has been on this for years  -working nights at  in labor/delivery as nurse, trying to switch to days  -has missed work due to depression recently and is tearful during appointment  -father recently placed in nursing home in [place] with memory issues  -going back to school full-time, considering doing less this upcoming semester due to depression and life stress  -denies suicidal ideation, just wants to sleep although she has difficulty sleeping  -will order basic labs  -continue celexa 40 mg QD, wellbutrin 300 mg QD  -on restoril 15 mg QPM, discontinue as she is getting no real benefit  -would consider increasing wellbutrin although would recommend she go to psychiatry first  -will restart gabapentin 300 mg BID as she was probably getting mood boosting with this in addition to pain management  -fu in 4 weeks, call in 1 week to check in  -     Iron  -     Ferritin  -     CBC and Differential  -     Reticulocytes  -     Vitamin B12 and Folate  -     Comprehensive Metabolic Panel  -     TSH  -     CBC Auto Differential    Other chronic pain  -possibly fibromyalgia, has never been tested  -failed cymbalta  -restart gabapentin 300 mg BID  -fu in 1 month, if she continues on gabapentin will update UDS/linden and contract  -     C-reactive Protein    Iron deficiency  -deficient on previous labs, will evaluate given fatigue and depression  -     Iron  -     Ferritin  -     CBC and Differential  -     Reticulocytes  -     CBC Auto Differential    Insomnia, unspecified type  -working nights as nurse, trying to switch to days  -unclear if she is getting benefit  from restoril, discontinue  -try melatonin    Impaired glucose tolerance  -     Hemoglobin A1c         Plan of care reviewed with patient at the conclusion of today's visit. Education was provided regarding diagnosis, management and any prescribed or recommended OTC medications.  Patient verbalizes understanding of and agreement with management plan.    Return in about 4 weeks (around 7/16/2019) for Recheck, depression/pain.     Jennyfer Mauricio PA-C

## 2019-06-19 LAB
ALBUMIN SERPL-MCNC: 4.1 G/DL (ref 3.5–5.2)
ALBUMIN/GLOB SERPL: 1.3 G/DL
ALP SERPL-CCNC: 106 U/L (ref 39–117)
ALT SERPL W P-5'-P-CCNC: 21 U/L (ref 1–33)
ANION GAP SERPL CALCULATED.3IONS-SCNC: 12.8 MMOL/L
AST SERPL-CCNC: 22 U/L (ref 1–32)
BILIRUB SERPL-MCNC: 0.3 MG/DL (ref 0.2–1.2)
BUN BLD-MCNC: 14 MG/DL (ref 6–20)
BUN/CREAT SERPL: 18.2 (ref 7–25)
CALCIUM SPEC-SCNC: 9.1 MG/DL (ref 8.6–10.5)
CHLORIDE SERPL-SCNC: 104 MMOL/L (ref 98–107)
CO2 SERPL-SCNC: 26.2 MMOL/L (ref 22–29)
CREAT BLD-MCNC: 0.77 MG/DL (ref 0.57–1)
CRP SERPL-MCNC: 0.45 MG/DL (ref 0–0.5)
GFR SERPL CREATININE-BSD FRML MDRD: 79 ML/MIN/1.73
GLOBULIN UR ELPH-MCNC: 3.1 GM/DL
GLUCOSE BLD-MCNC: 88 MG/DL (ref 65–99)
IRON 24H UR-MRATE: 108 MCG/DL (ref 37–145)
POTASSIUM BLD-SCNC: 4.5 MMOL/L (ref 3.5–5.2)
PROT SERPL-MCNC: 7.2 G/DL (ref 6–8.5)
SODIUM BLD-SCNC: 143 MMOL/L (ref 136–145)

## 2019-07-01 ENCOUNTER — TELEPHONE (OUTPATIENT)
Dept: FAMILY MEDICINE CLINIC | Facility: CLINIC | Age: 49
End: 2019-07-01

## 2019-07-30 DIAGNOSIS — G89.29 OTHER CHRONIC PAIN: Chronic | ICD-10-CM

## 2019-07-30 RX ORDER — GABAPENTIN 300 MG/1
CAPSULE ORAL
Qty: 60 CAPSULE | Refills: 0 | Status: SHIPPED | OUTPATIENT
Start: 2019-07-30 | End: 2019-10-28 | Stop reason: SDUPTHER

## 2019-09-09 ENCOUNTER — HOSPITAL ENCOUNTER (EMERGENCY)
Facility: HOSPITAL | Age: 49
Discharge: HOME | End: 2019-09-09
Attending: EMERGENCY MEDICINE | Admitting: EMERGENCY MEDICINE

## 2019-09-09 ENCOUNTER — APPOINTMENT (OUTPATIENT)
Dept: GENERAL RADIOLOGY | Facility: HOSPITAL | Age: 49
End: 2019-09-09

## 2019-09-09 ENCOUNTER — APPOINTMENT (OUTPATIENT)
Dept: CT IMAGING | Facility: HOSPITAL | Age: 49
End: 2019-09-09

## 2019-09-09 VITALS
BODY MASS INDEX: 40.18 KG/M2 | TEMPERATURE: 98.9 F | WEIGHT: 250 LBS | HEART RATE: 64 BPM | HEIGHT: 66 IN | SYSTOLIC BLOOD PRESSURE: 112 MMHG | OXYGEN SATURATION: 97 % | DIASTOLIC BLOOD PRESSURE: 63 MMHG | RESPIRATION RATE: 18 BRPM

## 2019-09-09 DIAGNOSIS — R19.7 DIARRHEA, UNSPECIFIED TYPE: ICD-10-CM

## 2019-09-09 DIAGNOSIS — R10.9 ACUTE ABDOMINAL PAIN: Primary | ICD-10-CM

## 2019-09-09 DIAGNOSIS — R11.2 NAUSEA VOMITING AND DIARRHEA: ICD-10-CM

## 2019-09-09 DIAGNOSIS — R19.7 NAUSEA VOMITING AND DIARRHEA: ICD-10-CM

## 2019-09-09 DIAGNOSIS — D35.02 ADENOMA OF LEFT ADRENAL GLAND: ICD-10-CM

## 2019-09-09 LAB
ALBUMIN SERPL-MCNC: 3.9 G/DL (ref 3.5–5.2)
ALBUMIN/GLOB SERPL: 1.3 G/DL
ALP SERPL-CCNC: 99 U/L (ref 39–117)
ALT SERPL W P-5'-P-CCNC: 18 U/L (ref 1–33)
ANION GAP SERPL CALCULATED.3IONS-SCNC: 12 MMOL/L (ref 5–15)
AST SERPL-CCNC: 31 U/L (ref 1–32)
B-HCG UR QL: NEGATIVE
BACTERIA UR QL AUTO: ABNORMAL /HPF
BASOPHILS # BLD AUTO: 0.03 10*3/MM3 (ref 0–0.2)
BASOPHILS NFR BLD AUTO: 0.4 % (ref 0–1.5)
BILIRUB SERPL-MCNC: 0.2 MG/DL (ref 0.2–1.2)
BILIRUB UR QL STRIP: NEGATIVE
BUN BLD-MCNC: 12 MG/DL (ref 6–20)
BUN/CREAT SERPL: 16 (ref 7–25)
C DIFF TOX GENS STL QL NAA+PROBE: NOT DETECTED
CALCIUM SPEC-SCNC: 8.5 MG/DL (ref 8.6–10.5)
CHLORIDE SERPL-SCNC: 109 MMOL/L (ref 98–107)
CLARITY UR: CLEAR
CO2 SERPL-SCNC: 22 MMOL/L (ref 22–29)
COLOR UR: YELLOW
CREAT BLD-MCNC: 0.75 MG/DL (ref 0.57–1)
DEPRECATED RDW RBC AUTO: 37.2 FL (ref 37–54)
EOSINOPHIL # BLD AUTO: 0.2 10*3/MM3 (ref 0–0.4)
EOSINOPHIL NFR BLD AUTO: 2.4 % (ref 0.3–6.2)
ERYTHROCYTE [DISTWIDTH] IN BLOOD BY AUTOMATED COUNT: 12.2 % (ref 12.3–15.4)
GFR SERPL CREATININE-BSD FRML MDRD: 82 ML/MIN/1.73
GLOBULIN UR ELPH-MCNC: 2.9 GM/DL
GLUCOSE BLD-MCNC: 94 MG/DL (ref 65–99)
GLUCOSE UR STRIP-MCNC: NEGATIVE MG/DL
HCT VFR BLD AUTO: 41.5 % (ref 34–46.6)
HGB BLD-MCNC: 14.2 G/DL (ref 12–15.9)
HGB UR QL STRIP.AUTO: ABNORMAL
HOLD SPECIMEN: NORMAL
HOLD SPECIMEN: NORMAL
HYALINE CASTS UR QL AUTO: ABNORMAL /LPF
IMM GRANULOCYTES # BLD AUTO: 0.06 10*3/MM3 (ref 0–0.05)
IMM GRANULOCYTES NFR BLD AUTO: 0.7 % (ref 0–0.5)
INTERNAL NEGATIVE CONTROL: NEGATIVE
INTERNAL POSITIVE CONTROL: POSITIVE
KETONES UR QL STRIP: NEGATIVE
LEUKOCYTE ESTERASE UR QL STRIP.AUTO: ABNORMAL
LIPASE SERPL-CCNC: 27 U/L (ref 13–60)
LYMPHOCYTES # BLD AUTO: 2.76 10*3/MM3 (ref 0.7–3.1)
LYMPHOCYTES NFR BLD AUTO: 32.9 % (ref 19.6–45.3)
Lab: NORMAL
MCH RBC QN AUTO: 29 PG (ref 26.6–33)
MCHC RBC AUTO-ENTMCNC: 34.2 G/DL (ref 31.5–35.7)
MCV RBC AUTO: 84.7 FL (ref 79–97)
MONOCYTES # BLD AUTO: 0.55 10*3/MM3 (ref 0.1–0.9)
MONOCYTES NFR BLD AUTO: 6.5 % (ref 5–12)
NEUTROPHILS # BLD AUTO: 4.8 10*3/MM3 (ref 1.7–7)
NEUTROPHILS NFR BLD AUTO: 57.1 % (ref 42.7–76)
NITRITE UR QL STRIP: NEGATIVE
NRBC BLD AUTO-RTO: 0 /100 WBC (ref 0–0.2)
PH UR STRIP.AUTO: 6.5 [PH] (ref 5–8)
PLATELET # BLD AUTO: 233 10*3/MM3 (ref 140–450)
PMV BLD AUTO: 10 FL (ref 6–12)
POTASSIUM BLD-SCNC: 3.8 MMOL/L (ref 3.5–5.2)
PROT SERPL-MCNC: 6.8 G/DL (ref 6–8.5)
PROT UR QL STRIP: NEGATIVE
RBC # BLD AUTO: 4.9 10*6/MM3 (ref 3.77–5.28)
RBC # UR: ABNORMAL /HPF
REF LAB TEST METHOD: ABNORMAL
SODIUM BLD-SCNC: 143 MMOL/L (ref 136–145)
SP GR UR STRIP: 1.02 (ref 1–1.03)
SQUAMOUS #/AREA URNS HPF: ABNORMAL /HPF
TROPONIN T SERPL-MCNC: <0.01 NG/ML (ref 0–0.03)
UROBILINOGEN UR QL STRIP: ABNORMAL
WBC NRBC COR # BLD: 8.4 10*3/MM3 (ref 3.4–10.8)
WBC STL QL MICRO: NORMAL
WBC UR QL AUTO: ABNORMAL /HPF
WHOLE BLOOD HOLD SPECIMEN: NORMAL
WHOLE BLOOD HOLD SPECIMEN: NORMAL

## 2019-09-09 PROCEDURE — 87046 STOOL CULTR AEROBIC BACT EA: CPT | Performed by: EMERGENCY MEDICINE

## 2019-09-09 PROCEDURE — 87493 C DIFF AMPLIFIED PROBE: CPT | Performed by: EMERGENCY MEDICINE

## 2019-09-09 PROCEDURE — 99284 EMERGENCY DEPT VISIT MOD MDM: CPT

## 2019-09-09 PROCEDURE — 87045 FECES CULTURE AEROBIC BACT: CPT | Performed by: EMERGENCY MEDICINE

## 2019-09-09 PROCEDURE — 0 DIATRIZOATE MEGLUMINE & SODIUM PER 1 ML: Performed by: EMERGENCY MEDICINE

## 2019-09-09 PROCEDURE — 25010000002 IOPAMIDOL 61 % SOLUTION: Performed by: EMERGENCY MEDICINE

## 2019-09-09 PROCEDURE — 80053 COMPREHEN METABOLIC PANEL: CPT | Performed by: EMERGENCY MEDICINE

## 2019-09-09 PROCEDURE — 87205 SMEAR GRAM STAIN: CPT | Performed by: EMERGENCY MEDICINE

## 2019-09-09 PROCEDURE — 84484 ASSAY OF TROPONIN QUANT: CPT | Performed by: EMERGENCY MEDICINE

## 2019-09-09 PROCEDURE — 74177 CT ABD & PELVIS W/CONTRAST: CPT

## 2019-09-09 PROCEDURE — 25010000002 FENTANYL CITRATE (PF) 100 MCG/2ML SOLUTION: Performed by: EMERGENCY MEDICINE

## 2019-09-09 PROCEDURE — 85025 COMPLETE CBC W/AUTO DIFF WBC: CPT | Performed by: EMERGENCY MEDICINE

## 2019-09-09 PROCEDURE — 81025 URINE PREGNANCY TEST: CPT | Performed by: EMERGENCY MEDICINE

## 2019-09-09 PROCEDURE — 25010000002 ONDANSETRON PER 1 MG

## 2019-09-09 PROCEDURE — 83690 ASSAY OF LIPASE: CPT | Performed by: EMERGENCY MEDICINE

## 2019-09-09 PROCEDURE — 96375 TX/PRO/DX INJ NEW DRUG ADDON: CPT

## 2019-09-09 PROCEDURE — 87177 OVA AND PARASITES SMEARS: CPT | Performed by: EMERGENCY MEDICINE

## 2019-09-09 PROCEDURE — 93005 ELECTROCARDIOGRAM TRACING: CPT | Performed by: EMERGENCY MEDICINE

## 2019-09-09 PROCEDURE — 81001 URINALYSIS AUTO W/SCOPE: CPT | Performed by: EMERGENCY MEDICINE

## 2019-09-09 PROCEDURE — 87209 SMEAR COMPLEX STAIN: CPT | Performed by: EMERGENCY MEDICINE

## 2019-09-09 PROCEDURE — 71045 X-RAY EXAM CHEST 1 VIEW: CPT

## 2019-09-09 PROCEDURE — 96374 THER/PROPH/DIAG INJ IV PUSH: CPT

## 2019-09-09 PROCEDURE — 25010000002 HYDROMORPHONE PER 4 MG: Performed by: EMERGENCY MEDICINE

## 2019-09-09 PROCEDURE — 25010000002 PROMETHAZINE PER 50 MG: Performed by: EMERGENCY MEDICINE

## 2019-09-09 PROCEDURE — 25010000002 MORPHINE PER 10 MG: Performed by: EMERGENCY MEDICINE

## 2019-09-09 RX ORDER — ONDANSETRON 2 MG/ML
4 INJECTION INTRAMUSCULAR; INTRAVENOUS ONCE
Status: COMPLETED | OUTPATIENT
Start: 2019-09-09 | End: 2019-09-09

## 2019-09-09 RX ORDER — ONDANSETRON 2 MG/ML
INJECTION INTRAMUSCULAR; INTRAVENOUS
Status: COMPLETED
Start: 2019-09-09 | End: 2019-09-09

## 2019-09-09 RX ORDER — SODIUM CHLORIDE 0.9 % (FLUSH) 0.9 %
10 SYRINGE (ML) INJECTION AS NEEDED
Status: DISCONTINUED | OUTPATIENT
Start: 2019-09-09 | End: 2019-09-09 | Stop reason: HOSPADM

## 2019-09-09 RX ORDER — PROMETHAZINE HYDROCHLORIDE 25 MG/1
25 TABLET ORAL EVERY 6 HOURS PRN
Qty: 12 TABLET | Refills: 0 | Status: SHIPPED | OUTPATIENT
Start: 2019-09-09 | End: 2019-10-28

## 2019-09-09 RX ORDER — MORPHINE SULFATE 4 MG/ML
4 INJECTION, SOLUTION INTRAMUSCULAR; INTRAVENOUS ONCE
Status: COMPLETED | OUTPATIENT
Start: 2019-09-09 | End: 2019-09-09

## 2019-09-09 RX ORDER — HYDROMORPHONE HYDROCHLORIDE 1 MG/ML
0.5 INJECTION, SOLUTION INTRAMUSCULAR; INTRAVENOUS; SUBCUTANEOUS ONCE
Status: COMPLETED | OUTPATIENT
Start: 2019-09-09 | End: 2019-09-09

## 2019-09-09 RX ORDER — HYDROCODONE BITARTRATE AND ACETAMINOPHEN 5; 325 MG/1; MG/1
1-2 TABLET ORAL EVERY 6 HOURS PRN
Qty: 12 TABLET | Refills: 0 | Status: SHIPPED | OUTPATIENT
Start: 2019-09-09 | End: 2019-10-28

## 2019-09-09 RX ORDER — FENTANYL CITRATE 50 UG/ML
50 INJECTION, SOLUTION INTRAMUSCULAR; INTRAVENOUS ONCE
Status: COMPLETED | OUTPATIENT
Start: 2019-09-09 | End: 2019-09-09

## 2019-09-09 RX ORDER — PROMETHAZINE HYDROCHLORIDE 25 MG/ML
12.5 INJECTION, SOLUTION INTRAMUSCULAR; INTRAVENOUS ONCE
Status: COMPLETED | OUTPATIENT
Start: 2019-09-09 | End: 2019-09-09

## 2019-09-09 RX ORDER — ALUMINA, MAGNESIA, AND SIMETHICONE 2400; 2400; 240 MG/30ML; MG/30ML; MG/30ML
15 SUSPENSION ORAL ONCE
Status: COMPLETED | OUTPATIENT
Start: 2019-09-09 | End: 2019-09-09

## 2019-09-09 RX ORDER — LIDOCAINE HYDROCHLORIDE 20 MG/ML
15 SOLUTION OROPHARYNGEAL ONCE
Status: COMPLETED | OUTPATIENT
Start: 2019-09-09 | End: 2019-09-09

## 2019-09-09 RX ORDER — ONDANSETRON 4 MG/1
4 TABLET, ORALLY DISINTEGRATING ORAL EVERY 8 HOURS PRN
Qty: 12 TABLET | Refills: 0 | Status: SHIPPED | OUTPATIENT
Start: 2019-09-09 | End: 2021-02-01

## 2019-09-09 RX ADMIN — LIDOCAINE HYDROCHLORIDE 15 ML: 20 SOLUTION ORAL; TOPICAL at 13:39

## 2019-09-09 RX ADMIN — MORPHINE SULFATE 4 MG: 4 INJECTION INTRAVENOUS at 09:21

## 2019-09-09 RX ADMIN — HYDROMORPHONE HYDROCHLORIDE 0.5 MG: 1 INJECTION, SOLUTION INTRAMUSCULAR; INTRAVENOUS; SUBCUTANEOUS at 13:39

## 2019-09-09 RX ADMIN — FENTANYL CITRATE 50 MCG: 0.05 INJECTION, SOLUTION INTRAMUSCULAR; INTRAVENOUS at 11:32

## 2019-09-09 RX ADMIN — ONDANSETRON 4 MG: 2 INJECTION INTRAMUSCULAR; INTRAVENOUS at 07:42

## 2019-09-09 RX ADMIN — ALUMINUM HYDROXIDE, MAGNESIUM HYDROXIDE, AND DIMETHICONE 15 ML: 400; 400; 40 SUSPENSION ORAL at 13:39

## 2019-09-09 RX ADMIN — SODIUM CHLORIDE 1000 ML: 9 INJECTION, SOLUTION INTRAVENOUS at 13:39

## 2019-09-09 RX ADMIN — IOPAMIDOL 99 ML: 612 INJECTION, SOLUTION INTRAVENOUS at 11:29

## 2019-09-09 RX ADMIN — SODIUM CHLORIDE 1000 ML: 9 INJECTION, SOLUTION INTRAVENOUS at 09:22

## 2019-09-09 RX ADMIN — DIATRIZOATE MEGLUMINE AND DIATRIZOATE SODIUM 15 ML: 660; 100 LIQUID ORAL; RECTAL at 09:22

## 2019-09-09 RX ADMIN — PROMETHAZINE HYDROCHLORIDE 12.5 MG: 25 INJECTION INTRAMUSCULAR; INTRAVENOUS at 13:39

## 2019-09-09 NOTE — DISCHARGE INSTRUCTIONS
Please call ASAP to arrange outpatient follow up with one of the following gastroenterologists:    [facility] GI Provider Dr. Manuel Reaves, Dr. CASIE Purdy, Dr. Michele Garcia, ASIA Mckeon    Office Location:  [address] [place] [address]    Office # [phone]    Or    [facility] GI Provider Dr. Lencho Fox and Dr. Heriberto Hall    Office Location:   [address] [place] [address]    Office # [phone]

## 2019-09-09 NOTE — ED PROVIDER NOTES
Subjective   Diarrhea x 2 weeks.  Multiple times per day.  She thought it was related to stress but last night she started developing  severe abdominal pain nausea vomiting.  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  She is also had gastric bypass the [place]-en-Y 9 to 10 years ago at the [facility]. No fever.        Abdominal Pain   Pain location:  Epigastric  Pain quality: cramping    Pain radiates to:  Does not radiate  Pain severity:  Moderate  Onset quality:  Sudden  Duration:  1 day  Timing:  Constant  Progression:  Waxing and waning  Chronicity:  New  Relieved by:  Nothing  Worsened by:  Eating and movement  Ineffective treatments:  None tried  Associated symptoms: chest pain, diarrhea, nausea and vomiting    Associated symptoms: no chills, no constipation, no cough, no dysuria, no fever, no hematuria, no shortness of breath and no sore throat        Review of Systems   Constitutional: Negative for chills, diaphoresis and fever.   HENT: Negative for congestion and sore throat.    Respiratory: Negative for cough, choking, chest tightness, shortness of breath and wheezing.    Cardiovascular: Positive for chest pain. Negative for leg swelling.   Gastrointestinal: Positive for abdominal pain, diarrhea, nausea and vomiting. Negative for abdominal distention, anal bleeding, blood in stool and constipation.   Genitourinary: Negative for difficulty urinating, dysuria, flank pain, frequency, hematuria and urgency.   All other systems reviewed and are negative.      Past Medical History:   Diagnosis Date   • Acute respiratory failure with hypoxia (CMS/HCC) 6/2/2018   • Chest pain 6/1/2018   • Chronic pain 6/1/2018   • Depression    • DVT (deep venous thrombosis) (CMS/HCC)     left arm, 2005 андрей   • Kidney stone     around early 2000s, has had some prior to that as well, passed all of them without surgery   • Obesity 6/1/2018   • Peptic ulceration    • Pneumonia     never had to be  hospitalized for it       Allergies   Allergen Reactions   • Coumadin [Warfarin Sodium] Anaphylaxis     Swelling of throat. LUE DVT in  when inpatient for unknown cause, did fine on heparin gtt, home on coumadin and anaphylaxis, completed and tolerated 6 months lovenox.   • Dilaudid [Hydromorphone Hcl] Other (See Comments)     Oxygen, blood pressure drops that takes several days to resolve, affects loc   • Cymbalta [Duloxetine Hcl] Other (See Comments)     Worsening depression, CNS issues   • Paxil [Paroxetine Hcl] Other (See Comments)     Didn't tolerate   • Prozac [Fluoxetine Hcl] Other (See Comments)     Didn't tolerate       Past Surgical History:   Procedure Laterality Date   • APPENDECTOMY      age 15, they removed appendix while doing oopherectomy   •  SECTION     • CHOLECYSTECTOMY N/A 2018    Procedure: CHOLECYSTECTOMY LAPAROSCOPIC;  Surgeon: Edmund BEE MD;  Location:  [facility] OR;  Service: General   • ENDOSCOPY N/A 6/3/2018    Procedure: ESOPHAGOGASTRODUODENOSCOPY;  Surgeon: Mark I Brunner, MD;  Location:  [facility];  Service: Gastroenterology   • GASTRECTOMY      [facility]   • RIGHT OOPHORECTOMY      torque   • TONSILLECTOMY      age 17       Family History   Problem Relation Age of Onset   • Atrial fibrillation Mother    • Heart failure Mother    • Diabetes Father    • Stroke Father    • No Known Problems Son    • Coronary artery disease Maternal Grandmother    • Valvular heart disease Maternal Grandmother    • Tuberculosis Maternal Grandfather    • Tuberculosis Paternal Grandfather    • Breast cancer Neg Hx    • Ovarian cancer Neg Hx    • Cancer Neg Hx        Social History     Socioeconomic History   • Marital status:      Spouse name: Not on file   • Number of children: Not on file   • Years of education: Not on file   • Highest education level: Not on file   Tobacco Use   • Smoking status: Former Smoker     Packs/day: 0.25     Types: Cigarettes   •  Smokeless tobacco: Never Used   • Tobacco comment: About a third a pack a day, quit 2/2018   Substance and Sexual Activity   • Alcohol use: Yes     Comment: about once a month 1-2 drinks at a dinner, no hx heavy use or abuse other than more social in college years   • Drug use: No   • Sexual activity: Defer   Social History Narrative    Mrs. Hayes is a 48 year old white  female. She lives in [facility]. They have two sons. 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 She does not have an advanced directive.           Objective   Physical Exam   Constitutional: She is oriented to person, place, and time. She appears well-developed and well-nourished. She appears ill. She appears distressed.   HENT:   Head: Normocephalic and atraumatic.   Right Ear: External ear normal.   Left Ear: External ear normal.   Nose: Nose normal.   Mouth/Throat: Oropharynx is clear and moist.   Eyes: Conjunctivae and EOM are normal. Pupils are equal, round, and reactive to light.   Neck: Normal range of motion. Neck supple.   Cardiovascular: Normal rate, regular rhythm, normal heart sounds and intact distal pulses.   Pulmonary/Chest: Effort normal and breath sounds normal.   Abdominal: Soft. Bowel sounds are normal. There is generalized tenderness.   Musculoskeletal: Normal range of motion.   Neurological: She is alert and oriented to person, place, and time.   Skin: Skin is warm and dry.   Psychiatric: She has a normal mood and affect. Her behavior is normal. Judgment and thought content normal.       Procedures           ED Course  ED Course as of Sep 09 1528   Mon Sep 09, 2019   1334 Reassuring labs and CAT scan.  Negative C. difficile but still culture sent.  Patient still in quite a bit of upper abdominal pain.  There is no chest pain there is no trouble breathing.  I will try GI cocktail she is okay with trying Dilaudid as well as some more fluid.  This helps we will be able  to send her home if not will probably need to admit for further evaluation.  [JM]   1516 Patient feels much better will aware the signs symptoms worse condition.  Primary care and GI follow-up.    Pt thankful and agreeable with tx poc. Well aware of the ss of worsening condition.    [JM]   1518 SHAN query complete. Treatment plan to include limited course of prescribed  controlled substance. Risks including addiction, benefits, and alternatives presented to patient.   Request number [id number]  [AT]      ED Course User Index  [AT] Ivonne Osborne  [JM] Gunnar Cook, ASIA        CT Abdomen Pelvis With Contrast   Preliminary Result   There is mild stranding seen surrounding the head and   proximal body of the pancreas in which mild inflammatory changes cannot   be excluded. Correlation to lab values is recommended. The remainder of   the abdomen and pelvis is grossly unremarkable. Small adenoma identified   on the left adrenal gland.       D:  09/09/2019   E:  09/09/2019              XR Chest 1 View   Preliminary Result   No acute cardiopulmonary disease.       D:  09/09/2019   E:  09/09/2019                          MDM    Final diagnoses:   Acute abdominal pain   Adenoma of left adrenal gland   Diarrhea, unspecified type   Nausea vomiting and diarrhea              Gunnar Cook APRN  09/09/19 1524       Gunnar Cook APRN  09/09/19 1528

## 2019-09-13 ENCOUNTER — OFFICE VISIT (OUTPATIENT)
Dept: FAMILY MEDICINE CLINIC | Facility: CLINIC | Age: 49
End: 2019-09-13

## 2019-09-13 VITALS
DIASTOLIC BLOOD PRESSURE: 84 MMHG | HEART RATE: 81 BPM | BODY MASS INDEX: 40.5 KG/M2 | WEIGHT: 252 LBS | SYSTOLIC BLOOD PRESSURE: 138 MMHG | HEIGHT: 66 IN | OXYGEN SATURATION: 99 %

## 2019-09-13 DIAGNOSIS — R15.9 INCONTINENCE OF FECES, UNSPECIFIED FECAL INCONTINENCE TYPE: ICD-10-CM

## 2019-09-13 DIAGNOSIS — R82.90 ABNORMAL URINALYSIS: ICD-10-CM

## 2019-09-13 DIAGNOSIS — F33.41 RECURRENT MAJOR DEPRESSIVE DISORDER, IN PARTIAL REMISSION (HCC): Chronic | ICD-10-CM

## 2019-09-13 DIAGNOSIS — G89.29 OTHER CHRONIC PAIN: Chronic | ICD-10-CM

## 2019-09-13 DIAGNOSIS — Z98.84 H/O GASTRIC BYPASS: ICD-10-CM

## 2019-09-13 DIAGNOSIS — R11.0 NAUSEA: ICD-10-CM

## 2019-09-13 DIAGNOSIS — R19.7 DIARRHEA, UNSPECIFIED TYPE: Primary | ICD-10-CM

## 2019-09-13 LAB
BILIRUB BLD-MCNC: NEGATIVE MG/DL
CAMPYLOBACTER STL CULT: NORMAL
CLARITY, POC: CLEAR
COLOR UR: YELLOW
E COLI SXT STL QL IA: NEGATIVE
GLUCOSE UR STRIP-MCNC: NEGATIVE MG/DL
KETONES UR QL: ABNORMAL
LEUKOCYTE EST, POC: ABNORMAL
Lab: NORMAL
Lab: NORMAL
NITRITE UR-MCNC: NEGATIVE MG/ML
PH UR: 6 [PH] (ref 5–8)
PROT UR STRIP-MCNC: ABNORMAL MG/DL
RBC # UR STRIP: ABNORMAL /UL
SALM + SHIG STL CULT: NORMAL
SP GR UR: 1.02 (ref 1–1.03)
UROBILINOGEN UR QL: NORMAL

## 2019-09-13 PROCEDURE — 81003 URINALYSIS AUTO W/O SCOPE: CPT | Performed by: PHYSICIAN ASSISTANT

## 2019-09-13 PROCEDURE — 87086 URINE CULTURE/COLONY COUNT: CPT | Performed by: PHYSICIAN ASSISTANT

## 2019-09-13 PROCEDURE — 99214 OFFICE O/P EST MOD 30 MIN: CPT | Performed by: PHYSICIAN ASSISTANT

## 2019-09-13 RX ORDER — CHOLESTYRAMINE LIGHT 4 G/5.7G
4 POWDER, FOR SUSPENSION ORAL 3 TIMES DAILY
Qty: 90 PACKET | Refills: 0 | Status: SHIPPED | OUTPATIENT
Start: 2019-09-13 | End: 2019-09-19

## 2019-09-13 NOTE — PROGRESS NOTES
Chief Complaint   Patient presents with   • Diarrhea     x 3 weeks - seen at [facility] ED    • Abdominal Cramping       HPI      Haylee Hayes is a 49 y.o. female who presents for Diarrhea (x 3 weeks - seen at [facility] ED ) and Abdominal Cramping    Patient presents today for follow-up after recent ED visit on 9/9.  Patient was seen in the ED for nausea, diarrhea and significant abdominal pain.  She reports that the diarrhea started 3 weeks ago and has been ongoing.  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  ED work-up was unremarkable.  The CT Abdo/pelvis did show some mild inflammation and stranding around the pancreas.  Her lipase was normal.  She was discharged once her pain was under control.  She reports that her abdominal pain has not returned.  However she continues to have ongoing diarrhea.  She reports that the diarrhea is a tan color and mostly water.  She denies any blood in her stool.  She has ongoing nausea without vomiting.  She reports 2 episodes of fecal incontinence where she woke up and had gone to the bathroom in the bed.  She is taking 4 Imodium a day and this is not helping much.  She states that she is going to the bathroom 10 times a day.  She is hydrating well and tolerating water.  She denies fever, chills, rash.  She has not started any new medications recently.  History of gastric bypass and cholecystectomy.    She reports stable mood on current medications.  She is currently taking gabapentin 300 mg twice daily for chronic back/hip pain and this has improved her pain.    Past Medical History:   Diagnosis Date   • Acute respiratory failure with hypoxia (CMS/HCC) 6/2/2018   • Chest pain 6/1/2018   • Chronic pain 6/1/2018   • Depression    • DVT (deep venous thrombosis) (CMS/HCC)     left arm, 2005 андрей   • Kidney stone     around early 2000s, has had some prior to that as well, passed all of them without surgery   • Obesity 6/1/2018   • Peptic ulceration    • Pneumonia      never had to be hospitalized for it       Past Surgical History:   Procedure Laterality Date   • APPENDECTOMY      age 15, they removed appendix while doing oopherectomy   •  SECTION     • CHOLECYSTECTOMY N/A 2018    Procedure: CHOLECYSTECTOMY LAPAROSCOPIC;  Surgeon: Edmund BEE MD;  Location: [facility] OR;  Service: General   • ENDOSCOPY N/A 6/3/2018    Procedure: ESOPHAGOGASTRODUODENOSCOPY;  Surgeon: Mark I Brunner, MD;  Location:  [facility];  Service: Gastroenterology   • GASTRECTOMY      [facility]   • RIGHT OOPHORECTOMY      torque   • TONSILLECTOMY      age 17       Family History   Problem Relation Age of Onset   • Atrial fibrillation Mother    • Heart failure Mother    • Diabetes Father    • Stroke Father    • No Known Problems Son    • Coronary artery disease Maternal Grandmother    • Valvular heart disease Maternal Grandmother    • Tuberculosis Maternal Grandfather    • Tuberculosis Paternal Grandfather    • Breast cancer Neg Hx    • Ovarian cancer Neg Hx    • Cancer Neg Hx        Social History     Socioeconomic History   • Marital status:      Spouse name: Not on file   • Number of children: Not on file   • Years of education: Not on file   • Highest education level: Not on file   Tobacco Use   • Smoking status: Former Smoker     Packs/day: 0.25     Types: Cigarettes   • Smokeless tobacco: Never Used   • Tobacco comment: About a third a pack a day, quit 2018   Substance and Sexual Activity   • Alcohol use: Yes     Comment: about once a month 1-2 drinks at a dinner, no hx heavy use or abuse other than more social in college years   • Drug use: No   • Sexual activity: Defer   Social History Narrative    Mrs. Hayes is a 48 year old white  female. She lives in [facility]. They have two sons. 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 She does not have an advanced directive.       Allergies   Allergen  "Reactions   • Coumadin [Warfarin Sodium] Anaphylaxis     Swelling of throat. TAWNY DVT in 2005 when inpatient for unknown cause, did fine on heparin gtt, home on coumadin and anaphylaxis, completed and tolerated 6 months lovenox.   • Dilaudid [Hydromorphone Hcl] Other (See Comments)     Oxygen, blood pressure drops that takes several days to resolve, affects loc   • Cymbalta [Duloxetine Hcl] Other (See Comments)     Worsening depression, CNS issues   • Paxil [Paroxetine Hcl] Other (See Comments)     Didn't tolerate   • Prozac [Fluoxetine Hcl] Other (See Comments)     Didn't tolerate       ROS    Review of Systems   Constitutional: Positive for fatigue. Negative for chills, diaphoresis and fever.   HENT: Negative for congestion, postnasal drip and rhinorrhea.    Respiratory: Negative for cough, shortness of breath and wheezing.    Cardiovascular: Negative for chest pain and leg swelling.   Gastrointestinal: Positive for diarrhea and nausea. Negative for abdominal pain, blood in stool, rectal pain, vomiting and indigestion.   Musculoskeletal: Positive for arthralgias.   Neurological: Negative for dizziness and headache.   Psychiatric/Behavioral: Positive for sleep disturbance and stress. Negative for self-injury, suicidal ideas and depressed mood. The patient is not nervous/anxious.        Vitals:    09/13/19 1004   BP: 138/84   Pulse: 81   SpO2: 99%   Weight: 114 kg (252 lb)   Height: 167.6 cm (66\")   PainSc:   2   PainLoc: Abdomen       Current Outpatient Medications on File Prior to Visit   Medication Sig Dispense Refill   • albuterol sulfate HFA (PROAIR HFA) 108 (90 Base) MCG/ACT inhaler Inhale 2 puffs Every 4 (Four) Hours As Needed for Wheezing. 18 g 0   • buPROPion XL (WELLBUTRIN XL) 150 MG 24 hr tablet Take 2 tablets by mouth Daily. 14 tablet 0   • citalopram (CeleXA) 40 MG tablet Take 1 tablet by mouth Daily. 90 tablet 1   • diclofenac (VOLTAREN) 1 % gel gel Apply 4 g topically to the appropriate area as " directed 4 (Four) Times a Day As Needed (prn for pain). 60 tube 0   • gabapentin (NEURONTIN) 300 MG capsule TAKE 1 CAPSULE BY MOUTH TWICE A DAY 60 capsule 0   • HYDROcodone-acetaminophen (NORCO) 5-325 MG per tablet Take 1-2 tablets by mouth Every 6 (Six) Hours As Needed for Severe Pain . 12 tablet 0   • ondansetron ODT (ZOFRAN-ODT) 4 MG disintegrating tablet Take 1 tablet by mouth Every 8 (Eight) Hours As Needed for Vomiting. 12 tablet 0   • pantoprazole (PROTONIX) 40 MG EC tablet Take 1 tablet by mouth 2 (Two) Times a Day Before Meals. 180 tablet 1   • promethazine (PHENERGAN) 25 MG tablet Take 1 tablet by mouth Every 6 (Six) Hours As Needed for Nausea or Vomiting. 12 tablet 0     No current facility-administered medications on file prior to visit.        Results for orders placed or performed during the hospital encounter of 09/09/19   Fecal Leukocytes - Stool, Per Rectum   Result Value Ref Range    Fecal Leukocytes No WBC's Seen No WBC's Seen   Stool Culture - Stool, Per Rectum   Result Value Ref Range    Salmonella/Shigella Screen Final report     Result 1 Comment     E coli, Shiga toxin Assay Negative Negative   Clostridium Difficile Toxin, PCR - Stool, Per Rectum   Result Value Ref Range    C. Difficile Toxins by PCR Not Detected Not Detected   Comprehensive Metabolic Panel   Result Value Ref Range    Glucose 94 65 - 99 mg/dL    BUN 12 6 - 20 mg/dL    Creatinine 0.75 0.57 - 1.00 mg/dL    Sodium 143 136 - 145 mmol/L    Potassium 3.8 3.5 - 5.2 mmol/L    Chloride 109 (H) 98 - 107 mmol/L    CO2 22.0 22.0 - 29.0 mmol/L    Calcium 8.5 (L) 8.6 - 10.5 mg/dL    Total Protein 6.8 6.0 - 8.5 g/dL    Albumin 3.90 3.50 - 5.20 g/dL    ALT (SGPT) 18 1 - 33 U/L    AST (SGOT) 31 1 - 32 U/L    Alkaline Phosphatase 99 39 - 117 U/L    Total Bilirubin 0.2 0.2 - 1.2 mg/dL    eGFR Non African Amer 82 >60 mL/min/1.73    Globulin 2.9 gm/dL    A/G Ratio 1.3 g/dL    BUN/Creatinine Ratio 16.0 7.0 - 25.0    Anion Gap 12.0 5.0 - 15.0 mmol/L    Lipase   Result Value Ref Range    Lipase 27 13 - 60 U/L   Troponin   Result Value Ref Range    Troponin T <0.010 0.000 - 0.030 ng/mL   Urinalysis With Microscopic If Indicated (No Culture) - Urine, Clean Catch   Result Value Ref Range    Color, UA Yellow Yellow, Straw    Appearance, UA Clear Clear    pH, UA 6.5 5.0 - 8.0    Specific Gravity, UA 1.018 1.001 - 1.030    Glucose, UA Negative Negative    Ketones, UA Negative Negative    Bilirubin, UA Negative Negative    Blood, UA Trace (A) Negative    Protein, UA Negative Negative    Leuk Esterase, UA Large (3+) (A) Negative    Nitrite, UA Negative Negative    Urobilinogen, UA 0.2 E.U./dL 0.2 - 1.0 E.U./dL   CBC Auto Differential   Result Value Ref Range    WBC 8.40 3.40 - 10.80 10*3/mm3    RBC 4.90 3.77 - 5.28 10*6/mm3    Hemoglobin 14.2 12.0 - 15.9 g/dL    Hematocrit 41.5 34.0 - 46.6 %    MCV 84.7 79.0 - 97.0 fL    MCH 29.0 26.6 - 33.0 pg    MCHC 34.2 31.5 - 35.7 g/dL    RDW 12.2 (L) 12.3 - 15.4 %    RDW-SD 37.2 37.0 - 54.0 fl    MPV 10.0 6.0 - 12.0 fL    Platelets 233 140 - 450 10*3/mm3    Neutrophil % 57.1 42.7 - 76.0 %    Lymphocyte % 32.9 19.6 - 45.3 %    Monocyte % 6.5 5.0 - 12.0 %    Eosinophil % 2.4 0.3 - 6.2 %    Basophil % 0.4 0.0 - 1.5 %    Immature Grans % 0.7 (H) 0.0 - 0.5 %    Neutrophils, Absolute 4.80 1.70 - 7.00 10*3/mm3    Lymphocytes, Absolute 2.76 0.70 - 3.10 10*3/mm3    Monocytes, Absolute 0.55 0.10 - 0.90 10*3/mm3    Eosinophils, Absolute 0.20 0.00 - 0.40 10*3/mm3    Basophils, Absolute 0.03 0.00 - 0.20 10*3/mm3    Immature Grans, Absolute 0.06 (H) 0.00 - 0.05 10*3/mm3    nRBC 0.0 0.0 - 0.2 /100 WBC   Urinalysis, Microscopic Only - Urine, Clean Catch   Result Value Ref Range    RBC, UA 3-6 (A) None Seen, 0-2 /HPF    WBC, UA 13-20 (A) None Seen, 0-2 /HPF    Bacteria, UA None Seen None Seen, Trace /HPF    Squamous Epithelial Cells, UA 3-6 (A) None Seen, 0-2 /HPF    Hyaline Casts, UA 0-6 0 - 6 /LPF    Methodology Automated Microscopy    POCT  Pregnancy, Urine   Result Value Ref Range    HCG, Urine, QL Negative Negative    Lot Number [id number]     Internal Positive Control Positive     Internal Negative Control Negative    Light Blue Top   Result Value Ref Range    Extra Tube hold for add-on    Green Top (Gel)   Result Value Ref Range    Extra Tube Hold for add-ons.    Lavender Top   Result Value Ref Range    Extra Tube hold for add-on    Gold Top - SST   Result Value Ref Range    Extra Tube Hold for add-ons.        PE    Physical Exam   Constitutional: She appears well-developed and well-nourished. She is active and cooperative. No distress.   HENT:   Head: Normocephalic and atraumatic.   Eyes: EOM are normal.   Neck: Normal range of motion.   Cardiovascular: Normal rate, regular rhythm and normal heart sounds.   Pulmonary/Chest: Effort normal and breath sounds normal.   Musculoskeletal: Normal range of motion. She exhibits no edema.   Neurological: She is alert.   Skin: Skin is warm. She is not diaphoretic. No erythema.   Psychiatric: She has a normal mood and affect. Her speech is normal and behavior is normal. Judgment and thought content normal. She is not actively hallucinating. She is attentive.   Vitals reviewed.       A/P    Haylee was seen today for diarrhea and abdominal cramping.    Diagnoses and all orders for this visit:    Diarrhea, unspecified type  Incontinence of feces, unspecified fecal incontinence type  Nausea  H/O gastric bypass  -seen in ER on 09/09 for diarrhea, abdominal pain and nausea  -labs were unremarkable, stool cultures unremarkable  -CT abdo/pelvis showed mild stranding/inflammation around pancreas, normal lipase  -she reports resolution of abdominal pain but has ongoing diarrhea  -has had tan, watery diarrhea x3 weeks, approximately 10 BMs a day  -taking 4 immodium daily without much improvement in diarrhea  -she reports 2 episodes of waking up with fecal incontinence  -tolerating water well, aware she needs to hydrate  given diarrhea  -recommend bland diet and she is already following this  -will trial cholestyramine and refer to GI for evaluation/treatment  -recent history of cholecystectomy  -     cholestyramine light (PREVALITE) 4 g packet; Take 1 packet by mouth 3 (Three) Times a Day for 30 days.  -     Ambulatory Referral to Gastroenterology      Abnormal urinalysis  -abnormal UA in ER, will repeat with culture  -     POC Urinalysis Dipstick, Automated  -     Urine Culture - Urine, Urine, Random Void; Future    Recurrent major depressive disorder, in partial remission (CMS/HCC)  -patient reports stable mood with current medications  -doing better than at previous appointments    Other chronic pain  -mostly in hip and back  -started on gabapentin 300 mg BID and reports that this is helping with pain  -will obtain contract, UDS and shan  -was recently given dilaudid, fentanyl and norco at ER for abdominal pain, notes reviewed from ER visit confirm this     This patient is on a controlled substance which improves symptoms/quality of life and is aware of the risks, benefits and possible side-effects current treatment. The patient denies any medication side-effects at this time. A controlled substance agreement will be obtained or is currently on file. We reviewed required monitoring for controlled substances including but not limited to quarterly follow-up visits, annual depression screening, and urine drug screens to which the patient is agreeable. A SHAN report has been or shortly will be reviewed. There are no signs of deviation or misuse.    Plan of care reviewed with patient at the conclusion of today's visit. Education was provided regarding diagnosis, management and any prescribed or recommended OTC medications.  Patient verbalizes understanding of and agreement with management plan.    Return in about 3 months (around 12/13/2019) for Annual physical.     Jennyfer Mauricio PA-C

## 2019-09-14 LAB — BACTERIA SPEC AEROBE CULT: NORMAL

## 2019-09-15 LAB
O+P SPEC MICRO: NORMAL
O+P STL TRI STN: NORMAL

## 2019-09-19 ENCOUNTER — TELEPHONE (OUTPATIENT)
Dept: FAMILY MEDICINE CLINIC | Facility: CLINIC | Age: 49
End: 2019-09-19

## 2019-09-19 ENCOUNTER — APPOINTMENT (OUTPATIENT)
Dept: CT IMAGING | Facility: HOSPITAL | Age: 49
End: 2019-09-19

## 2019-09-19 ENCOUNTER — HOSPITAL ENCOUNTER (EMERGENCY)
Facility: HOSPITAL | Age: 49
Discharge: HOME | End: 2019-09-19
Attending: EMERGENCY MEDICINE | Admitting: EMERGENCY MEDICINE

## 2019-09-19 VITALS
OXYGEN SATURATION: 97 % | TEMPERATURE: 98.2 F | BODY MASS INDEX: 40.18 KG/M2 | HEIGHT: 66 IN | WEIGHT: 250 LBS | SYSTOLIC BLOOD PRESSURE: 138 MMHG | DIASTOLIC BLOOD PRESSURE: 68 MMHG | HEART RATE: 68 BPM | RESPIRATION RATE: 16 BRPM

## 2019-09-19 DIAGNOSIS — R10.10 PAIN OF UPPER ABDOMEN: Primary | ICD-10-CM

## 2019-09-19 LAB
ALBUMIN SERPL-MCNC: 4.1 G/DL (ref 3.5–5.2)
ALBUMIN/GLOB SERPL: 1.3 G/DL
ALP SERPL-CCNC: 105 U/L (ref 39–117)
ALT SERPL W P-5'-P-CCNC: 21 U/L (ref 1–33)
ANION GAP SERPL CALCULATED.3IONS-SCNC: 12 MMOL/L (ref 5–15)
AST SERPL-CCNC: 23 U/L (ref 1–32)
B-HCG UR QL: NEGATIVE
BASOPHILS # BLD AUTO: 0.03 10*3/MM3 (ref 0–0.2)
BASOPHILS NFR BLD AUTO: 0.4 % (ref 0–1.5)
BILIRUB SERPL-MCNC: 0.2 MG/DL (ref 0.2–1.2)
BILIRUB UR QL STRIP: NEGATIVE
BUN BLD-MCNC: 14 MG/DL (ref 6–20)
BUN/CREAT SERPL: 18.2 (ref 7–25)
CALCIUM SPEC-SCNC: 8.4 MG/DL (ref 8.6–10.5)
CHLORIDE SERPL-SCNC: 107 MMOL/L (ref 98–107)
CLARITY UR: CLEAR
CO2 SERPL-SCNC: 24 MMOL/L (ref 22–29)
COLOR UR: YELLOW
CREAT BLD-MCNC: 0.77 MG/DL (ref 0.57–1)
DEPRECATED RDW RBC AUTO: 37.3 FL (ref 37–54)
EOSINOPHIL # BLD AUTO: 0.11 10*3/MM3 (ref 0–0.4)
EOSINOPHIL NFR BLD AUTO: 1.5 % (ref 0.3–6.2)
ERYTHROCYTE [DISTWIDTH] IN BLOOD BY AUTOMATED COUNT: 12.1 % (ref 12.3–15.4)
GFR SERPL CREATININE-BSD FRML MDRD: 79 ML/MIN/1.73
GLOBULIN UR ELPH-MCNC: 3.2 GM/DL
GLUCOSE BLD-MCNC: 100 MG/DL (ref 65–99)
GLUCOSE UR STRIP-MCNC: NEGATIVE MG/DL
HCT VFR BLD AUTO: 42.7 % (ref 34–46.6)
HGB BLD-MCNC: 14.6 G/DL (ref 12–15.9)
HGB UR QL STRIP.AUTO: NEGATIVE
HOLD SPECIMEN: NORMAL
HOLD SPECIMEN: NORMAL
IMM GRANULOCYTES # BLD AUTO: 0.02 10*3/MM3 (ref 0–0.05)
IMM GRANULOCYTES NFR BLD AUTO: 0.3 % (ref 0–0.5)
INTERNAL NEGATIVE CONTROL: NEGATIVE
INTERNAL POSITIVE CONTROL: POSITIVE
KETONES UR QL STRIP: NEGATIVE
LEUKOCYTE ESTERASE UR QL STRIP.AUTO: NEGATIVE
LIPASE SERPL-CCNC: 20 U/L (ref 13–60)
LYMPHOCYTES # BLD AUTO: 2.66 10*3/MM3 (ref 0.7–3.1)
LYMPHOCYTES NFR BLD AUTO: 37.2 % (ref 19.6–45.3)
Lab: NORMAL
MCH RBC QN AUTO: 29.1 PG (ref 26.6–33)
MCHC RBC AUTO-ENTMCNC: 34.2 G/DL (ref 31.5–35.7)
MCV RBC AUTO: 85.2 FL (ref 79–97)
MONOCYTES # BLD AUTO: 0.55 10*3/MM3 (ref 0.1–0.9)
MONOCYTES NFR BLD AUTO: 7.7 % (ref 5–12)
NEUTROPHILS # BLD AUTO: 3.78 10*3/MM3 (ref 1.7–7)
NEUTROPHILS NFR BLD AUTO: 52.9 % (ref 42.7–76)
NITRITE UR QL STRIP: NEGATIVE
NRBC BLD AUTO-RTO: 0 /100 WBC (ref 0–0.2)
PH UR STRIP.AUTO: 5.5 [PH] (ref 5–8)
PLATELET # BLD AUTO: 256 10*3/MM3 (ref 140–450)
PMV BLD AUTO: 9.8 FL (ref 6–12)
POTASSIUM BLD-SCNC: 3.6 MMOL/L (ref 3.5–5.2)
PROT SERPL-MCNC: 7.3 G/DL (ref 6–8.5)
PROT UR QL STRIP: NEGATIVE
RBC # BLD AUTO: 5.01 10*6/MM3 (ref 3.77–5.28)
SODIUM BLD-SCNC: 143 MMOL/L (ref 136–145)
SP GR UR STRIP: >=1.03 (ref 1–1.03)
UROBILINOGEN UR QL STRIP: NORMAL
WBC NRBC COR # BLD: 7.15 10*3/MM3 (ref 3.4–10.8)
WHOLE BLOOD HOLD SPECIMEN: NORMAL
WHOLE BLOOD HOLD SPECIMEN: NORMAL

## 2019-09-19 PROCEDURE — 96372 THER/PROPH/DIAG INJ SC/IM: CPT

## 2019-09-19 PROCEDURE — 25010000002 DICYCLOMINE PER 20 MG: Performed by: NURSE PRACTITIONER

## 2019-09-19 PROCEDURE — 80053 COMPREHEN METABOLIC PANEL: CPT

## 2019-09-19 PROCEDURE — 25010000002 IOPAMIDOL 61 % SOLUTION: Performed by: EMERGENCY MEDICINE

## 2019-09-19 PROCEDURE — 96374 THER/PROPH/DIAG INJ IV PUSH: CPT

## 2019-09-19 PROCEDURE — 74177 CT ABD & PELVIS W/CONTRAST: CPT

## 2019-09-19 PROCEDURE — 99284 EMERGENCY DEPT VISIT MOD MDM: CPT

## 2019-09-19 PROCEDURE — 83690 ASSAY OF LIPASE: CPT

## 2019-09-19 PROCEDURE — 25010000002 ONDANSETRON PER 1 MG: Performed by: NURSE PRACTITIONER

## 2019-09-19 PROCEDURE — 81025 URINE PREGNANCY TEST: CPT | Performed by: EMERGENCY MEDICINE

## 2019-09-19 PROCEDURE — 85025 COMPLETE CBC W/AUTO DIFF WBC: CPT

## 2019-09-19 PROCEDURE — 81003 URINALYSIS AUTO W/O SCOPE: CPT | Performed by: EMERGENCY MEDICINE

## 2019-09-19 RX ORDER — LIDOCAINE HYDROCHLORIDE 20 MG/ML
15 SOLUTION OROPHARYNGEAL ONCE
Status: COMPLETED | OUTPATIENT
Start: 2019-09-19 | End: 2019-09-19

## 2019-09-19 RX ORDER — DICYCLOMINE HYDROCHLORIDE 10 MG/ML
20 INJECTION INTRAMUSCULAR ONCE
Status: COMPLETED | OUTPATIENT
Start: 2019-09-19 | End: 2019-09-19

## 2019-09-19 RX ORDER — SODIUM CHLORIDE 0.9 % (FLUSH) 0.9 %
10 SYRINGE (ML) INJECTION AS NEEDED
Status: DISCONTINUED | OUTPATIENT
Start: 2019-09-19 | End: 2019-09-20 | Stop reason: HOSPADM

## 2019-09-19 RX ORDER — ONDANSETRON 2 MG/ML
4 INJECTION INTRAMUSCULAR; INTRAVENOUS ONCE
Status: COMPLETED | OUTPATIENT
Start: 2019-09-19 | End: 2019-09-19

## 2019-09-19 RX ORDER — ALUMINA, MAGNESIA, AND SIMETHICONE 2400; 2400; 240 MG/30ML; MG/30ML; MG/30ML
15 SUSPENSION ORAL ONCE
Status: COMPLETED | OUTPATIENT
Start: 2019-09-19 | End: 2019-09-19

## 2019-09-19 RX ADMIN — LIDOCAINE HYDROCHLORIDE 15 ML: 20 SOLUTION ORAL; TOPICAL at 23:29

## 2019-09-19 RX ADMIN — SODIUM CHLORIDE 1000 ML: 9 INJECTION, SOLUTION INTRAVENOUS at 21:12

## 2019-09-19 RX ADMIN — DICYCLOMINE HYDROCHLORIDE 20 MG: 20 INJECTION, SOLUTION INTRAMUSCULAR at 21:14

## 2019-09-19 RX ADMIN — ALUMINUM HYDROXIDE, MAGNESIUM HYDROXIDE, AND DIMETHICONE 15 ML: 400; 400; 40 SUSPENSION ORAL at 23:29

## 2019-09-19 RX ADMIN — IOPAMIDOL 95 ML: 612 INJECTION, SOLUTION INTRAVENOUS at 21:51

## 2019-09-19 RX ADMIN — ONDANSETRON 4 MG: 2 INJECTION INTRAMUSCULAR; INTRAVENOUS at 21:14

## 2019-09-19 NOTE — TELEPHONE ENCOUNTER
Stop the cholestyramine if she hasn't already.  Is she able to hold down water and bland food?  If she is dehydrated, then ER is going to be best place so that they can give her IV hydration (our office and [facility] can't do that).

## 2019-09-19 NOTE — TELEPHONE ENCOUNTER
Pt wanted to see GI but they are not available until Monday. Pt has had diarrhea for 3 weeks. She is dehydrated, and water just goes thru her. The cholestyramine makes her vomit. And she is unable to work.   Please advise.

## 2019-09-19 NOTE — TELEPHONE ENCOUNTER
Patient has already stopped taking the cholestyramine. She is not vomiting since she has stopped this. She did say that she is able to hold down a bland food diet and water but does have diarhea still. I advised her further that if she is dehydrated its best recommended to go to the ER for IV fluids. She was not too concerned with this but would like recommendations for any OTC meds or prescriptions for the diarhea.

## 2019-09-19 NOTE — TELEPHONE ENCOUNTER
Immodium would be the only thing I would recommend for diarrhea and patient has already tried this without success.  Stay hydrated, continue to eat bland diet.

## 2019-09-20 NOTE — ED PROVIDER NOTES
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  Patient has had nonstop diarrhea for the past 3 weeks.  She was seen here in the ED on 9/9/2019 after suddenly developing severe upper abdominal pain and nausea/vomiting as well.  She had a reassuring workup, including labs and CT imaging.  She was sent home with a plan to follow up with outpatient gastroenterology.  While she did get in contact with GI, she laments her scheduled appointment isn't until next week.  In the meantime, her pain, nausea, vomiting, and diarrhea have not ceased.  She feels like she hasn't been able to keep any fluids down and is worried she might be dehydrated.  She states she was told to go to the ED with these continued symptoms for IV rehydration and possible admission for scoping.  On exam, patient endorses severe nausea and 6/10 epigastric pain.  No associated fevers, chills, chest pain or shortness of breath.  No urinary symptoms.  Past medical history includes deep vein thrombosis, nephrolithiasis, and peptic ulcer disease.  Past surgical history includes appendectomy, caesarean section, cholecystectomy, right oophorectomy, and ricky-en-y gastric bypass.        History provided by:  Patient  Abdominal Pain   Pain location:  Epigastric  Pain severity:  Moderate (\"6/10\")  Onset quality:  Sudden  Duration:  10 days  Timing:  Constant  Progression:  Unchanged  Associated symptoms: diarrhea, nausea and vomiting    Associated symptoms: no chest pain, no chills, no cough, no dysuria, no fever, no shortness of breath and no sore throat    Risk factors: multiple surgeries and obesity    Risk factors: not elderly        Review of Systems   Constitutional: Negative for chills and fever.   HENT: Negative for congestion, rhinorrhea and sore throat.    Respiratory: Negative for cough and shortness of breath.    Cardiovascular: Negative for chest pain. "   Gastrointestinal: Positive for abdominal pain, diarrhea, nausea and vomiting.   Genitourinary: Negative.  Negative for dysuria.   All other systems reviewed and are negative.      Past Medical History:   Diagnosis Date   • Acute respiratory failure with hypoxia (CMS/HCC) 2018   • Chest pain 2018   • Chronic pain 2018   • Depression    • DVT (deep venous thrombosis) (CMS/[facility])     left arm,  андрей   • Kidney stone     around early , has had some prior to that as well, passed all of them without surgery   • Obesity 2018   • Peptic ulceration    • Pneumonia     never had to be hospitalized for it       Allergies   Allergen Reactions   • Coumadin [Warfarin Sodium] Anaphylaxis     Swelling of throat. LUE DVT in  when inpatient for unknown cause, did fine on heparin gtt, home on coumadin and anaphylaxis, completed and tolerated 6 months lovenox.   • Cymbalta [Duloxetine Hcl] Other (See Comments)     Worsening depression, CNS issues   • Paxil [Paroxetine Hcl] Other (See Comments)     Didn't tolerate   • Prozac [Fluoxetine Hcl] Other (See Comments)     Didn't tolerate       Past Surgical History:   Procedure Laterality Date   • APPENDECTOMY      age 15, they removed appendix while doing oopherectomy   •  SECTION     • CHOLECYSTECTOMY N/A 2018    Procedure: CHOLECYSTECTOMY LAPAROSCOPIC;  Surgeon: Edmund BEE MD;  Location:  [facility] OR;  Service: General   • ENDOSCOPY N/A 6/3/2018    Procedure: ESOPHAGOGASTRODUODENOSCOPY;  Surgeon: Mark I Brunner, MD;  Location:  [facility];  Service: Gastroenterology   • GASTRECTOMY      [facility]   • RIGHT OOPHORECTOMY      torque   • TONSILLECTOMY      age 17       Family History   Problem Relation Age of Onset   • Atrial fibrillation Mother    • Heart failure Mother    • Diabetes Father    • Stroke Father    • No Known Problems Son    • Coronary artery disease Maternal Grandmother    • Valvular heart disease Maternal  Grandmother    • Tuberculosis Maternal Grandfather    • Tuberculosis Paternal Grandfather    • Breast cancer Neg Hx    • Ovarian cancer Neg Hx    • Cancer Neg Hx        Social History     Socioeconomic History   • Marital status:      Spouse name: Not on file   • Number of children: Not on file   • Years of education: Not on file   • Highest education level: Not on file   Tobacco Use   • Smoking status: Former Smoker     Packs/day: 0.25     Types: Cigarettes   • Smokeless tobacco: Never Used   • Tobacco comment: About a third a pack a day, quit 2/2018   Substance and Sexual Activity   • Alcohol use: Yes     Comment: about once a month 1-2 drinks at a dinner, no hx heavy use or abuse other than more social in college years   • Drug use: No   • Sexual activity: Defer   Social History Narrative    Mrs. Haeys is a 48 year old white  female. She lives in [place] [facility]. They have two sons. 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 She does not have an advanced directive.         Objective   Physical Exam   Constitutional: She is oriented to person, place, and time. She appears well-developed and well-nourished. She is cooperative.  Non-toxic appearance. She appears ill.   Pt is holding her abdomen at this time.    HENT:   Head: Normocephalic and atraumatic.   Mouth/Throat: Oropharynx is clear and moist.   Eyes: Conjunctivae, EOM and lids are normal. Pupils are equal, round, and reactive to light.   Neck: Trachea normal, normal range of motion and full passive range of motion without pain.   Cardiovascular: Normal rate, regular rhythm, normal heart sounds, intact distal pulses and normal pulses.   Pulmonary/Chest: Effort normal and breath sounds normal. No respiratory distress. She has no decreased breath sounds. She has no wheezes. She has no rhonchi. She has no rales.   Abdominal: Soft. Normal appearance and bowel sounds are normal. There is tenderness  in the right upper quadrant, epigastric area and left upper quadrant.   Musculoskeletal: Normal range of motion.   Neurological: She is alert and oriented to person, place, and time. She has normal strength. No cranial nerve deficit.   Skin: Skin is warm, dry and intact. No rash noted.   Psychiatric: She has a normal mood and affect. Her speech is normal and behavior is normal.   Nursing note and vitals reviewed.      Procedures         ED Course  ED Course as of Sep 19 2234   Thu Sep 19, 2019   2233 Pt is advised of her results at this time. Pt will be dc home. Pt to f/u with GI as planned on Monday. Pt to return to the ER as needed.  Pt agrees and verb understanding.    [KG]      ED Course User Index  [KG] Chante Vega, ASIA       Recent Results (from the past 24 hour(s))   Comprehensive Metabolic Panel    Collection Time: 09/19/19  6:04 PM   Result Value Ref Range    Glucose 100 (H) 65 - 99 mg/dL    BUN 14 6 - 20 mg/dL    Creatinine 0.77 0.57 - 1.00 mg/dL    Sodium 143 136 - 145 mmol/L    Potassium 3.6 3.5 - 5.2 mmol/L    Chloride 107 98 - 107 mmol/L    CO2 24.0 22.0 - 29.0 mmol/L    Calcium 8.4 (L) 8.6 - 10.5 mg/dL    Total Protein 7.3 6.0 - 8.5 g/dL    Albumin 4.10 3.50 - 5.20 g/dL    ALT (SGPT) 21 1 - 33 U/L    AST (SGOT) 23 1 - 32 U/L    Alkaline Phosphatase 105 39 - 117 U/L    Total Bilirubin 0.2 0.2 - 1.2 mg/dL    eGFR Non African Amer 79 >60 mL/min/1.73    Globulin 3.2 gm/dL    A/G Ratio 1.3 g/dL    BUN/Creatinine Ratio 18.2 7.0 - 25.0    Anion Gap 12.0 5.0 - 15.0 mmol/L   Lipase    Collection Time: 09/19/19  6:04 PM   Result Value Ref Range    Lipase 20 13 - 60 U/L   Light Blue Top    Collection Time: 09/19/19  6:04 PM   Result Value Ref Range    Extra Tube hold for add-on    Green Top (Gel)    Collection Time: 09/19/19  6:04 PM   Result Value Ref Range    Extra Tube Hold for add-ons.    Lavender Top    Collection Time: 09/19/19  6:04 PM   Result Value Ref Range    Extra Tube hold for add-on    Gold  Top - SST    Collection Time: 09/19/19  6:04 PM   Result Value Ref Range    Extra Tube Hold for add-ons.    CBC Auto Differential    Collection Time: 09/19/19  6:04 PM   Result Value Ref Range    WBC 7.15 3.40 - 10.80 10*3/mm3    RBC 5.01 3.77 - 5.28 10*6/mm3    Hemoglobin 14.6 12.0 - 15.9 g/dL    Hematocrit 42.7 34.0 - 46.6 %    MCV 85.2 79.0 - 97.0 fL    MCH 29.1 26.6 - 33.0 pg    MCHC 34.2 31.5 - 35.7 g/dL    RDW 12.1 (L) 12.3 - 15.4 %    RDW-SD 37.3 37.0 - 54.0 fl    MPV 9.8 6.0 - 12.0 fL    Platelets 256 140 - 450 10*3/mm3    Neutrophil % 52.9 42.7 - 76.0 %    Lymphocyte % 37.2 19.6 - 45.3 %    Monocyte % 7.7 5.0 - 12.0 %    Eosinophil % 1.5 0.3 - 6.2 %    Basophil % 0.4 0.0 - 1.5 %    Immature Grans % 0.3 0.0 - 0.5 %    Neutrophils, Absolute 3.78 1.70 - 7.00 10*3/mm3    Lymphocytes, Absolute 2.66 0.70 - 3.10 10*3/mm3    Monocytes, Absolute 0.55 0.10 - 0.90 10*3/mm3    Eosinophils, Absolute 0.11 0.00 - 0.40 10*3/mm3    Basophils, Absolute 0.03 0.00 - 0.20 10*3/mm3    Immature Grans, Absolute 0.02 0.00 - 0.05 10*3/mm3    nRBC 0.0 0.0 - 0.2 /100 WBC   Urinalysis With Microscopic If Indicated (No Culture) - Urine, Clean Catch    Collection Time: 09/19/19  9:19 PM   Result Value Ref Range    Color, UA Yellow Yellow, Straw    Appearance, UA Clear Clear    pH, UA 5.5 5.0 - 8.0    Specific Gravity, UA >=1.030 1.001 - 1.030    Glucose, UA Negative Negative    Ketones, UA Negative Negative    Bilirubin, UA Negative Negative    Blood, UA Negative Negative    Protein, UA Negative Negative    Leuk Esterase, UA Negative Negative    Nitrite, UA Negative Negative    Urobilinogen, UA 0.2 E.U./dL 0.2 - 1.0 E.U./dL   POCT pregnancy, urine    Collection Time: 09/19/19  9:27 PM   Result Value Ref Range    HCG, Urine, QL Negative Negative    Lot Number [id number]     Internal Positive Control Positive     Internal Negative Control Negative      Note: In addition to lab results from this visit, the labs listed above may include labs  "taken at another facility or during a different encounter within the last 24 hours. Please correlate lab times with ED admission and discharge times for further clarification of the services performed during this visit.    CT Abdomen Pelvis With Contrast   Final Result      1. No acute abdominal or pelvic findings.   2. Postsurgical changes from gastric bypass and cholecystectomy.   3. Appendix not clearly visualized. No pericecal inflammation.   4. IUD.   5. Stable 3 cm left adrenal mass, possibly a benign adenoma. This can be further characterized with a multiphase MRI of the abdomen on a nonemergent outpatient basis if clinically warranted.               Signer Name: Renan Frazier MD    Signed: 9/19/2019 10:08 PM    Workstation Name: [id number]     [facility]        Vitals:    09/19/19 1756 09/19/19 2120 09/19/19 2221   BP: 120/71 (S) 115/88 145/66   Pulse: 82 58 63   Resp: 16     Temp: 98.6 °F (37 °C)     SpO2: 99% 95% 97%   Weight: 113 kg (250 lb)     Height: 167.6 cm (66\")       Medications   sodium chloride 0.9 % flush 10 mL (not administered)   sodium chloride 0.9 % flush 10 mL (not administered)   aluminum-magnesium hydroxide-simethicone (MAALOX MAX) 400-400-40 MG/5ML suspension 15 mL (not administered)   Lidocaine Viscous HCl (XYLOCAINE) 2 % mouth solution 15 mL (not administered)   sodium chloride 0.9 % bolus 1,000 mL (1,000 mL Intravenous New Bag 9/19/19 2112)   ondansetron (ZOFRAN) injection 4 mg (4 mg Intravenous Given 9/19/19 2114)   dicyclomine (BENTYL) injection 20 mg (20 mg Intramuscular Given 9/19/19 2114)   iopamidol (ISOVUE-300) 61 % injection 100 mL (95 mL Intravenous Given 9/19/19 2151)     ECG/EMG Results (last 24 hours)     ** No results found for the last 24 hours. **        No orders to display                     MDM    Final diagnoses:   Pain of upper abdomen       Documentation assistance provided by nicola Davenport.  Information recorded by the scribe " was done at my direction and has been verified and validated by me.     Naeem Davenport  09/19/19 213       Chante Vega, ASIA  09/19/19 2681

## 2019-09-20 NOTE — TELEPHONE ENCOUNTER
LVM explaining patient can try Immodium OTC, and continue bland diet and staying hydrated. Left office # incase patient has further questions.

## 2019-09-23 ENCOUNTER — OFFICE VISIT (OUTPATIENT)
Dept: GASTROENTEROLOGY | Facility: CLINIC | Age: 49
End: 2019-09-23

## 2019-09-23 ENCOUNTER — TELEPHONE (OUTPATIENT)
Dept: FAMILY MEDICINE CLINIC | Facility: CLINIC | Age: 49
End: 2019-09-23

## 2019-09-23 VITALS
TEMPERATURE: 97.2 F | WEIGHT: 256 LBS | DIASTOLIC BLOOD PRESSURE: 76 MMHG | SYSTOLIC BLOOD PRESSURE: 116 MMHG | BODY MASS INDEX: 41.14 KG/M2 | OXYGEN SATURATION: 96 % | RESPIRATION RATE: 16 BRPM | HEART RATE: 71 BPM | HEIGHT: 66 IN

## 2019-09-23 DIAGNOSIS — G89.29 CHRONIC ABDOMINAL PAIN: Primary | ICD-10-CM

## 2019-09-23 DIAGNOSIS — R19.7 DIARRHEA, UNSPECIFIED TYPE: ICD-10-CM

## 2019-09-23 DIAGNOSIS — R10.9 CHRONIC ABDOMINAL PAIN: Primary | ICD-10-CM

## 2019-09-23 PROCEDURE — 99204 OFFICE O/P NEW MOD 45 MIN: CPT | Performed by: INTERNAL MEDICINE

## 2019-09-23 NOTE — TELEPHONE ENCOUNTER
Okay to give note from 13th until today.  Gastro can give future notes if she needs to be off work.

## 2019-09-23 NOTE — TELEPHONE ENCOUNTER
Called pt to see what the note is for?  Last note stated pt could return on the 19th.   I advised her to get the release from Gastro who she is seeing today but its not a release back to work but an excuse for her being off.     Pt is wanted a work excuse from the 13th until today.

## 2019-09-23 NOTE — PROGRESS NOTES
PCP: Jennyfer Mauricio PA-C    Chief Complaint   Patient presents with   • NEW PATIENT     FOR HEP C        History of Present Illness:   Haylee Hayes is a 49 y.o. female who presents to the GI clinic for assessment of diarrhea, incontinence, abdominal pain.  She reports a history of diarrhea starting 4-6 weeks ago. She is s/p ricky en y gastric bypass. She has 3-10 urgent liquid stools a day. She presented to ED a few weeks ago where she had a negative c. Dif and fecal leukocyte. She has epigastric sharp and achy abdominal pain with diarrhea. Stool does not wake her up at night. No wt loss. A Ct a/p showed mild pancreatic head edema. Repeat ct scan 2 weeks later shows no pancreatic abnormality.  She does have a 3 cm adrenal lesion that is being followed.  She has taken immodium which slows down diarrhea and stops fecal leakage.  Social alcohol use.    Past Medical History:   Diagnosis Date   • Acute respiratory failure with hypoxia (CMS/HCC) 2018   • Chest pain 2018   • Chronic pain 2018   • Depression    • DVT (deep venous thrombosis) (CMS/HCC)     left arm,  андрей   • Kidney stone     around early , has had some prior to that as well, passed all of them without surgery   • Obesity 2018   • Peptic ulceration    • Pneumonia     never had to be hospitalized for it       Past Surgical History:   Procedure Laterality Date   • APPENDECTOMY      age 15, they removed appendix while doing oopherectomy   •  SECTION     • CHOLECYSTECTOMY N/A 2018    Procedure: CHOLECYSTECTOMY LAPAROSCOPIC;  Surgeon: Edmund BEE MD;  Location: [facility] OR;  Service: General   • ENDOSCOPY N/A 6/3/2018    Procedure: ESOPHAGOGASTRODUODENOSCOPY;  Surgeon: Mark I Brunner, MD;  Location:  [facility];  Service: Gastroenterology   • GASTRECTOMY      [facility]   • RIGHT OOPHORECTOMY      torque   • TONSILLECTOMY      age 17         Current Outpatient Medications:   •  albuterol  sulfate HFA (PROAIR HFA) 108 (90 Base) MCG/ACT inhaler, Inhale 2 puffs Every 4 (Four) Hours As Needed for Wheezing., Disp: 18 g, Rfl: 0  •  buPROPion XL (WELLBUTRIN XL) 150 MG 24 hr tablet, Take 2 tablets by mouth Daily., Disp: 14 tablet, Rfl: 0  •  citalopram (CeleXA) 40 MG tablet, Take 1 tablet by mouth Daily., Disp: 90 tablet, Rfl: 1  •  diclofenac (VOLTAREN) 1 % gel gel, Apply 4 g topically to the appropriate area as directed 4 (Four) Times a Day As Needed (prn for pain)., Disp: 60 tube, Rfl: 0  •  gabapentin (NEURONTIN) 300 MG capsule, TAKE 1 CAPSULE BY MOUTH TWICE A DAY, Disp: 60 capsule, Rfl: 0  •  HYDROcodone-acetaminophen (NORCO) 5-325 MG per tablet, Take 1-2 tablets by mouth Every 6 (Six) Hours As Needed for Severe Pain ., Disp: 12 tablet, Rfl: 0  •  ondansetron ODT (ZOFRAN-ODT) 4 MG disintegrating tablet, Take 1 tablet by mouth Every 8 (Eight) Hours As Needed for Vomiting., Disp: 12 tablet, Rfl: 0  •  pantoprazole (PROTONIX) 40 MG EC tablet, Take 1 tablet by mouth 2 (Two) Times a Day Before Meals., Disp: 180 tablet, Rfl: 1  •  promethazine (PHENERGAN) 25 MG tablet, Take 1 tablet by mouth Every 6 (Six) Hours As Needed for Nausea or Vomiting., Disp: 12 tablet, Rfl: 0    Allergies   Allergen Reactions   • Coumadin [Warfarin Sodium] Anaphylaxis     Swelling of throat. LUE DVT in 2005 when inpatient for unknown cause, did fine on heparin gtt, home on coumadin and anaphylaxis, completed and tolerated 6 months lovenox.   • Cymbalta [Duloxetine Hcl] Other (See Comments)     Worsening depression, CNS issues   • Paxil [Paroxetine Hcl] Other (See Comments)     Didn't tolerate   • Prozac [Fluoxetine Hcl] Other (See Comments)     Didn't tolerate       Family History   Problem Relation Age of Onset   • Atrial fibrillation Mother    • Heart failure Mother    • Diabetes Father    • Stroke Father    • No Known Problems Son    • Coronary artery disease Maternal Grandmother    • Valvular heart disease Maternal Grandmother     • Tuberculosis Maternal Grandfather    • Tuberculosis Paternal Grandfather    • Breast cancer Neg Hx    • Ovarian cancer Neg Hx    • Cancer Neg Hx        Social History     Socioeconomic History   • Marital status:      Spouse name: Not on file   • Number of children: Not on file   • Years of education: Not on file   • Highest education level: Not on file   Tobacco Use   • Smoking status: Former Smoker     Packs/day: 0.25     Types: Cigarettes   • Smokeless tobacco: Never Used   • Tobacco comment: About a third a pack a day, quit 2/2018   Substance and Sexual Activity   • Alcohol use: Yes     Comment: about once a month 1-2 drinks at a dinner, no hx heavy use or abuse other than more social in college years   • Drug use: No   • Sexual activity: Defer   Social History Narrative    Mrs. Hayes is a 48 year old white  female. She lives in [facility] [place] [facility]. They have two sons. 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 She does not have an advanced directive.       Review of Systems   Constitutional: Negative.    HENT: Negative.    Eyes: Negative.    Respiratory: Negative.    Cardiovascular: Negative.    Gastrointestinal: Positive for abdominal pain, diarrhea and nausea.   Endocrine: Negative.    Genitourinary: Negative.    Musculoskeletal: Negative.    Skin: Negative.    Allergic/Immunologic: Negative.    Neurological: Negative.    Hematological: Negative.    Psychiatric/Behavioral: Negative.      All other systems reviewed and are negative.    There were no vitals filed for this visit.    Physical Exam  General Appearance:  Vitals as above. no acute distress  obese  Head/face:  Normocephalic, atraumatic  Eyes:   EOMI, no conjunctivitis or icterus   Nose/Sinuses:  Nares patent bilaterally without discharge or lesions  Mouth/Throat:  Posterior pharynx is pink without drainage or exudates;  dentition is in good condition and repair  Neck:  trachea is  midline, no thyromegaly  Lungs:  Clear to auscultation bilaterally  Heart:  Regular rate and rhythm without murmur, gallop or rub  Abdomen:  Soft, non-tender to palpation, no obvious masses, bowel sounds positive in four quadrants; no abdominal bruits; no guarding or rebound tenderness  Neurologic:  Alert; no focal deficits; age appropriate behavior and speech  Psychiatric: mood and affect are congruent  Vascular: extremities without edema  Skin: no rash or cyanosis.      Assessment/Plan  1.) Chronic diarrhea with leakage  Workup: stool studies  Therapies that work: immodium  Recommend colonoscopy    2.) Abdominal pain, chronic  Unclear etiology  Workup: ct a/p showed no pancreatic lesion 9/19/19. Mild pancreatic edema noted 2 weeks earler  Possibly related to pancreas, she is s/p ricky en y gastric bypass.    Recommend EGD  Future consideration: creon    3.) Abnormal finding on radiologic exam, pancreatic head edema  Not seen on follow up CT 2 weeks later.  Would assess CT pancreas in 6 months.    RTC in 4-6 months after egd/colonoscopy. She requested [facility] paperwork to be filled out.    Gael Purdy MD  9/23/2019

## 2019-09-24 NOTE — TELEPHONE ENCOUNTER
Letter has been completed. I attempted to contact patient to alert her that her letter is ready for pickup. I LVM to return our call if she needs it mailed

## 2019-09-26 DIAGNOSIS — Z12.11 SCREENING FOR COLON CANCER: Primary | ICD-10-CM

## 2019-10-03 ENCOUNTER — LAB REQUISITION (OUTPATIENT)
Dept: LAB | Facility: HOSPITAL | Age: 49
End: 2019-10-03

## 2019-10-03 ENCOUNTER — OUTSIDE FACILITY SERVICE (OUTPATIENT)
Dept: GASTROENTEROLOGY | Facility: CLINIC | Age: 49
End: 2019-10-03

## 2019-10-03 DIAGNOSIS — R19.7 DIARRHEA, UNSPECIFIED: ICD-10-CM

## 2019-10-03 DIAGNOSIS — K59.1 FUNCTIONAL DIARRHEA: ICD-10-CM

## 2019-10-03 DIAGNOSIS — R10.84 GENERALIZED ABDOMINAL PAIN: ICD-10-CM

## 2019-10-03 DIAGNOSIS — R11.0 NAUSEA: ICD-10-CM

## 2019-10-03 DIAGNOSIS — Z12.11 ENCOUNTER FOR SCREENING FOR MALIGNANT NEOPLASM OF COLON: ICD-10-CM

## 2019-10-03 PROCEDURE — 43239 EGD BIOPSY SINGLE/MULTIPLE: CPT | Performed by: INTERNAL MEDICINE

## 2019-10-03 PROCEDURE — 88305 TISSUE EXAM BY PATHOLOGIST: CPT | Performed by: INTERNAL MEDICINE

## 2019-10-03 PROCEDURE — 45385 COLONOSCOPY W/LESION REMOVAL: CPT | Performed by: INTERNAL MEDICINE

## 2019-10-03 PROCEDURE — 45380 COLONOSCOPY AND BIOPSY: CPT | Performed by: INTERNAL MEDICINE

## 2019-10-04 LAB
CYTO UR: NORMAL
LAB AP CASE REPORT: NORMAL
LAB AP CLINICAL INFORMATION: NORMAL
LAB AP DIAGNOSIS COMMENT: NORMAL
PATH REPORT.FINAL DX SPEC: NORMAL
PATH REPORT.GROSS SPEC: NORMAL

## 2019-10-07 DIAGNOSIS — R19.7 DIARRHEA, UNSPECIFIED TYPE: ICD-10-CM

## 2019-10-07 RX ORDER — CHOLESTYRAMINE 4 G/5.5G
POWDER, FOR SUSPENSION ORAL
Qty: 90 PACKET | Refills: 0 | OUTPATIENT
Start: 2019-10-07

## 2019-10-27 DIAGNOSIS — G89.29 OTHER CHRONIC PAIN: Chronic | ICD-10-CM

## 2019-10-27 RX ORDER — GABAPENTIN 300 MG/1
CAPSULE ORAL
Qty: 60 CAPSULE | Refills: 0 | Status: CANCELLED | OUTPATIENT
Start: 2019-10-27

## 2019-10-28 ENCOUNTER — RESULTS ENCOUNTER (OUTPATIENT)
Dept: FAMILY MEDICINE CLINIC | Facility: CLINIC | Age: 49
End: 2019-10-28

## 2019-10-28 ENCOUNTER — OFFICE VISIT (OUTPATIENT)
Dept: FAMILY MEDICINE CLINIC | Facility: CLINIC | Age: 49
End: 2019-10-28

## 2019-10-28 VITALS
OXYGEN SATURATION: 98 % | HEIGHT: 66 IN | HEART RATE: 89 BPM | SYSTOLIC BLOOD PRESSURE: 134 MMHG | DIASTOLIC BLOOD PRESSURE: 78 MMHG | BODY MASS INDEX: 41.32 KG/M2

## 2019-10-28 DIAGNOSIS — G89.29 OTHER CHRONIC PAIN: Chronic | ICD-10-CM

## 2019-10-28 DIAGNOSIS — M25.552 CHRONIC LEFT HIP PAIN: ICD-10-CM

## 2019-10-28 DIAGNOSIS — G89.29 CHRONIC LEFT HIP PAIN: ICD-10-CM

## 2019-10-28 DIAGNOSIS — M25.561 ACUTE PAIN OF RIGHT KNEE: Primary | ICD-10-CM

## 2019-10-28 DIAGNOSIS — F33.41 RECURRENT MAJOR DEPRESSIVE DISORDER, IN PARTIAL REMISSION (HCC): Chronic | ICD-10-CM

## 2019-10-28 PROCEDURE — 99214 OFFICE O/P EST MOD 30 MIN: CPT | Performed by: PHYSICIAN ASSISTANT

## 2019-10-28 RX ORDER — GABAPENTIN 300 MG/1
300 CAPSULE ORAL 3 TIMES DAILY
Qty: 90 CAPSULE | Refills: 0 | Status: SHIPPED | OUTPATIENT
Start: 2019-10-28 | End: 2019-11-26 | Stop reason: SDUPTHER

## 2019-10-28 NOTE — PROGRESS NOTES
Chief Complaint   Patient presents with   • Knee Pain     R -    • Fall     Oct 16, 2019 . Ordered MRI - not covered on workmans comp. Happened at work.        HPI     Haylee Hayes is a pleasant 49 y.o. female who is here for routine follow-up of abdominal pain with diarrhea, depression with anxiety, chronic right hip pain and new onset right knee injury.  Patient's diarrhea and abdominal pain has improved.  She had explosive diarrhea for approximately 3 weeks and it is now back to baseline.  Underwent colonoscopy which was unremarkable.  Followed by GI.  Stool studies and labs in ER were negative.  Given hydrocodone in ER, hasn't taken narcotics since.  History of gastric bypass.    She returned to work at  and felt lightheaded in the OR, ended up passing out and twisting her right knee.  This occurred on 10/16.  She was originally seen by worker's comp physician but deemed to be due to her viral GI bug and not a work injury.  She is wearing a brace which helps with stability and pain.  Had an xray of right knee at  ER when injury occurred, no concerns.  Has ongoing pain with instability that is not improving with time and rest.  Has not had an MRI right knee, physical therapy or any other interventions.      Has chronic back and right hip pain.  Takes gabapentin 300 mg BID which helps manage her pain.  She inquires about increasing dose as she is having more discomfort throughout the day especially with her recent right knee injury.    Mood is stable.  Continues to have mild depression.  Patient is concerned for upcoming Winter season and mood.  Currently taking celexa 40 mg QD and wellbutrin 150 mg QD.    Past Medical History:   Diagnosis Date   • Acute respiratory failure with hypoxia (CMS/[facility]) 6/2/2018   • Chest pain 6/1/2018   • Chronic pain 6/1/2018   • Depression    • DVT (deep venous thrombosis) (CMS/HCC)     left arm, 2005 андрей   • Kidney stone     around early 2000s, has had some prior to that as  well, passed all of them without surgery   • Obesity 2018   • Peptic ulceration    • Pneumonia     never had to be hospitalized for it       Past Surgical History:   Procedure Laterality Date   • APPENDECTOMY      age 15, they removed appendix while doing oopherectomy   •  SECTION     • CHOLECYSTECTOMY N/A 2018    Procedure: CHOLECYSTECTOMY LAPAROSCOPIC;  Surgeon: Edmund BEE MD;  Location:  [facility];  Service: General   • ENDOSCOPY N/A 6/3/2018    Procedure: ESOPHAGOGASTRODUODENOSCOPY;  Surgeon: Mark I Brunner, MD;  Location:  [facility];  Service: Gastroenterology   • [facility]RECT[facility]      [facility]   • RIGHT OOPHORECTOMY      torque   • TONSILLECTOMY      age 17       Family History   Problem Relation Age of Onset   • Atrial fibrillation Mother    • Heart failure Mother    • Diabetes Father    • Stroke Father    • No Known Problems Son    • Coronary artery disease Maternal Grandmother    • Valvular heart disease Maternal Grandmother    • Tuberculosis Maternal Grandfather    • Tuberculosis Paternal Grandfather    • Breast cancer Neg Hx    • Ovarian cancer Neg Hx    • Cancer Neg Hx        Social History     Socioeconomic History   • Marital status:      Spouse name: Not on file   • Number of children: Not on file   • Years of education: Not on file   • Highest education level: Not on file   Tobacco Use   • Smoking status: Former Smoker     Packs/day: 0.25     Types: Cigarettes   • Smokeless tobacco: Never Used   • Tobacco comment: About a third a pack a day, quit 2018   Substance and Sexual Activity   • Alcohol use: Yes     Comment: about once a month 1-2 drinks at a dinner, no hx heavy use or abuse other than more social in college years   • Drug use: No   • Sexual activity: Defer   Social History Narrative    Mrs. Hayes is a 48 year old white  female. She lives in [facility] [place] [facility]. They have two sons. She works as a RN on a progressive floor at [employer], and is in school  "for her master's in management. She does not have an advanced directive.       Allergies   Allergen Reactions   • Coumadin [Warfarin Sodium] Anaphylaxis     Swelling of throat. TAWNY DVT in 2005 when inpatient for unknown cause, did fine on heparin gtt, home on coumadin and anaphylaxis, completed and tolerated 6 months lovenox.   • Cymbalta [Duloxetine Hcl] Other (See Comments)     Worsening depression, CNS issues   • Paxil [Paroxetine Hcl] Other (See Comments)     Didn't tolerate   • Prozac [Fluoxetine Hcl] Other (See Comments)     Didn't tolerate       ROS    Review of Systems   Constitutional: Positive for fatigue. Negative for chills, diaphoresis and fever.   HENT: Negative for congestion, postnasal drip and rhinorrhea.    Respiratory: Negative for cough, shortness of breath and wheezing.    Cardiovascular: Negative for chest pain and leg swelling.   Gastrointestinal: Negative for abdominal pain, constipation, diarrhea, nausea, vomiting and GERD.   Musculoskeletal: Positive for arthralgias and gait problem. Negative for myalgias.   Neurological: Negative for dizziness and headache.   Psychiatric/Behavioral: Positive for depressed mood and stress. Negative for self-injury, sleep disturbance and suicidal ideas. The patient is not nervous/anxious.        Vitals:    10/28/19 0912   BP: 134/78   Pulse: 89   SpO2: 98%   Height: 167.6 cm (66\")   PainSc:   2   PainLoc: Knee     Body mass index is 41.32 kg/m².    Current Outpatient Medications on File Prior to Visit   Medication Sig Dispense Refill   • albuterol sulfate HFA (PROAIR HFA) 108 (90 Base) MCG/ACT inhaler Inhale 2 puffs Every 4 (Four) Hours As Needed for Wheezing. 18 g 0   • buPROPion XL (WELLBUTRIN XL) 150 MG 24 hr tablet Take 2 tablets by mouth Daily. 14 tablet 0   • citalopram (CeleXA) 40 MG tablet Take 1 tablet by mouth Daily. 90 tablet 1   • diclofenac (VOLTAREN) 1 % gel gel Apply 4 g topically to the appropriate area as directed 4 (Four) Times a Day As " Needed (prn for pain). 60 tube 0   • gabapentin (NEURONTIN) 300 MG capsule TAKE 1 CAPSULE BY MOUTH TWICE A DAY 60 capsule 0   • ondansetron ODT (ZOFRAN-ODT) 4 MG disintegrating tablet Take 1 tablet by mouth Every 8 (Eight) Hours As Needed for Vomiting. 12 tablet 0   • pantoprazole (PROTONIX) 40 MG EC tablet Take 1 tablet by mouth 2 (Two) Times a Day Before Meals. 180 tablet 1   • [DISCONTINUED] HYDROcodone-acetaminophen (NORCO) 5-325 MG per tablet Take 1-2 tablets by mouth Every 6 (Six) Hours As Needed for Severe Pain . 12 tablet 0   • [DISCONTINUED] promethazine (PHENERGAN) 25 MG tablet Take 1 tablet by mouth Every 6 (Six) Hours As Needed for Nausea or Vomiting. 12 tablet 0   • [DISCONTINUED] Sod Picosulfate-Mag Ox-Cit Acd 10-3.5-12 MG-GM -GM/160ML solution Take 1 kit by mouth Take As Directed. Follow instructions that were mailed to your home. If you didn't receive these call [phone]. 2 bottle 0   • Multiple Vitamins-Minerals (MULTIVITAMIN ADULT PO) Take 1 capsule by mouth Daily.       No current facility-administered medications on file prior to visit.        Results for orders placed or performed in visit on 10/03/19   Tissue Pathology Exam   Result Value Ref Range    Case Report       Surgical Pathology Report                         Case: [id number]                                  Authorizing Provider:  Gael Purdy MD    Collected:                                        Pathologist:           Otoniel Carver MD      Received:            10/03/2019 11:41 AM          Specimens:   1) - Ileum, terminal                                                                                2) - Colon                                                                                          3) - Large Intestine, Sigmoid Colon                                                                 4) - Colon                                                                                          5) - Stomach                                                                                Clinical Information       The working history is colon cancer screening, diarrhea, normal ileum, sigmoid polyp, good anal tone, suspect IBS, abdominal pain and history of [place]-Y gastric bypass.        Final Diagnosis        1. TERMINAL ILEUM BIOPSY:  Overall preserved villous architecture with no significant histopathologic change.  No parasites noted.  No villous blunting or increased intraepithelial lymphocytes suggestive of celiac disease.   2. RANDOM COLON BIOPSIES  Colonic mucosa with nonspecific focal active colitis (see comment).  No histologic features of collagenous colitis, lymphocytic colitis or chronic colitis identified.   3. SIGMOID COLON POLYP:  Tubular adenoma.  No high-grade dysplasia identified.   4. SMALL BOWEL BIOPSY:  Overall preserved villous architecture with no significant histopathologic change.  No parasites noted.  No villous blunting or increased intraepithelial lymphocytes suggestive of celiac disease.   5. GASTRIC BIOPSY:  Minimal chronic gastritis without activity.  Negative for intestinal metaplasia and dysplasia.  No Helicobacter pylori-like organisms identified on routine stains.    PCC/dlb        Comment       In the random colon biopsies, there is focal acute inflammation noted in the surface epithelium without associated changes of chronicity.  Changes are focal and limited.  Bowel preparation effect would be among the differential diagnoses.      Gross Description       Part 1 received in formalin labeled as terminal ileum biopsy are two tan soft tissue fragments aggregating 0.4 x 0.4 x 0.2 cm, submitted in toto in cassette 1.    Part 2 received in formalin labeled as random colon biopsies consists of multiple tan soft tissue fragments aggregating 1.0 x 1.0 x 0.2 cm, submitted in toto in cassette 2.     Part 3 received in formalin labeled as sigmoid colon polyp consists of a 0.3 x 0.3 x 0.3 cm tan polyp,  submitted in toto in cassette 3.      Part 4 received in formalin labeled as small bowel biopsy consists of multiple tan soft tissue fragments aggregating 1.0 x 1.0 x 0.2 cm, submitted in toto in cassette 4.      Part 5 received in formalin labeled as stomach biopsy consists of a few tan soft tissue fragments aggregating 0.6 x 0.3 x 0.3 cm, submitted in toto in cassette 5.  HBM/mm              Microscopic Description       The slides are reviewed and demonstrate histopathologic features supporting the above rendered diagnosis.             PE    Physical Exam   Constitutional: She appears well-developed and well-nourished. She is active and cooperative. No distress.   HENT:   Head: Normocephalic and atraumatic.   Eyes: EOM are normal.   Neck: Normal range of motion.   Cardiovascular: Normal rate, regular rhythm and normal heart sounds.   Pulmonary/Chest: Effort normal and breath sounds normal.   Musculoskeletal: She exhibits no edema.        Right knee: She exhibits normal range of motion, no effusion and no deformity. Tenderness found. Lateral joint line tenderness noted.   Neurological: She is alert.   Skin: Skin is warm. She is not diaphoretic. No erythema.   Psychiatric: She has a normal mood and affect. Her speech is normal and behavior is normal. Judgment and thought content normal. She is not actively hallucinating. She is attentive.   Vitals reviewed.      A/P    Haylee was seen today for knee pain and fall.    Diagnoses and all orders for this visit:    Acute pain of right knee  -     MRI Knee Right Without Contrast; Future  Patient fell at work and twisted right knee  Xray at  [facility] was unremarkable  Wearing brace, no relief in pain and is still having instability  Will order MRI right knee    Other chronic pain  -     Urine Drug Screen - Urine, Clean Catch; Future  Patient is currently taking gabapentin 300 mg BID, she requests increase to TID as she is having break through pain during the day  Gabapentin  currently works well to help with her right hip and leg pain  Contract and shan up-to-date  Will order UDS    Recurrent major depressive disorder, in partial remission (CMS/HCC)  Taking celexa 40 mg QD and wellbutrin 150 mg QD  Supposed to be taking 300 mg of Wellbutrin and patient admits she didn't realize this and has only been taking 1 tablet, will increase  Discussed that I would not recommend higher dosing with either medication at this time  If symptoms of depression continue, recommend psychiatry referral  Denies suicidal ideation     This patient is on a controlled substance which improves symptoms/quality of life and is aware of the risks, benefits and possible side-effects current treatment. The patient denies any medication side-effects at this time. A controlled substance agreement will be obtained or is currently on file. We reviewed required monitoring for controlled substances including but not limited to quarterly follow-up visits, annual depression screening, and urine drug screens to which the patient is agreeable. A SHAN report has been or shortly will be reviewed. There are no signs of deviation or misuse.    Plan of care reviewed with patient at the conclusion of today's visit. Education was provided regarding diagnosis, management and any prescribed or recommended OTC medications.  Patient verbalizes understanding of and agreement with management plan.    Return in about 3 months (around 1/28/2020) for Recheck.     Jennyfer Mauricio PA-C

## 2019-10-29 ENCOUNTER — TELEPHONE (OUTPATIENT)
Dept: FAMILY MEDICINE CLINIC | Facility: CLINIC | Age: 49
End: 2019-10-29

## 2019-10-29 DIAGNOSIS — Z78.9 HEPATITIS B IMMUNE: Primary | ICD-10-CM

## 2019-10-29 NOTE — TELEPHONE ENCOUNTER
Order placed.  Please notify patient.  She may return to lab on first floor when it is convenient for her.

## 2019-10-29 NOTE — TELEPHONE ENCOUNTER
LVM explaining lab orders have been placed. Office # given incase patient has any further questions.

## 2019-10-29 NOTE — TELEPHONE ENCOUNTER
Pt stated that her work required Hep B titer and she had forgotten to mention it on her 10/28 visit. Haylee would like to know if a lab order can be put in so that she can have it done.    Haylee can be reached at [phone]

## 2019-11-01 ENCOUNTER — LAB (OUTPATIENT)
Dept: LAB | Facility: HOSPITAL | Age: 49
End: 2019-11-01

## 2019-11-01 ENCOUNTER — HOSPITAL ENCOUNTER (OUTPATIENT)
Dept: MRI IMAGING | Facility: HOSPITAL | Age: 49
Discharge: HOME | End: 2019-11-01
Admitting: PHYSICIAN ASSISTANT

## 2019-11-01 ENCOUNTER — TELEPHONE (OUTPATIENT)
Dept: FAMILY MEDICINE CLINIC | Facility: CLINIC | Age: 49
End: 2019-11-01

## 2019-11-01 DIAGNOSIS — M25.561 ACUTE PAIN OF RIGHT KNEE: ICD-10-CM

## 2019-11-01 DIAGNOSIS — Z78.9 HEPATITIS B IMMUNE: ICD-10-CM

## 2019-11-01 PROCEDURE — 86706 HEP B SURFACE ANTIBODY: CPT

## 2019-11-01 PROCEDURE — 73721 MRI JNT OF LWR EXTRE W/O DYE: CPT

## 2019-11-01 NOTE — TELEPHONE ENCOUNTER
PATIENT BROUGHT IN [employer]ML[employer] FORMS TO BE COMPLETED. PLACED IN [employer]RI[employer]S FOLDER UP FRONT. THIS PATIENT HAS PAID FOR THE FORMS TODAY.

## 2019-11-02 LAB — HBV SURFACE AB SER RIA-ACNC: REACTIVE

## 2019-11-04 DIAGNOSIS — M25.561 ACUTE PAIN OF RIGHT KNEE: Primary | ICD-10-CM

## 2019-11-08 ENCOUNTER — HOSPITAL ENCOUNTER (OUTPATIENT)
Dept: PHYSICAL THERAPY | Facility: HOSPITAL | Age: 49
Setting detail: THERAPIES SERIES
Discharge: HOME | End: 2019-11-08

## 2019-11-08 DIAGNOSIS — M25.561 ACUTE PAIN OF RIGHT KNEE: Primary | ICD-10-CM

## 2019-11-08 PROCEDURE — 97161 PT EVAL LOW COMPLEX 20 MIN: CPT

## 2019-11-08 NOTE — THERAPY EVALUATION
Outpatient Physical Therapy Ortho Initial Evaluation  [facility]     Patient Name: Haylee Hayes  : 1970  MRN: [id number]  Today's Date: 2019      Visit Date: 2019    Patient Active Problem List   Diagnosis   • Atypical chest pain   • Depression   • Obesity   • Chronic pain   • Acute respiratory failure with hypoxia (CMS/HCC)   • Nausea   • Epigastric abdominal tenderness with rebound tenderness   • Snoring   • RUQ abdominal pain   • LLQ abdominal pain   • H/O gastric bypass   • History of herpes zoster   • Insomnia        Past Medical History:   Diagnosis Date   • Acute respiratory failure with hypoxia (CMS/HCC) 2018   • Chest pain 2018   • Chronic pain 2018   • Depression    • DVT (deep venous thrombosis) (CMS/HCC)     left arm, 2005 андрей   • Kidney stone     around early , has had some prior to that as well, passed all of them without surgery   • Obesity 2018   • Peptic ulceration    • Pneumonia     never had to be hospitalized for it        Past Surgical History:   Procedure Laterality Date   • APPENDECTOMY      age 15, they removed appendix while doing oopherectomy   •  SECTION     • CHOLECYSTECTOMY N/A 2018    Procedure: CHOLECYSTECTOMY LAPAROSCOPIC;  Surgeon: Edmund BEE MD;  Location: [facility] OR;  Service: General   • ENDOSCOPY N/A 6/3/2018    Procedure: ESOPHAGOGASTRODUODENOSCOPY;  Surgeon: Mark I Brunner, MD;  Location: [facility] ENDOSCOPY;  Service: Gastroenterology   • GASTRECTOMY      [facility]   • RIGHT OOPHORECTOMY      torque   • TONSILLECTOMY      age 17     Visit Dx:     ICD-10-CM ICD-9-CM   1. Acute pain of right knee M25.561 719.46     Patient History     Row Name 19 1115             History    Chief Complaint  Pain  -AC      Type of Pain  Knee pain  -AC      Date Current Problem(s) Began  10/16/19  -      Brief Description of Current Complaint  Pt had had some knee pain ~1 month prior to passing out at work and  "twisting knee. Went to ED with negative XR; knee was just sore. Worked 2 days later with \"really bad pain,\" has gotten worse. Has not been to work since the 18th. Hurts to put pressure on knee to go up stairs. Thought she tore her meniscus but MRI only found degenerative arthritis. Doctor instructed to rest/ice/elevate, but feels her muscles are getting weak. Can bend without pain but can't weightbear. Has been wearing a brace to help stabilize, feels her knee will \"bend backwards\" without it. Denies clicking/popping in the knee. Notes lateral swelling. 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  -      Patient seeing anyone else for problem(s)?  Mauricio  -      How has patient tried to help current problem?  Brace, Neurontin/Tylenol, ice, elevated, TENS  -      What clinical tests have you had for this problem?  MRI;X-ray  -      Results of Clinical Tests  XR neg; MRI: degeneration, insubstantial signal in medial meniscus without acute surface tear  -      Surgery/Hospitalization  Pancreatitis 2 months ago, out of work 2-3 weeks   -      History of Previous Related Injuries  Chronic back/R hip pain  -         Pain     Pain Location  Knee  -AC      Pain at Present  1  -AC      Pain at Best  1  -AC      Pain at Worst  7  -AC      Pain Frequency  Intermittent  -      Pain Description  Other (Comment);Sharp \"Bone on bone on stairs\"  -      What Performance Factors Make the Current Problem(s) WORSE?  Weightbearing, going up stairs, in/out of bed, bending over to pick something up  -      What " Performance Factors Make the Current Problem(s) BETTER?  Rest  -AC      Is your sleep disturbed?  Yes  -AC      Is medication used to assist with sleep?  Yes  -AC      Total hours of sleep per night  6 hours, naps t/o day  -AC      What position do you sleep in?  Right sidelying;Supine 2 pillow between legs, supine w/ R leg elevated  -AC      Difficulties at work?  Unable to work  -AC      Difficulties with ADL's?  Stairs, in/out of bathtub, cleaning, grocery  -AC         Fall Risk Assessment    Any falls in the past year:  Yes  -AC      Number of falls reported in the last 12 months  2  -AC      Factors that contributed to the fall:  Fatigue;Other (comment);Tripped Passed out; tripped down the stairs  -AC      Does patient have a fear of falling  No  -AC         Daily Activities    Primary Language  English  -AC      Are you able to read  Yes  -AC      Are you able to write  Yes  -AC      How does patient learn best?  Listening;Reading;Demonstration  -AC      Teaching needs identified  Home Exercise Program  -AC      Patient is concerned about/has problems with  Climbing Stairs;Difficulty with self care (i.e. bathing, dressing, toileting:;Performing home management (household chores, shopping, care of dependents);Walking;Transfers (getting out of a chair, bed);Performing job responsibilities/community activities (work, school,  -AC      Does patient have problems with the following?  Depression;Anxiety  -AC      Barriers to learning  None  -AC      Pt Participated in POC and Goals  Yes  -AC         Safety    Are you being hurt, hit, or frightened by anyone at home or in your life?  No  -AC      Are you being neglected by a caregiver  No  -AC        User Key  (r) = Recorded By, (t) = Taken By, (c) = Cosigned By    Initials Name Provider Type    AC Mercedes Anguiano PT Physical Therapist        PT Ortho     Row Name 11/08/19 6140       Posture/Observations    Alignment Options  Genu valgus;Pes Planus;Lumbar  lordosis  -AC    Lumbar lordosis  Increased  -AC    Genu valgus  Left:;Mild;Right:;Moderate  -AC    Pes Planus  Bilateral:;Increased  -AC    Posture/Observations Comments  Mild genu recurvatum bilaterally  -AC       Quarter Clearing    Quarter Clearing  Lower Quarter Clearing  -AC       DTR- Lower Quarter Clearing    Patellar tendon (L2-4)  Bilateral:;1- Minimal response  -AC    Achilles tendon (S1-2)  Bilateral:;1- Minimal response  -AC       Myotomal Screen- Lower Quarter Clearing    Hip flexion (L2)  Right:;3 (Fair);Left:;4+ (Good +) R medial/lateral knee pain  -AC    Knee extension (L3)  Right:;Unable to assess;Left:;5 (Normal) Patellar tendon/superior patellar pain  -AC    Ankle DF (L4)  Bilateral:;5 (Normal)  -AC    Great toe extension (L5)  Right:;5 (Normal);Left:;4+ (Good +)  -AC    Ankle PF (S1)  Right:;2+ (Poor +);Left:;4- (Good -)  -AC    Knee flexion (S2)  Bilateral:;4+ (Good +)  -AC       Special Tests/Palpation    Special Tests/Palpation  Knee  -AC       Knee Palpation    Knee Palpation?  Yes  -AC    Medial Joint Line  Right:;Tender Exquisit TTP  -AC    Lateral Joint Line  Right:;Tender TTP along LCL focally at distal insertion  -AC       Patellar Accessory Motions    Patellar Accessory Motions Tested?  Yes  -AC    Superior glide  Right pain  -AC    Inferior glide  Right pain  -AC    Medial glide  Right:;Hypomobile  -AC    Lateral glide  Right:;WNL  -AC       Knee Special Tests    Valgus stress (MCL lesion)  Right:;Positive  -AC    Varus stress (LCL lesion)  Right:;Positive  -AC    Apley’s distraction test (meniscal lesion vs OA)  Unable to Test  -AC    Apley’s compression test (meniscal lesion vs OA)  Unable to Test  -AC    Thessaly test (meniscal lesion)  Right:;Positive In extension, with greater pain with RLE IR  -AC    Reverse pivot shift test (posterolateral instability)  Right:;Positive  -AC       General ROM    RT Lower Ext  Rt Knee Extension/Flexion  -AC    LT Lower Ext  Lt Knee  Extension/Flexion  -AC       Right Lower Ext    Rt Knee Extension/Flexion AROM  3-104  -AC    Rt Knee Extension/Flexion PROM  0-110 with pain  -AC       Left Lower Ext    Lt Knee Extension/Flexion AROM  2-120  -AC       MMT (Manual Muscle Testing)    Rt Lower Ext  Rt Hip Internal (Medial) Rotation;Rt Hip External (Lateral) Rotation;Rt Hip ABduction;Rt Hip Extension  -AC    Lt Lower Ext  Lt Hip Extension;Lt Hip ABduction;Lt Hip Internal (Medial) Rotation;Lt Hip External (Lateral) Rotation  -AC       MMT Right Lower Ext    Rt Hip Extension MMT, Gross Movement  (3-/5) fair minus  -AC    Rt Hip ABduction MMT, Gross Movement  other (see comments) Unable to test  -AC    Rt Hip Internal (Medial) Rotation MMT, Gross Movement  (3-/5) fair minus Sharp lateral pain and patellar pain  -AC    Rt Hip External (Lateral) Rotation MMT, Gross Movement  (4/5) good  -AC       MMT Left Lower Ext    Lt Hip Extension MMT, Gross Movement  (3-/5) fair minus  -AC    Lt Hip ABduction MMT, Gross Movement  (4/5) good  -AC    Lt Hip Internal (Medial) Rotation MMT, Gross Movement  (5/5) normal  -AC    Lt Hip External (Lateral) Rotation MMT, Gross Movement  (4+/5) good plus  -AC       Sensation    Sensation WNL?  WNL  -AC       Flexibility    Flexibility Tested?  Lower Extremity  -AC       Lower Extremity Flexibility    Hamstrings  Bilateral:;Mildly limited  -AC      User Key  (r) = Recorded By, (t) = Taken By, (c) = Cosigned By    Initials Name Provider Type    AC Mercedes Anguiano, PT Physical Therapist      Therapy Education  Education Details: Pt educated on pathomechanics of findings and POC with HEP provided includin way SLR. Pt encouraged to wear brace initially with exercises and to perform firm quad set before each lift. Discussed modifications as well.  Given: HEP  Program: New  How Provided: Verbal, Demonstration, Written  Provided to: Patient  Level of Understanding: Verbalized     PT OP Goals     Row Name 19 5157           PT Short Term Goals    STG Date to Achieve  11/29/19  -AC     STG 1  Decrease R knee pain by > or = 50%.  -AC     STG 1 Progress  New  -AC     STG 2  Perform independent HEP to maximize stability/strength of knee and decrease pain.  -AC     STG 2 Progress  New  -AC     STG 3  Improve R knee flexion AROM to at least 120 deg.  -AC     STG 3 Progress  New  -AC     STG 4  Enable ability to perform 10 full bilateral heel raises without difficulty.  -AC     STG 4 Progress  New  -AC        Long Term Goals    LTG Date to Achieve  12/20/19  -AC     LTG 1  Decrease R knee pain by > or = 75%.  -AC     LTG 1 Progress  New  -AC     LTG 2  Improve LEFS to > or = 40/80.  -AC     LTG 2 Progress  New  -AC     LTG 3  Enable ability to go up/down stairs with normal gait pattern with < or = 3/10 pain.  -AC     LTG 3 Progress  New  -AC     LTG 4  Improve RLE strength to grossly 4+/5.  -AC     LTG 4 Progress  New  -AC     LTG 5  Enable ability to return to full work duty and perform home/community ambulation with minimal difficulty < or = 3/10 pain.  -AC     LTG 5 Progress  New  -AC        Time Calculation    PT Goal Re-Cert Due Date  02/06/20  -       User Key  (r) = Recorded By, (t) = Taken By, (c) = Cosigned By    Initials Name Provider Type    Mercedes Nayak, PT Physical Therapist        PT Assessment/Plan     Row Name 11/08/19 3050          PT Assessment    Functional Limitations  Impaired gait;Limitations in community activities;Limitation in home management;Performance in leisure activities;Performance in work activities;Performance in self-care ADL  -AC     Impairments  Range of motion;Pain;Joint mobility;Muscle strength;Joint integrity;Impaired postural alignment;Impaired muscle endurance;Impaired muscle power;Gait  -AC     Assessment Comments  Pt presents with evolving symptoms of low complexity with signs consistent with R knee strain. Assessment today was limited d/t patient apprehension to maneuvering knee and  increased pain. Pt does demonstrate increased MCL/LCL laxity upon testing and reports instability with weightbearing/walking. Pt is currently off of work until cleared by physician to return. Pain has limited pt's ability to also perform daily activities including bathing, dressing, and stair climbing. PT intervention is warranted to maximize mobility/strength to return to PLOF.   -AC     Please refer to paper survey for additional self-reported information  Yes  -AC     Rehab Potential  Good  -AC     Patient/caregiver participated in establishment of treatment plan and goals  Yes  -AC     Patient would benefit from skilled therapy intervention  Yes  -AC        PT Plan    PT Frequency  2x/week  -AC     Predicted Duration of Therapy Intervention (Therapy Eval)  8 weeks   -AC     Planned CPT's?  PT EVAL LOW COMPLEXITY: 12297;PT THER PROC EA 15 MIN: 65739;PT MANUAL THERAPY EA 15 MIN: 55828;PT GAIT TRAINING EA 15 MIN: 69680;PT HOT/COLD PACK WC NONMCARE: 79649;PT RE-EVAL: 16374;PT THER ACT EA 15 MIN: 24988;PT NEUROMUSC RE-EDUCATION EA 15 MIN: 26801;PT SELF CARE/HOME MGMT/TRAIN EA 15: 13759;PT THER SUPP EA 15 MIN;PT THER MASS EA 15 MIN: 48975;PT ELECTRICAL STIM UNATTEND:   -AC     PT Plan Comments  Progress strengthening/stability exercises for R hip/knee as tolerated, with manual therapy/modalities PRN.   -AC       User Key  (r) = Recorded By, (t) = Taken By, (c) = Cosigned By    Initials Name Provider Type    Mercedes Nayak, PT Physical Therapist      Outcome Measure Options: Lower Extremity Functional Scale (LEFS)  Lower Extremity Functional Index  Any of your usual work, housework or school activities: Extreme difficulty or unable to perform activity  Your usual hobbies, recreational or sporting activities: Extreme difficulty or unable to perform activity  Getting into or out of the bath: Moderate difficulty  Walking between rooms: A little bit of difficulty  Putting on your shoes or socks: A little bit  of difficulty  Squatting: Extreme difficulty or unable to perform activity  Lifting an object, like a bag of groceries from the floor: No difficulty  Performing light activities around your home: No difficulty  Performing heavy activities around your home: Quite a bit of difficulty  Getting into or out of a car: Quite a bit of difficulty  Walking 2 blocks: Extreme difficulty or unable to perform activity  Walking a mile: Extreme difficulty or unable to perform activity  Going up or down 10 stairs (about 1 flight of stairs): Quite a bit of difficulty  Standing for 1 hour: Quite a bit of difficulty  Sitting for 1 hour: No difficulty  Running on even ground: Extreme difficulty or unable to perform activity  Running on uneven ground: Extreme difficulty or unable to perform activity  Making sharp turns while running fast: Extreme difficulty or unable to perform activity  Hopping: Extreme difficulty or unable to perform activity  Rolling over in bed: A little bit of difficulty  Total: 27    Time Calculation:     Start Time: 1115     Therapy Charges for Today     Code Description Service Date Service Provider Modifiers Qty    77323016029 HC PT EVAL LOW COMPLEXITY 4 11/8/2019 Mercedes Anguiano, PT GP 1        PT G-Codes  Outcome Measure Options: Lower Extremity Functional Scale (LEFS)  Total: 27         Mercedes Anguiano, DREW  11/8/2019

## 2019-11-12 ENCOUNTER — OFFICE VISIT (OUTPATIENT)
Dept: FAMILY MEDICINE CLINIC | Facility: CLINIC | Age: 49
End: 2019-11-12

## 2019-11-12 ENCOUNTER — LAB (OUTPATIENT)
Dept: LAB | Facility: HOSPITAL | Age: 49
End: 2019-11-12

## 2019-11-12 VITALS
DIASTOLIC BLOOD PRESSURE: 78 MMHG | HEART RATE: 96 BPM | SYSTOLIC BLOOD PRESSURE: 116 MMHG | BODY MASS INDEX: 38.85 KG/M2 | HEIGHT: 66 IN

## 2019-11-12 DIAGNOSIS — Z78.9 NO HISTORY OF VACCINATION FOR MEASLES, MUMPS, RUBELLA (MMR): ICD-10-CM

## 2019-11-12 DIAGNOSIS — M25.551 CHRONIC RIGHT HIP PAIN: ICD-10-CM

## 2019-11-12 DIAGNOSIS — G89.29 CHRONIC RIGHT HIP PAIN: ICD-10-CM

## 2019-11-12 DIAGNOSIS — F33.41 RECURRENT MAJOR DEPRESSIVE DISORDER, IN PARTIAL REMISSION (HCC): Chronic | ICD-10-CM

## 2019-11-12 DIAGNOSIS — M54.41 CHRONIC RIGHT-SIDED LOW BACK PAIN WITH RIGHT-SIDED SCIATICA: ICD-10-CM

## 2019-11-12 DIAGNOSIS — M25.561 ACUTE PAIN OF RIGHT KNEE: Primary | ICD-10-CM

## 2019-11-12 DIAGNOSIS — G89.29 CHRONIC RIGHT-SIDED LOW BACK PAIN WITH RIGHT-SIDED SCIATICA: ICD-10-CM

## 2019-11-12 PROCEDURE — 86735 MUMPS ANTIBODY: CPT

## 2019-11-12 PROCEDURE — 86762 RUBELLA ANTIBODY: CPT

## 2019-11-12 PROCEDURE — 99214 OFFICE O/P EST MOD 30 MIN: CPT | Performed by: PHYSICIAN ASSISTANT

## 2019-11-12 PROCEDURE — 86765 RUBEOLA ANTIBODY: CPT

## 2019-11-12 NOTE — PROGRESS NOTES
Chief Complaint   Patient presents with   • Knee Pain     Here for a follow up on her right knee       HPI     Haylee Hayes is a pleasant 49 y.o. female who is here for follow-up on right knee injury.  Patient's GI issues have completely resolved.  MRI right knee did not show meniscus tear but did show change in signal.  Patient has been going to physical therapy and has improvement.  Still having pain with activity and walking.  Wearing brace.  Taking neurontin 300 mg TID and tolerating well.  This has improved her chronic back and right hip pain.  Mood has improved with increased wellbutrin.  Overall patient is doing well and feeling better.   is present at appointment.    Past Medical History:   Diagnosis Date   • Acute respiratory failure with hypoxia (CMS/HCC) 2018   • Chest pain 2018   • Chronic pain 2018   • Depression    • DVT (deep venous thrombosis) (CMS/[facility])     left arm,  андрей   • Kidney stone     around early , has had some prior to that as well, passed all of them without surgery   • Obesity 2018   • Peptic ulceration    • Pneumonia     never had to be hospitalized for it       Past Surgical History:   Procedure Laterality Date   • APPENDECTOMY      age 15, they removed appendix while doing oopherectomy   •  SECTION     • CHOLECYSTECTOMY N/A 2018    Procedure: CHOLECYSTECTOMY LAPAROSCOPIC;  Surgeon: Edmund BEE MD;  Location: [facility] OR;  Service: General   • ENDOSCOPY N/A 6/3/2018    Procedure: ESOPHAGOGASTRODUODENOSCOPY;  Surgeon: Mark I Brunner, MD;  Location: [facility] ENDOSCOPY;  Service: Gastroenterology   • GASTRECTOMY      [facility]   • RIGHT OOPHORECTOMY      torque   • TONSILLECTOMY      age 17       Family History   Problem Relation Age of Onset   • Atrial fibrillation Mother    • Heart failure Mother    • Diabetes Father    • Stroke Father    • No Known Problems Son    • Coronary artery disease Maternal Grandmother    • Valvular  heart disease Maternal Grandmother    • Tuberculosis Maternal Grandfather    • Tuberculosis Paternal Grandfather    • Breast cancer Neg Hx    • Ovarian cancer Neg Hx    • Cancer Neg Hx        Social History     Socioeconomic History   • Marital status:      Spouse name: Not on file   • Number of children: Not on file   • Years of education: Not on file   • Highest education level: Not on file   Tobacco Use   • Smoking status: Former Smoker     Packs/day: 0.25     Types: Cigarettes   • Smokeless tobacco: Never Used   • Tobacco comment: About a third a pack a day, quit 2/2018   Substance and Sexual Activity   • Alcohol use: Yes     Comment: about once a month 1-2 drinks at a dinner, no hx heavy use or abuse other than more social in college years   • Drug use: No   • Sexual activity: Defer   Social History Narrative    Mrs. Hayes is a 48 year old white  female. She lives in [place]. They have two sons. 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 She does not have an advanced directive.       Allergies   Allergen Reactions   • Coumadin [Warfarin Sodium] Anaphylaxis     Swelling of throat. LUE DVT in 2005 when inpatient for unknown cause, did fine on heparin gtt, home on coumadin and anaphylaxis, completed and tolerated 6 months lovenox.   • Cymbalta [Duloxetine Hcl] Other (See Comments)     Worsening depression, CNS issues   • Paxil [Paroxetine Hcl] Other (See Comments)     Didn't tolerate   • Prozac [Fluoxetine Hcl] Other (See Comments)     Didn't tolerate       ROS    Review of Systems   Constitutional: Negative for chills, diaphoresis, fatigue and fever.   HENT: Negative for congestion, postnasal drip and rhinorrhea.    Respiratory: Negative for cough, shortness of breath and wheezing.    Cardiovascular: Negative for chest pain and leg swelling.   Musculoskeletal: Positive for arthralgias, gait problem and myalgias.   Neurological: Positive for  "weakness. Negative for dizziness, numbness and headache.   Psychiatric/Behavioral: Negative for self-injury, sleep disturbance, suicidal ideas, depressed mood and stress. The patient is not nervous/anxious.        Vitals:    11/12/19 0913   BP: 116/78   Pulse: 96   Height: 167.4 cm (65.9\")     Body mass index is 38.85 kg/m².    Current Outpatient Medications on File Prior to Visit   Medication Sig Dispense Refill   • albuterol sulfate HFA (PROAIR HFA) 108 (90 Base) MCG/ACT inhaler Inhale 2 puffs Every 4 (Four) Hours As Needed for Wheezing. 18 g 0   • buPROPion XL (WELLBUTRIN XL) 150 MG 24 hr tablet Take 2 tablets by mouth Daily. 14 tablet 0   • citalopram (CeleXA) 40 MG tablet Take 1 tablet by mouth Daily. 90 tablet 1   • diclofenac (VOLTAREN) 1 % gel gel Apply 4 g topically to the appropriate area as directed 4 (Four) Times a Day As Needed (prn for pain). 60 tube 0   • gabapentin (NEURONTIN) 300 MG capsule Take 1 capsule by mouth 3 (Three) Times a Day. 90 capsule 0   • Multiple Vitamins-Minerals (MULTIVITAMIN ADULT PO) Take 1 capsule by mouth Daily.     • ondansetron ODT (ZOFRAN-ODT) 4 MG disintegrating tablet Take 1 tablet by mouth Every 8 (Eight) Hours As Needed for Vomiting. 12 tablet 0   • pantoprazole (PROTONIX) 40 MG EC tablet Take 1 tablet by mouth 2 (Two) Times a Day Before Meals. 180 tablet 1     No current facility-administered medications on file prior to visit.        Results for orders placed or performed in visit on 11/01/19   Hepatitis B Surface Antibody   Result Value Ref Range    Hep B S Ab Reactive (A) Non-Reactive       PE    Physical Exam   Constitutional: She appears well-developed and well-nourished. She is active and cooperative. No distress.   HENT:   Head: Normocephalic and atraumatic.   Eyes: EOM are normal.   Neck: Normal range of motion.   Cardiovascular: Normal rate, regular rhythm and normal heart sounds.   Pulmonary/Chest: Effort normal and breath sounds normal.   Musculoskeletal: " Normal range of motion. She exhibits no edema.   Wearing brace on right knee.   Neurological: She is alert.   Skin: Skin is warm. She is not diaphoretic. No erythema.   Psychiatric: She has a normal mood and affect. Her speech is normal and behavior is normal. Judgment and thought content normal. She is not actively hallucinating. She is attentive.   Vitals reviewed.      A/P    Haylee was seen today for knee pain.    Diagnoses and all orders for this visit:    Acute pain of right knee  Ongoing pain with improvement since starting physical therapy  MRI right knee did not show meniscus tear but did show abnormal signal  Recommend remaining off work for another week, note until 11/20  Call if symptoms change or worsen    Recurrent major depressive disorder, in partial remission (CMS/HCC)  Mood is improved with increased wellbutrin to 300 mg QD    No history of vaccination for measles, mumps, rubella (MMR)  -     Measles / Mumps / Rubella Immunity; Future    Chronic right-sided low back pain with right-sided sciatica  Chronic right hip pain  Has noticed improvement in pain of both back and right hip with gabapentin 300 mg TID  Tolerating medication well, no complaints  -MAURICIO [place] and contract on file       Plan of care reviewed with patient at the conclusion of today's visit. Education was provided regarding diagnosis, management and any prescribed or recommended OTC medications.  Patient verbalizes understanding of and agreement with management plan.    No Follow-up on file.     Jennyfer Mauricio PA-C

## 2019-11-13 LAB
MEV IGG SER IA-ACNC: POSITIVE
MUV IGG SER IA-ACNC: POSITIVE
RUBV IGG SERPL IA-ACNC: POSITIVE

## 2019-11-26 DIAGNOSIS — G89.29 OTHER CHRONIC PAIN: Chronic | ICD-10-CM

## 2019-11-26 RX ORDER — GABAPENTIN 300 MG/1
300 CAPSULE ORAL 3 TIMES DAILY
Qty: 90 CAPSULE | Refills: 1 | Status: SHIPPED | OUTPATIENT
Start: 2019-11-26 | End: 2020-01-27 | Stop reason: SDUPTHER

## 2019-11-26 NOTE — TELEPHONE ENCOUNTER
Requesting refill on gabapentin via fax form    Last fill: 10/28/19  Last office visit: 11/12/19  Next office visit: 1/27/20  Date of last Albino: Today  CSA up-to-date? 9/13/19  Date of last UDS: 10/28/19  UDS consistent: consistent

## 2019-12-04 ENCOUNTER — TELEPHONE (OUTPATIENT)
Dept: FAMILY MEDICINE CLINIC | Facility: CLINIC | Age: 49
End: 2019-12-04

## 2019-12-04 DIAGNOSIS — M25.561 ACUTE PAIN OF RIGHT KNEE: Primary | ICD-10-CM

## 2019-12-04 NOTE — TELEPHONE ENCOUNTER
Pt was calling inquiring about Physical Therapy referral. Pt wants to go to [facility] on [address] if possible. Pt also inquiring about injections for the knee. Pt wanted to know if Jennyfer can do those injections or if she would need another referral out for that?

## 2019-12-05 NOTE — TELEPHONE ENCOUNTER
Please notify patient - Referral has been placed for physical therapy and orthopedic surgery.  I have referred her to Liya as she requested.  I referred her to [facility] for possible injection.  Recommend she start therapy before going as often they want patient to complete therapy prior to injections.

## 2019-12-18 ENCOUNTER — TELEPHONE (OUTPATIENT)
Dept: FAMILY MEDICINE CLINIC | Facility: CLINIC | Age: 49
End: 2019-12-18

## 2019-12-18 NOTE — TELEPHONE ENCOUNTER
Patient dropped off FMLA forms that need to have a correction made to them if possible. Her leave needs changed to intermittent because of her knee injections. Areas where change is needed are highlighted. Please call patient when ready for  [phone]. Form placed in Jaronen's folder.

## 2019-12-27 ENCOUNTER — TELEPHONE (OUTPATIENT)
Dept: FAMILY MEDICINE CLINIC | Facility: CLINIC | Age: 49
End: 2019-12-27

## 2019-12-27 NOTE — TELEPHONE ENCOUNTER
ARMIDAM for pt informing her that her paperwork has been updated and faxed back to [facility]. If she would like for us to mail or have it ready for her to  for her records

## 2020-01-17 ENCOUNTER — DOCUMENTATION (OUTPATIENT)
Dept: PHYSICAL THERAPY | Facility: HOSPITAL | Age: 50
End: 2020-01-17

## 2020-01-17 DIAGNOSIS — M25.561 ACUTE PAIN OF RIGHT KNEE: Primary | ICD-10-CM

## 2020-01-17 NOTE — THERAPY DISCHARGE NOTE
Outpatient Physical Therapy Discharge Summary         Patient Name: Haylee Hayes  : 1970  MRN: [id number]    Today's Date: 2020    Visit Dx:    ICD-10-CM ICD-9-CM   1. Acute pain of right knee M25.561 719.46       PT OP Goals     Row Name 20 1518          PT Short Term Goals    STG Date to Achieve  19  -AC     STG 1  Decrease R knee pain by > or = 50%.  -AC     STG 1 Progress  Not Met  -AC     STG 2  Perform independent HEP to maximize stability/strength of knee and decrease pain.  -AC     STG 2 Progress  Not Met  -AC     STG 3  Improve R knee flexion AROM to at least 120 deg.  -AC     STG 3 Progress  Not Met  -AC     STG 4  Enable ability to perform 10 full bilateral heel raises without difficulty.  -AC     STG 4 Progress  Not Met  -AC        Long Term Goals    LTG Date to Achieve  19  -AC     LTG 1  Decrease R knee pain by > or = 75%.  -AC     LTG 1 Progress  Not Met  -AC     LTG 2  Improve LEFS to > or = 40/80.  -AC     LTG 2 Progress  Not Met  -AC     LTG 3  Enable ability to go up/down stairs with normal gait pattern with < or = 3/10 pain.  -AC     LTG 3 Progress  Not Met  -AC     LTG 4  Improve RLE strength to grossly 4+/5.  -AC     LTG 4 Progress  Not Met  -AC     LTG 5  Enable ability to return to full work duty and perform home/community ambulation with minimal difficulty < or = 3/10 pain.  -AC     LTG 5 Progress  Not Met  -AC        Time Calculation    PT Goal Re-Cert Due Date  20  -AC       User Key  (r) = Recorded By, (t) = Taken By, (c) = Cosigned By    Initials Name Provider Type    AC Mercedes Anguiano, PT Physical Therapist          OP PT Discharge Summary  Date of Discharge: 20  Reason for Discharge: Non-compliant  Outcomes Achieved: Unable to make functional progress toward goals at this time  Discharge Destination: Home with home program, Home without follow-up      Time Calculation:   Start Time:                 Mercedes Anguiano  PT  1/17/2020

## 2020-01-25 DIAGNOSIS — Z00.00 PHYSICAL EXAM, ANNUAL: Primary | ICD-10-CM

## 2020-01-27 ENCOUNTER — OFFICE VISIT (OUTPATIENT)
Dept: FAMILY MEDICINE CLINIC | Facility: CLINIC | Age: 50
End: 2020-01-27

## 2020-01-27 VITALS
BODY MASS INDEX: 42.17 KG/M2 | HEART RATE: 73 BPM | DIASTOLIC BLOOD PRESSURE: 78 MMHG | OXYGEN SATURATION: 97 % | SYSTOLIC BLOOD PRESSURE: 118 MMHG | HEIGHT: 66 IN | WEIGHT: 262.4 LBS

## 2020-01-27 DIAGNOSIS — G89.29 OTHER CHRONIC PAIN: Chronic | ICD-10-CM

## 2020-01-27 DIAGNOSIS — F41.8 DEPRESSION WITH ANXIETY: ICD-10-CM

## 2020-01-27 DIAGNOSIS — E66.01 MORBID OBESITY (HCC): ICD-10-CM

## 2020-01-27 DIAGNOSIS — Z01.419 WELL FEMALE EXAM WITH ROUTINE GYNECOLOGICAL EXAM: ICD-10-CM

## 2020-01-27 DIAGNOSIS — M25.50 MULTIPLE JOINT PAIN: ICD-10-CM

## 2020-01-27 DIAGNOSIS — Z12.39 BREAST CANCER SCREENING: ICD-10-CM

## 2020-01-27 DIAGNOSIS — G89.4 CHRONIC PAIN SYNDROME: ICD-10-CM

## 2020-01-27 DIAGNOSIS — F33.41 RECURRENT MAJOR DEPRESSIVE DISORDER, IN PARTIAL REMISSION (HCC): Chronic | ICD-10-CM

## 2020-01-27 DIAGNOSIS — Z00.00 PHYSICAL EXAM, ANNUAL: Primary | ICD-10-CM

## 2020-01-27 PROCEDURE — 99396 PREV VISIT EST AGE 40-64: CPT | Performed by: PHYSICIAN ASSISTANT

## 2020-01-27 RX ORDER — GABAPENTIN 300 MG/1
300 CAPSULE ORAL 3 TIMES DAILY
Qty: 90 CAPSULE | Refills: 2 | Status: SHIPPED | OUTPATIENT
Start: 2020-01-27 | End: 2020-04-21 | Stop reason: SDUPTHER

## 2020-01-27 RX ORDER — BUPROPION HYDROCHLORIDE 150 MG/1
150 TABLET ORAL DAILY
Qty: 180 TABLET | Refills: 3 | Status: SHIPPED | OUTPATIENT
Start: 2020-01-27 | End: 2020-03-25 | Stop reason: SDUPTHER

## 2020-01-27 RX ORDER — CITALOPRAM 40 MG/1
40 TABLET ORAL DAILY
Qty: 90 TABLET | Refills: 3 | Status: SHIPPED | OUTPATIENT
Start: 2020-01-27 | End: 2021-01-28

## 2020-01-27 NOTE — PATIENT INSTRUCTIONS
"Return Monday to Friday between 8 am and 4:30 pm for fasting labs (only water or black coffee for at least 8 hours).    Caty Arriaga - [facility]    General Health Maintenance:  -Yearly dermatology exam  -Yearly eye exam if you are diabetic, otherwise every 2 years for patients without diabetes  -Dental exams/cleanings at least twice a year  -Staying current on your required colonoscopy, starts at age 50 unless there are other indications prior  -Regular pap smears (at least every 1-3 years until age 65)  -Yearly pelvic and breast exams  -Yearly mammograms starting at age 40    Vaccines:  -Talk to pharmacist about shingrix shot  -Obtain flu shot when available  -Health department is recommending Hepatitis A vaccine    The largest impact you can have on your own health is getting the proper nutrition, exercise and maintaining an overall healthy body weight.  Proper nutrition involves eating lots of vegetables, fruit, minimal lean meat and avoiding processed foods and limiting refined sugars, carbohydrates and starches (\"the white stuff\").  Drinking at least 8 cups of water daily.  Walking at least 30 minutes a day and if possible, increasing this to a more cardiovascular aerobic exercise.  Maintaining a healthy body weight.      If you smoke or use tobacco products, quitting is extremely important and I am happy to discuss options with you regarding how to do this and what's available to assist you.    If you consume alcohol, limit your alcohol intake to 2 drinks a day for men and 1 drink a day for woman.    It is also important to make sure to keep regular appointments and have all general health maintenance items completed in a timely manner.      Thank you for entrusting me with your care.  It is a pleasure and honor to participate in your health.    "

## 2020-01-27 NOTE — PROGRESS NOTES
Patient Care Team:  Jennyfer Mauricio PA-C as PCP - General (Physician Assistant)     Chief complaint: Patient is in today for a physical     Hayleemarisol Hayes is a 49 y.o. female who presents for her yearly physical exam.     Patient is a pleasant 49-year-old white female who presents for routine annual physical exam.  She presents for management of her obesity, chronic joint pain and depression.  She is aware she needs to work on weight loss.  She has been trying to do intermittent fasting and watch her diet.  She is having difficulty exercising due to recent knee injuries and ongoing joint pain.  She has ongoing depression which is stable with celexa and wellbutrin.  She has not been to a psychiatrist and agrees with referral for further evaluation and medication management.  She denies any suicidal ideation.  She has ongoing chronic joint pain that has improved with gabapentin 300 mg TID.  She has not had a work-up for rheumatological disorders and would like lab work for this, which is reasonable.  She has completed colonoscopy.  Needs mammogram and to establish with gynecology for pap smear.  Has been 4 years since she has had a female wellness examination.  She is up-to-date on ophthalmology appointments.  Needs to make dental appointment.  Denies any skin lesions/moles.  She will ask pharmacist about shingrix vaccine, up-to-date on all other vaccinations.       Review of Systems   Constitutional: Positive for fatigue. Negative for chills, diaphoresis and fever.   HENT: Negative for congestion, ear pain, hearing loss, postnasal drip, rhinorrhea and sore throat.    Eyes: Negative for blurred vision and pain.   Respiratory: Negative for cough, shortness of breath and wheezing.    Cardiovascular: Negative for chest pain and leg swelling.   Gastrointestinal: Negative for abdominal pain, blood in stool, constipation, diarrhea, nausea, vomiting and indigestion.   Endocrine: Negative for polyuria.    Genitourinary: Negative for dysuria, flank pain and hematuria.   Musculoskeletal: Positive for arthralgias, back pain and myalgias. Negative for gait problem.   Skin: Negative for rash and skin lesions.   Neurological: Negative for dizziness and headache.   Psychiatric/Behavioral: Positive for behavioral problems, depressed mood and stress. Negative for self-injury, sleep disturbance and suicidal ideas. The patient is not nervous/anxious.         History  Past Medical History:   Diagnosis Date   • Acute respiratory failure with hypoxia (CMS/HCC) 2018   • Chest pain 2018   • Chronic pain 2018   • Depression    • DVT (deep venous thrombosis) (CMS/[facility])     left arm,  андрей   • Kidney stone     around early , has had some prior to that as well, passed all of them without surgery   • Obesity 2018   • Peptic ulceration    • Pneumonia     never had to be hospitalized for it      Past Surgical History:   Procedure Laterality Date   • APPENDECTOMY      age 15, they removed appendix while doing oopherectomy   •  SECTION     • CHOLECYSTECTOMY N/A 2018    Procedure: CHOLECYSTECTOMY LAPAROSCOPIC;  Surgeon: Edmund BEE MD;  Location:  [facility] OR;  Service: General   • ENDOSCOPY N/A 6/3/2018    Procedure: ESOPHAGOGASTRODUODENOSCOPY;  Surgeon: Mark I Brunner, MD;  Location:  [facility] ENDOSCOPY;  Service: Gastroenterology   • GASTRECTOMY      [facility]   • RIGHT OOPHORECTOMY      torque   • TONSILLECTOMY      age 17      Allergies   Allergen Reactions   • Coumadin [Warfarin Sodium] Anaphylaxis     Swelling of throat. LUE DVT in  when inpatient for unknown cause, did fine on heparin gtt, home on coumadin and anaphylaxis, completed and tolerated 6 months lovenox.   • Cymbalta [Duloxetine Hcl] Other (See Comments)     Worsening depression, CNS issues   • Paxil [Paroxetine Hcl] Other (See Comments)     Didn't tolerate   • Prozac [Fluoxetine Hcl] Other (See Comments)     Didn't  tolerate      Family History   Problem Relation Age of Onset   • Atrial fibrillation Mother    • Heart failure Mother    • Diabetes Father    • Stroke Father    • No Known Problems Son    • Coronary artery disease Maternal Grandmother    • Valvular heart disease Maternal Grandmother    • Tuberculosis Maternal Grandfather    • Tuberculosis Paternal Grandfather    • Breast cancer Neg Hx    • Ovarian cancer Neg Hx    • Cancer Neg Hx       Social History     Socioeconomic History   • Marital status:      Spouse name: Not on file   • Number of children: Not on file   • Years of education: Not on file   • Highest education level: Not on file   Tobacco Use   • Smoking status: Former Smoker     Packs/day: 0.25     Types: Cigarettes   • Smokeless tobacco: Never Used   • Tobacco comment: About a third a pack a day, quit 2/2018   Substance and Sexual Activity   • Alcohol use: Yes     Comment: about once a month 1-2 drinks at a dinner, no hx heavy use or abuse other than more social in college years   • Drug use: No   • Sexual activity: Defer   Social History Narrative    Mrs. Hayes is a 48 year old white  female. She lives in [place]. They have two sons. She works as a RN on a progressive floor at [employer], and is in school for her master's in management. She does not have an advanced directive.      Current Outpatient Medications on File Prior to Visit   Medication Sig Dispense Refill   • albuterol sulfate HFA (PROAIR HFA) 108 (90 Base) MCG/ACT inhaler Inhale 2 puffs Every 4 (Four) Hours As Needed for Wheezing. 18 g 0   • diclofenac (VOLTAREN) 1 % gel gel Apply 4 g topically to the appropriate area as directed 4 (Four) Times a Day As Needed (prn for pain). 60 tube 0   • gabapentin (NEURONTIN) 300 MG capsule Take 1 capsule by mouth 3 (Three) Times a Day. 90 capsule 1   • Multiple Vitamins-Minerals (MULTIVITAMIN ADULT PO) Take 1 capsule by mouth Daily.     • ondansetron ODT (ZOFRAN-ODT) 4 MG disintegrating tablet  Take 1 tablet by mouth Every 8 (Eight) Hours As Needed for Vomiting. 12 tablet 0   • pantoprazole (PROTONIX) 40 MG EC tablet Take 1 tablet by mouth 2 (Two) Times a Day Before Meals. 180 tablet 1   • [DISCONTINUED] buPROPion XL (WELLBUTRIN XL) 150 MG 24 hr tablet Take 2 tablets by mouth Daily. 14 tablet 0   • [DISCONTINUED] citalopram (CeleXA) 40 MG tablet Take 1 tablet by mouth Daily. 90 tablet 1     No current facility-administered medications on file prior to visit.        Results for orders placed or performed in visit on 19   Rubeola Antibody IgG   Result Value Ref Range    Rubeola IgG Positive    Mumps Antibody, IgG   Result Value Ref Range    Mumps IgG Positive (A) Negative   Rubella Antibody, IgG   Result Value Ref Range    Rubella Antibodies, IgG Positive        Health Maintenance   Topic Date Due   • TDAP/TD VACCINES (1 - Tdap) 1980   • PAP SMEAR  2018   • ZOSTER VACCINE (1 of 2) 2019   • MAMMOGRAM  2020   • ANNUAL PHYSICAL  2021   • COLONOSCOPY  10/03/2024   • INFLUENZA VACCINE  Completed       Immunizations  Td/Tdap(Booster Q 10 yrs):  Completed  Flu (Yearly):  Completed  Pneumovax (1 yr after Prevnar):  N/A  [id number] (1 yr after Pneumo):  N/A  Hep B:  Completed  Shringrix:  Discussed Today}   Immunization History   Administered Date(s) Administered   • Influenza, Unspecified 10/01/2012   • Td 2011         Diabetes:  No   Eye Exam: N/A   Foot Exam:  N/A  Obesity Counseling:  N/A  Hemoglobin A1C   Date Value Ref Range Status   2019 5.50 4.80 - 5.60 % Final       Patient's Body mass index is 42.48 kg/m². BMI is above normal parameters. Recommendations include: exercise counseling and nutrition counseling.      Colorectal Screening:  Complete  Pap:  referral to gynecology  Mammogram:  Ordered Today  PSA(Over age 50):  N/A  US Aorta (For male smokers, age 65):  N/A  CT for Smoker (Age 55-75, 30pk yr):  N/A  Bone Density/DEXA:  N/A  Hep C ( 4519-6783):   "N/A      Results for orders placed or performed in visit on 11/12/19   Rubeola Antibody IgG   Result Value Ref Range    Rubeola IgG Positive    Mumps Antibody, IgG   Result Value Ref Range    Mumps IgG Positive (A) Negative   Rubella Antibody, IgG   Result Value Ref Range    Rubella Antibodies, IgG Positive             Vitals:    01/27/20 0837   BP: 118/78   Pulse: 73   SpO2: 97%   Weight: 119 kg (262 lb 6.4 oz)   Height: 167.4 cm (65.9\")       Body mass index is 42.48 kg/m².    Physical Exam   Constitutional: She is oriented to person, place, and time. Vital signs are normal. She appears well-developed and well-nourished. She is active and cooperative. She does not appear ill. No distress. She is morbidly obese.  HENT:   Head: Normocephalic and atraumatic.   Right Ear: Hearing, tympanic membrane, external ear and ear canal normal.   Left Ear: Hearing, tympanic membrane, external ear and ear canal normal.   Nose: Nose normal. Right sinus exhibits no maxillary sinus tenderness and no frontal sinus tenderness. Left sinus exhibits no maxillary sinus tenderness and no frontal sinus tenderness.   Mouth/Throat: Uvula is midline, oropharynx is clear and moist and mucous membranes are normal.   Eyes: Conjunctivae, EOM and lids are normal.   Neck: Trachea normal, normal range of motion and phonation normal. No thyroid mass and no thyromegaly present.   Cardiovascular: Normal rate, regular rhythm and normal heart sounds.   Pulmonary/Chest: Effort normal and breath sounds normal.   Abdominal: Soft. Normal appearance and bowel sounds are normal. She exhibits no distension. There is no tenderness. There is no rigidity, no guarding and no CVA tenderness.   Musculoskeletal: Normal range of motion. She exhibits no edema.   Lymphadenopathy:     She has no cervical adenopathy.        Right cervical: No superficial cervical adenopathy present.       Left cervical: No superficial cervical adenopathy present.   Neurological: She is " alert and oriented to person, place, and time. She has normal strength and normal reflexes. Coordination and gait normal.   CN grossly intact   Skin: Skin is warm and intact. No rash noted. She is not diaphoretic. No cyanosis or erythema. Nails show no clubbing.   Psychiatric: She has a normal mood and affect. Her speech is normal and behavior is normal. Judgment and thought content normal. She is not actively hallucinating. Cognition and memory are normal. She is attentive.   Vitals reviewed.          Counseling provided on diet and nutrition, exercise, weight management, GERD, mental health concerns and shingrix vaccination.    Haylee was seen today for annual exam.    Diagnoses and all orders for this visit:    Physical exam, annual  PE is unremarkable  Preventative labs ordered  Colonoscopy is up-to-date  Mammogram ordered  Referral to gynecologist made  Dentist - needs appointment  Ophthalmologist - up-to-date  Dermatologist - denies any concerning skin lesions/moles  Flu completed, will ask pharmacist about shingrix      Morbid obesity (CMS/HCC)  Aware she needs to lose weight.  She is working on diet.  Having difficulty with exercise due to chronic pain and joint issues.  Consider saxenda in the future.    Recurrent major depressive disorder, in partial remission (CMS/HCC)  -     Ambulatory Referral to Psychiatry  Ongoing issues with depression.  No suicidal ideation.  Possible bipolar diagnosis that is not being treated appropriately.  Has been on celexa and wellbutrin for years.  Has had worsening depression when trying to switch medications in the last year.  Will refer to psychiatrist and patient is in agreement with this plan.  -     buPROPion XL (WELLBUTRIN XL) 150 MG 24 hr tablet; Take 1 tablet by mouth Daily.  -     citalopram (CeleXA) 40 MG tablet; Take 1 tablet by mouth Daily.    Multiple joint pain  -     EDILIA With / DsDNA, RNP, Sjogrens A / B, Rhodes; Future  -     Rheumatoid Factor; Future  -      Sedimentation Rate; Future  -     C-reactive Protein; Future  Ongoing chronic joint pain.  Has not been evaluated for rheumatological issues.  Will order labs.    Chronic pain syndrome  Taking gabapentin 300 mg TID.  Has been on this for awhile and it helps improve pain.  UDS, chanel and linden up-to-date.    Breast cancer screening  -     Mammo Screening Digital Tomosynthesis Bilateral With CAD; Future    Well female exam with routine gynecological exam  -     Ambulatory Referral to Gynecology       Jennyfer Mauricio PA-C   1/27/2020   10:39 AM

## 2020-01-28 DIAGNOSIS — F41.8 DEPRESSION WITH ANXIETY: ICD-10-CM

## 2020-01-28 RX ORDER — BUPROPION HYDROCHLORIDE 300 MG/1
TABLET ORAL
Qty: 30 TABLET | Refills: 5 | OUTPATIENT
Start: 2020-01-28

## 2020-03-16 ENCOUNTER — TELEPHONE (OUTPATIENT)
Dept: FAMILY MEDICINE CLINIC | Facility: CLINIC | Age: 50
End: 2020-03-16

## 2020-03-16 NOTE — TELEPHONE ENCOUNTER
PATIENT CALLED STATING SHARYN KRISHNAN CALLED HER AND TOLD HER NOT TO COME INTO HER OFFICE FOR HER APPOINTMENT ON MARCH 17TH, STATING SHE NEEDED TO GO TO  URGENT CARE AT [address] FOR COVID-TEST AND TOLD HER TO CALL [phone] PRIOR TO GOING.. PATIENT CALLED THAT NUMBER AND WAS ADVISED THAT SINCE SHE HAD NOT BEEN OUTSIDE OF THE [place], THEY ADVISED PATIENT TO HAVE SHARYN KRISHNAN CALL THEM [phone]     ADVISED PATIENT TO WAITTO HEAR BACK FROM SHARYN AND TO NOT JUST WALK INTO THE [facility] WITHOUT CALLING.\ PER PATIENT, SHE WAS TOLD BY [facility], OVER THE PHONE THAT THEY DID NOT MAKE APPTS AND THAT SHE DID NOT HAVE TO CALL FIRST WHICH IS DIFFERENT THAT WHAT SHARYN KRISHNAN ADVISED HER.      PATIENT STATED THAT  [facility] TOLD HER TO HAVE RATNA KRISHNAN CALL THEM REGARDING THIS PATIENT [phone] AND ADVISE THEM ABOUT WHETHER OR NOT TO TEST PATIENT     THEN CALL PATIENT AT:  [phone]

## 2020-03-17 PROCEDURE — 87635 SARS-COV-2 COVID-19 AMP PRB: CPT | Performed by: FAMILY MEDICINE

## 2020-03-17 NOTE — TELEPHONE ENCOUNTER
No answer, left message.  Spoke with our manager, Kathleen.  Advised that we are not testing for flu, strep or Covid 19 here.  If patient's have concern or symptoms relating to any of these conditions, they should present at [facility] clinic.  This is regardless of exposure or travel.  They will be assessed and appropriate tests will be determined at [facility].  Left patient a detailed message regarding this.    Okay for HUB to relay above message.

## 2020-03-17 NOTE — TELEPHONE ENCOUNTER
Has patient had COVID screening, an order needs to be placed for her to go to [facility] for the COVID swab

## 2020-03-24 ENCOUNTER — TELEPHONE (OUTPATIENT)
Dept: FAMILY MEDICINE CLINIC | Facility: CLINIC | Age: 50
End: 2020-03-24

## 2020-03-24 DIAGNOSIS — F41.8 DEPRESSION WITH ANXIETY: ICD-10-CM

## 2020-03-24 NOTE — TELEPHONE ENCOUNTER
Patient called and stated that refills are needed for the following prescription(s):    buPROPion XL (WELLBUTRIN XL) 150 MG 24 hr tablet, 2 pills a day    Pharmacy: [facility] on [address]-confirmed  PH: [phone]  FX: [phone]    Patient callback: [phone]    Pt stated that her prescription was changed by Dr. Mauricio in November to 2 pills a day but when she goes to [facility] they only give her enough for one pill a day.  Pt is out of medication.    Please advise.

## 2020-03-25 ENCOUNTER — TELEPHONE (OUTPATIENT)
Dept: FAMILY MEDICINE CLINIC | Facility: CLINIC | Age: 50
End: 2020-03-25

## 2020-03-25 RX ORDER — BUPROPION HYDROCHLORIDE 150 MG/1
150 TABLET ORAL 2 TIMES DAILY
Qty: 180 TABLET | Refills: 3 | Status: SHIPPED | OUTPATIENT
Start: 2020-03-25 | End: 2021-01-28

## 2020-03-25 NOTE — TELEPHONE ENCOUNTER
Yes patient should be taking twice a day.  Please update prescription in chart and call pharmacy with clarification.

## 2020-03-25 NOTE — TELEPHONE ENCOUNTER
PLEASE ADVISE ON HOW PATIENT SHOULD BE TAKING MEDICATION. FROM LOOKING IN PATIENTS CHART THE LAST SCRIPT SENT IN STATES TAKE ONE TABLET ONCE DAILY BUT IT HAS A QUANTITY . SHOULD THE PATIENT BE TAKING THIS TWICE DAILY?

## 2020-03-26 ENCOUNTER — TELEPHONE (OUTPATIENT)
Dept: FAMILY MEDICINE CLINIC | Facility: CLINIC | Age: 50
End: 2020-03-26

## 2020-04-21 ENCOUNTER — TELEMEDICINE (OUTPATIENT)
Dept: FAMILY MEDICINE CLINIC | Facility: CLINIC | Age: 50
End: 2020-04-21

## 2020-04-21 DIAGNOSIS — J32.9 SINUSITIS, UNSPECIFIED CHRONICITY, UNSPECIFIED LOCATION: Primary | ICD-10-CM

## 2020-04-21 DIAGNOSIS — G89.4 CHRONIC PAIN SYNDROME: Chronic | ICD-10-CM

## 2020-04-21 DIAGNOSIS — G89.29 OTHER CHRONIC PAIN: Chronic | ICD-10-CM

## 2020-04-21 DIAGNOSIS — F33.41 RECURRENT MAJOR DEPRESSIVE DISORDER, IN PARTIAL REMISSION (HCC): ICD-10-CM

## 2020-04-21 PROCEDURE — 99214 OFFICE O/P EST MOD 30 MIN: CPT | Performed by: PHYSICIAN ASSISTANT

## 2020-04-21 RX ORDER — GABAPENTIN 300 MG/1
300 CAPSULE ORAL 3 TIMES DAILY
Qty: 90 CAPSULE | Refills: 2 | Status: SHIPPED | OUTPATIENT
Start: 2020-04-21 | End: 2020-07-29

## 2020-04-21 RX ORDER — AMOXICILLIN 875 MG/1
875 TABLET, COATED ORAL EVERY 12 HOURS SCHEDULED
Qty: 20 TABLET | Refills: 0 | Status: SHIPPED | OUTPATIENT
Start: 2020-04-21 | End: 2020-05-01

## 2020-04-21 NOTE — PROGRESS NOTES
Chief Complaint   Patient presents with   • Sinusitis   • Follow-up       HPI      Haylee Hayes is a 49 y.o. female who presents for Sinusitis and Follow-up.  Patient presents for video visit for routine follow-up of depression, chronic joint pain syndrome and new onset sinusitis.  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  She reports stable mood with current medications.  No worsening anxiety or depression.  She has ongoing chronic joint pain that is well-controlled with gabapentin 300 mg TID and OTC medications.      She reports new onset right ear pain, frontal/maxillary facial pain and head congestion that started 3 days ago.  She had a fever last night of 101 and today it has improved.  She reports dry cough.  No shortness of breath.  Staying in hotel until fever resolves and is being tested for Covid-19 at the hospital.  No antibiotic allergies.  Taking allergy medications.      Past Medical History:   Diagnosis Date   • Acute respiratory failure with hypoxia (CMS/HCC) 2018   • Chest pain 2018   • Chronic pain 2018   • Depression    • DVT (deep venous thrombosis) (CMS/HCC)     left arm,  андрей   • Kidney stone     around early , has had some prior to that as well, passed all of them without surgery   • Obesity 2018   • Peptic ulceration    • Pneumonia     never had to be hospitalized for it       Past Surgical History:   Procedure Laterality Date   • APPENDECTOMY      age 15, they removed appendix while doing oopherectomy   •  SECTION     • CHOLECYSTECTOMY N/A 2018    Procedure: CHOLECYSTECTOMY LAPAROSCOPIC;  Surgeon: Edmund BEE MD;  Location:  [facility] OR;  Service: General   • ENDOSCOPY N/A 6/3/2018    Procedure: ESOPHAGOGASTRODUODENOSCOPY;  Surgeon: Mark I Brunner, MD;  Location: [facility];  Service: Gastroenterology   • GASTRECTOMY      [facility]   • RIGHT OOPHORECTOMY      torque   • TONSILLECTOMY      age  17       Family History   Problem Relation Age of Onset   • Atrial fibrillation Mother    • Heart failure Mother    • Diabetes Father    • Stroke Father    • No Known Problems Son    • Coronary artery disease Maternal Grandmother    • Valvular heart disease Maternal Grandmother    • Tuberculosis Maternal Grandfather    • Tuberculosis Paternal Grandfather    • Breast cancer Neg Hx    • Ovarian cancer Neg Hx    • Cancer Neg Hx        Social History     Socioeconomic History   • Marital status:      Spouse name: Not on file   • Number of children: Not on file   • Years of education: Not on file   • Highest education level: Not on file   Tobacco Use   • Smoking status: Former Smoker     Packs/day: 0.25     Types: Cigarettes   • Smokeless tobacco: Never Used   • Tobacco comment: About a third a pack a day, quit 2/2018   Substance and Sexual Activity   • Alcohol use: Yes     Comment: about once a month 1-2 drinks at a dinner, no hx heavy use or abuse other than more social in college years   • Drug use: No   • Sexual activity: Defer   Social History Narrative    Mrs. Hayes is a 48 year old white  female. She lives in [facility] [place] [facility]. They have two sons. 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 She does not have an advanced directive.       Allergies   Allergen Reactions   • Coumadin [Warfarin Sodium] Anaphylaxis     Swelling of throat. LUE DVT in 2005 when inpatient for unknown cause, did fine on heparin gtt, home on coumadin and anaphylaxis, completed and tolerated 6 months lovenox.   • Cymbalta [Duloxetine Hcl] Other (See Comments)     Worsening depression, CNS issues   • Paxil [Paroxetine Hcl] Other (See Comments)     Didn't tolerate   • Prozac [Fluoxetine Hcl] Other (See Comments)     Didn't tolerate       ROS    Review of Systems   Constitutional: Positive for fever. Negative for chills, diaphoresis and fatigue.   HENT: Positive for congestion, ear  pain, rhinorrhea and sinus pressure. Negative for postnasal drip and sore throat.    Respiratory: Positive for cough. Negative for shortness of breath and wheezing.    Cardiovascular: Negative for chest pain and leg swelling.   Neurological: Positive for headache. Negative for dizziness.   Psychiatric/Behavioral: Positive for stress. Negative for self-injury, sleep disturbance, suicidal ideas and depressed mood. The patient is not nervous/anxious.        There were no vitals filed for this visit.  There is no height or weight on file to calculate BMI.    Current Outpatient Medications on File Prior to Visit   Medication Sig Dispense Refill   • albuterol sulfate HFA (PROAIR HFA) 108 (90 Base) MCG/ACT inhaler Inhale 2 puffs Every 4 (Four) Hours As Needed for Wheezing. 18 g 0   • benzonatate (TESSALON) 200 MG capsule Take 1 capsule by mouth Every Night. 30 capsule 0   • buPROPion XL (WELLBUTRIN XL) 150 MG 24 hr tablet Take 1 tablet by mouth 2 (Two) Times a Day. 180 tablet 3   • citalopram (CeleXA) 40 MG tablet Take 1 tablet by mouth Daily. 90 tablet 3   • diclofenac (VOLTAREN) 1 % gel gel Apply 4 g topically to the appropriate area as directed 4 (Four) Times a Day As Needed (prn for pain). 60 tube 0   • gabapentin (NEURONTIN) 300 MG capsule Take 1 capsule by mouth 3 (Three) Times a Day. 90 capsule 2   • guaifenesin-dextromethorphan (MUCINEX DM)  MG tablet sustained-release 12 hour tablet Take 2 tablets by mouth 2 (Two) Times a Day As Needed (cough and congestion). 40 tablet 0   • Multiple Vitamins-Minerals (MULTIVITAMIN ADULT PO) Take 1 capsule by mouth Daily.     • ondansetron ODT (ZOFRAN-ODT) 4 MG disintegrating tablet Take 1 tablet by mouth Every 8 (Eight) Hours As Needed for Vomiting. 12 tablet 0   • pantoprazole (PROTONIX) 40 MG EC tablet Take 1 tablet by mouth 2 (Two) Times a Day Before Meals. 180 tablet 1   • [DISCONTINUED] azithromycin (ZITHROMAX Z-FLASH) 250 MG tablet Take 2 tablets the first day, then 1  tablet daily for 4 days. 6 tablet 0     No current facility-administered medications on file prior to visit.        Results for orders placed or performed during the hospital encounter of 03/17/20   SARS-CoV-2, YESICA ([facility]) - Swab, Nasopharynx   Result Value Ref Range    SARS-CoV-2, YESICA Not Detected Not Detected       PE    Physical Exam   Constitutional: She appears well-developed and well-nourished. She is active and cooperative.  Non-toxic appearance. No distress. She is morbidly obese.  HENT:   Head: Normocephalic and atraumatic.   Eyes: EOM are normal.   Neck: Normal range of motion.   Neurological: She is alert.   Skin: She is not diaphoretic.   Psychiatric: She has a normal mood and affect. Her speech is normal and behavior is normal. Judgment and thought content normal. She is not actively hallucinating. She is attentive.        A/P    Haylee was seen today for sinusitis and follow-up.    Diagnoses and all orders for this visit:    Sinusitis, unspecified chronicity, unspecified location  -     amoxicillin (AMOXIL) 875 MG tablet; Take 1 tablet by mouth Every 12 (Twelve) Hours for 10 days.    Chronic pain syndrome    Recurrent major depressive disorder, in partial remission (CMS/HCC)    Facial pain, right ear pain and head congestion with fever x3 days.  Will treat with amoxicillin.  Will be getting Covid-19 testing at hospital she is currently working at in [place].    She has ongoing chronic joint pain.  She takes gabapentin 300 mg TID and this helps with pain.  Has been taking medication for awhile and has no adverse effects.  MAURICIO, linden and contract up-to-date.     Mood is stable and well-controlled on current medications.     You have chosen to receive care through a telehealth visit.  Do you consent to use a video/audio connection for your medical care today? Yes      Plan of care reviewed with patient at the conclusion of today's visit. Education was provided regarding diagnosis, management and  any prescribed or recommended OTC medications.  Patient verbalizes understanding of and agreement with management plan.    No follow-ups on file.     Jennyfer Mauricio PA-C

## 2020-07-28 DIAGNOSIS — G89.29 OTHER CHRONIC PAIN: Chronic | ICD-10-CM

## 2020-07-29 RX ORDER — GABAPENTIN 300 MG/1
CAPSULE ORAL
Qty: 90 CAPSULE | Refills: 0 | Status: SHIPPED | OUTPATIENT
Start: 2020-07-29 | End: 2020-10-08 | Stop reason: SDUPTHER

## 2020-08-29 DIAGNOSIS — G89.29 OTHER CHRONIC PAIN: Chronic | ICD-10-CM

## 2020-08-31 RX ORDER — GABAPENTIN 300 MG/1
CAPSULE ORAL
Qty: 90 CAPSULE | Refills: 0 | OUTPATIENT
Start: 2020-08-31

## 2020-08-31 NOTE — TELEPHONE ENCOUNTER
Last fill: 7/29/20  Last office visit: 4/21/20  Next office visit: not scheduled  Date of last Albino: 4/21/20  CSA up-to-date? 9/13/19  Date of last UDS: 10/28/19  UDS consistent: consistent      Attempted to contact, no answer. LVM asking to contact us back to schedule an upcoming appt

## 2020-10-08 ENCOUNTER — TELEMEDICINE (OUTPATIENT)
Dept: FAMILY MEDICINE CLINIC | Facility: CLINIC | Age: 50
End: 2020-10-08

## 2020-10-08 DIAGNOSIS — G89.29 OTHER CHRONIC PAIN: Chronic | ICD-10-CM

## 2020-10-08 PROCEDURE — 99213 OFFICE O/P EST LOW 20 MIN: CPT | Performed by: PHYSICIAN ASSISTANT

## 2020-10-08 RX ORDER — GABAPENTIN 300 MG/1
300 CAPSULE ORAL 2 TIMES DAILY
Qty: 60 CAPSULE | Refills: 0 | Status: SHIPPED | OUTPATIENT
Start: 2020-10-08 | End: 2020-11-19 | Stop reason: SDUPTHER

## 2020-10-08 NOTE — PROGRESS NOTES
Chief Complaint   Patient presents with   • Pain       HPI     Haylee Hayes is a pleasant 50 y.o. female who is here for routine follow-up of medication for pain.  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  She is taking gabapentin 300 mg TID for chronic pain and this works well.  Patient has decreased her dose to BID as she started having nightmares.  Her nightmares have resolved and she is tolerating the 300 mg BID well.  She tried to taper off but was unable to tolerate the pain.    Past Medical History:   Diagnosis Date   • Acute respiratory failure with hypoxia (CMS/HCC) 2018   • Chest pain 2018   • Chronic pain 2018   • Depression    • DVT (deep venous thrombosis) (CMS/[facility])     left arm,  андрей   • Kidney stone     around early , has had some prior to that as well, passed all of them without surgery   • Obesity 2018   • Peptic ulceration    • Pneumonia     never had to be hospitalized for it       Past Surgical History:   Procedure Laterality Date   • APPENDECTOMY      age 15, they removed appendix while doing oopherectomy   •  SECTION     • CHOLECYSTECTOMY N/A 2018    Procedure: CHOLECYSTECTOMY LAPAROSCOPIC;  Surgeon: Edmund BEE MD;  Location:  [facility] OR;  Service: General   • ENDOSCOPY N/A 6/3/2018    Procedure: ESOPHAGOGASTRODUODENOSCOPY;  Surgeon: Mark I Brunner, MD;  Location:  [facility];  Service: Gastroenterology   • GASTRECTOMY      [facility]   • RIGHT OOPHORECTOMY      torque   • TONSILLECTOMY      age 17       Family History   Problem Relation Age of Onset   • Atrial fibrillation Mother    • Heart failure Mother    • Diabetes Father    • Stroke Father    • No Known Problems Son    • Coronary artery disease Maternal Grandmother    • Valvular heart disease Maternal Grandmother    • Tuberculosis Maternal Grandfather    • Tuberculosis Paternal Grandfather    • Breast cancer Neg Hx    •  Ovarian cancer Neg Hx    • Cancer Neg Hx        Social History     Socioeconomic History   • Marital status:      Spouse name: Not on file   • Number of children: Not on file   • Years of education: Not on file   • Highest education level: Not on file   Tobacco Use   • Smoking status: Former Smoker     Packs/day: 0.25     Types: Cigarettes   • Smokeless tobacco: Never Used   • Tobacco comment: About a third a pack a day, quit 2/2018   Substance and Sexual Activity   • Alcohol use: Yes     Comment: about once a month 1-2 drinks at a dinner, no hx heavy use or abuse other than more social in college years   • Drug use: No   • Sexual activity: Defer   Social History Narrative    Mrs. Hayes is a 48 year old white  female. She lives in [facility]. They have two sons. 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 She does not have an advanced directive.       Allergies   Allergen Reactions   • Coumadin [Warfarin Sodium] Anaphylaxis     Swelling of throat. LUE DVT in 2005 when inpatient for unknown cause, did fine on heparin gtt, home on coumadin and anaphylaxis, completed and tolerated 6 months lovenox.   • Cymbalta [Duloxetine Hcl] Other (See Comments)     Worsening depression, CNS issues   • Paxil [Paroxetine Hcl] Other (See Comments)     Didn't tolerate   • Prozac [Fluoxetine Hcl] Other (See Comments)     Didn't tolerate       ROS  Review of Systems   Constitutional: Negative for chills and fever.   Musculoskeletal: Positive for arthralgias and myalgias. Negative for gait problem.   Neurological: Negative for dizziness, weakness, headache and memory problem.       There were no vitals filed for this visit.  There is no height or weight on file to calculate BMI.    Current Outpatient Medications on File Prior to Visit   Medication Sig Dispense Refill   • albuterol sulfate HFA (PROAIR HFA) 108 (90 Base) MCG/ACT inhaler Inhale 2 puffs Every 4 (Four) Hours  As Needed for Wheezing. 18 g 0   • benzonatate (TESSALON) 200 MG capsule Take 1 capsule by mouth Every Night. 30 capsule 0   • buPROPion XL (WELLBUTRIN XL) 150 MG 24 hr tablet Take 1 tablet by mouth 2 (Two) Times a Day. 180 tablet 3   • citalopram (CeleXA) 40 MG tablet Take 1 tablet by mouth Daily. 90 tablet 3   • diclofenac (VOLTAREN) 1 % gel gel Apply 4 g topically to the appropriate area as directed 4 (Four) Times a Day As Needed (prn for pain). 60 tube 0   • gabapentin (NEURONTIN) 300 MG capsule TAKE 1 CAPSULE BY MOUTH THREE TIMES A DAY 90 capsule 0   • guaifenesin-dextromethorphan (MUCINEX DM)  MG tablet sustained-release 12 hour tablet Take 2 tablets by mouth 2 (Two) Times a Day As Needed (cough and congestion). 40 tablet 0   • Multiple Vitamins-Minerals (MULTIVITAMIN ADULT PO) Take 1 capsule by mouth Daily.     • ondansetron ODT (ZOFRAN-ODT) 4 MG disintegrating tablet Take 1 tablet by mouth Every 8 (Eight) Hours As Needed for Vomiting. 12 tablet 0   • pantoprazole (PROTONIX) 40 MG EC tablet Take 1 tablet by mouth 2 (Two) Times a Day Before Meals. 180 tablet 1     No current facility-administered medications on file prior to visit.        Results for orders placed or performed during the hospital encounter of 03/17/20   SARS-CoV-2, YESICA ([facility]) - Swab, Nasopharynx    Specimen: Nasopharynx; Swab   Result Value Ref Range    SARS-CoV-2, YESICA Not Detected Not Detected       PE    Physical Exam  Vitals signs reviewed.   Constitutional:       General: She is not in acute distress.     Appearance: Normal appearance. She is well-developed. She is obese. She is not ill-appearing or diaphoretic.   HENT:      Head: Normocephalic and atraumatic.   Eyes:      Extraocular Movements: Extraocular movements intact.      Conjunctiva/sclera: Conjunctivae normal.   Neck:      Musculoskeletal: Normal range of motion.   Pulmonary:      Effort: No respiratory distress.   Musculoskeletal: Normal range of motion.   Neurological:       General: No focal deficit present.      Mental Status: She is alert.   Psychiatric:         Attention and Perception: She is attentive.         Mood and Affect: Mood normal.         Speech: Speech normal.         Behavior: Behavior normal. Behavior is cooperative.         Thought Content: Thought content normal.         Judgment: Judgment normal.         A/P    Haylee was seen today for pain.    Diagnoses and all orders for this visit:    Other chronic pain  Patient is on gabapentin 300 mg TID for chronic pain.  This has worked well in controlling her pain for the last several years.  She started experiencing nightmares and reduced dose to BID and her nightmares resolved.  She tried to discontinue gabapentin but was unable to tolerate the pain.  Currently taking gabapentin 300 mg BID and tolerating well.  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  MAURICIO and shan up-to-date.  Contract needs to be renewed.  Will provide 30 day refill and patient will need to be seen in office prior to any more refills.    You have chosen to receive care through a telehealth visit.  Do you consent to use a video/audio connection for your medical care today? Yes     This patient is on a controlled substance which improves symptoms/quality of life and is aware of the risks, benefits and possible side-effects current treatment. The patient denies any medication side-effects at this time. A controlled substance agreement will be obtained or is currently on file. We reviewed required monitoring for controlled substances including but not limited to quarterly follow-up visits, annual depression screening, and urine drug screens to which the patient is agreeable. A SHAN report has been or shortly will be reviewed. There are no signs of deviation or misuse.      Plan of care reviewed with patient at the conclusion of today's visit. Education was provided regarding diagnosis, management and any  prescribed or recommended OTC medications.  Patient verbalizes understanding of and agreement with management plan.    Return in about 3 months (around 1/8/2021) for Recheck.     Jennyfer Mauricio PA-C

## 2020-11-19 ENCOUNTER — RESULTS ENCOUNTER (OUTPATIENT)
Dept: FAMILY MEDICINE CLINIC | Facility: CLINIC | Age: 50
End: 2020-11-19

## 2020-11-19 ENCOUNTER — OFFICE VISIT (OUTPATIENT)
Dept: FAMILY MEDICINE CLINIC | Facility: CLINIC | Age: 50
End: 2020-11-19

## 2020-11-19 VITALS
BODY MASS INDEX: 45.48 KG/M2 | OXYGEN SATURATION: 97 % | WEIGHT: 273 LBS | SYSTOLIC BLOOD PRESSURE: 104 MMHG | HEIGHT: 65 IN | HEART RATE: 78 BPM | DIASTOLIC BLOOD PRESSURE: 74 MMHG

## 2020-11-19 DIAGNOSIS — R10.9 FLANK PAIN: ICD-10-CM

## 2020-11-19 DIAGNOSIS — R41.3 MEMORY CHANGES: ICD-10-CM

## 2020-11-19 DIAGNOSIS — Z51.81 ENCOUNTER FOR THERAPEUTIC DRUG MONITORING: ICD-10-CM

## 2020-11-19 DIAGNOSIS — M70.62 TROCHANTERIC BURSITIS OF LEFT HIP: ICD-10-CM

## 2020-11-19 DIAGNOSIS — G25.81 RLS (RESTLESS LEGS SYNDROME): ICD-10-CM

## 2020-11-19 DIAGNOSIS — G47.00 INSOMNIA, UNSPECIFIED TYPE: ICD-10-CM

## 2020-11-19 DIAGNOSIS — G89.4 CHRONIC PAIN SYNDROME: Primary | ICD-10-CM

## 2020-11-19 LAB
BILIRUB BLD-MCNC: NEGATIVE MG/DL
GLUCOSE UR STRIP-MCNC: NEGATIVE MG/DL
KETONES UR QL: NEGATIVE
LEUKOCYTE EST, POC: NEGATIVE
NITRITE UR-MCNC: NEGATIVE MG/ML
PH UR: 5.5 [PH] (ref 5–8)
PROT UR STRIP-MCNC: NEGATIVE MG/DL
RBC # UR STRIP: ABNORMAL /UL
SP GR UR: 1.02 (ref 1–1.03)
UROBILINOGEN UR QL: NORMAL

## 2020-11-19 PROCEDURE — 99214 OFFICE O/P EST MOD 30 MIN: CPT | Performed by: PHYSICIAN ASSISTANT

## 2020-11-19 PROCEDURE — 81003 URINALYSIS AUTO W/O SCOPE: CPT | Performed by: PHYSICIAN ASSISTANT

## 2020-11-19 RX ORDER — GABAPENTIN 400 MG/1
800 CAPSULE ORAL NIGHTLY
Qty: 60 CAPSULE | Refills: 2 | Status: SHIPPED | OUTPATIENT
Start: 2020-11-19 | End: 2020-11-19 | Stop reason: SDUPTHER

## 2020-11-19 RX ORDER — GABAPENTIN 400 MG/1
800 CAPSULE ORAL NIGHTLY
Qty: 60 CAPSULE | Refills: 2 | Status: SHIPPED | OUTPATIENT
Start: 2020-11-19 | End: 2021-02-01 | Stop reason: SDUPTHER

## 2020-11-19 RX ORDER — HYDROXYZINE HYDROCHLORIDE 25 MG/1
25 TABLET, FILM COATED ORAL NIGHTLY PRN
Qty: 90 TABLET | Refills: 1 | Status: SHIPPED | OUTPATIENT
Start: 2020-11-19 | End: 2021-05-20 | Stop reason: SDUPTHER

## 2020-11-19 NOTE — PROGRESS NOTES
Chief Complaint   Patient presents with   • Med Management       HPI     Haylee Hayes is a pleasant 50 y.o. female who is here for routine follow-up of chronic pain and restless leg syndrome.  Patient has been having difficulty falling asleep due to RLS.  She is taking gabapentin 600 mg at night and this is not helping.  She reports that the gabapentin is helping with her pain.  She has not tried horizont or Requip.  She took a tablet of hydroxyzine 25 mg last night and slept very well.     Patient has depression and reports stable mood currently with Celexa and Wellbutrin.  Has been on these medications for years.    Has noticed worsening left hip pain especially while lying on her left side at night.  Has not been to PT or ortho regarding this.    She is concerned about memory changes.  Feels she is not remembering and retaining information like she used to.  She does not feel that her depression is part of this.  She is very worried about this.  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  Has been on gabapentin for years and does not feel this is part of it.  Her father had dementia.    Past Medical History:   Diagnosis Date   • Acute respiratory failure with hypoxia (CMS/HCC) 2018   • Chest pain 2018   • Chronic pain 2018   • Depression    • DVT (deep venous thrombosis) (CMS/HCC)     left arm,  андрей   • Kidney stone     around early , has had some prior to that as well, passed all of them without surgery   • Obesity 2018   • Peptic ulceration    • Pneumonia     never had to be hospitalized for it       Past Surgical History:   Procedure Laterality Date   • APPENDECTOMY      age 15, they removed appendix while doing oopherectomy   •  SECTION     • CHOLECYSTECTOMY N/A 2018    Procedure: CHOLECYSTECTOMY LAPAROSCOPIC;  Surgeon: Edmund BEE MD;  [facility];  Service: General   • ENDOSCOPY N/A 6/3/2018    Procedure:  ESOPHAGOGASTRODUODENOSCOPY;  Surgeon: Mark I Brunner, MD;  Location: [facility] ENDOSCOPY;  Service: Gastroenterology   • GASTRECTOMY      [facility]   • RIGHT OOPHORECTOMY      torque   • TONSILLECTOMY      age 17       Family History   Problem Relation Age of Onset   • Atrial fibrillation Mother    • Heart failure Mother    • Diabetes Father    • Stroke Father    • No Known Problems Son    • Coronary artery disease Maternal Grandmother    • Valvular heart disease Maternal Grandmother    • Tuberculosis Maternal Grandfather    • Tuberculosis Paternal Grandfather    • Breast cancer Neg Hx    • Ovarian cancer Neg Hx    • Cancer Neg Hx        Social History     Socioeconomic History   • Marital status:      Spouse name: Not on file   • Number of children: Not on file   • Years of education: Not on file   • Highest education level: Not on file   Tobacco Use   • Smoking status: Former Smoker     Packs/day: 0.25     Types: Cigarettes   • Smokeless tobacco: Never Used   • Tobacco comment: About a third a pack a day, quit 2/2018   Substance and Sexual Activity   • Alcohol use: Yes     Comment: about once a month 1-2 drinks at a dinner, no hx heavy use or abuse other than more social in college years   • Drug use: No   • Sexual activity: Defer   Social History Narrative    Mrs. Hayes is a 48 year old white  female. She lives in [place]. They have two sons. 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 She does not have an advanced directive.       Allergies   Allergen Reactions   • Coumadin [Warfarin Sodium] Anaphylaxis     Swelling of throat. LUE DVT in 2005 when inpatient for unknown cause, did fine on heparin gtt, home on coumadin and anaphylaxis, completed and tolerated 6 months lovenox.   • Cymbalta [Duloxetine Hcl] Other (See Comments)     Worsening depression, CNS issues   • Paxil [Paroxetine Hcl] Other (See Comments)     Didn't tolerate  "  • Prozac [Fluoxetine Hcl] Other (See Comments)     Didn't tolerate       ROS  Review of Systems   Constitutional: Positive for fatigue. Negative for chills, diaphoresis and fever.   HENT: Negative for congestion, postnasal drip and rhinorrhea.    Respiratory: Negative for cough, shortness of breath and wheezing.    Cardiovascular: Negative for chest pain and leg swelling.   Musculoskeletal: Positive for arthralgias, back pain, myalgias and bursitis. Negative for gait problem.   Neurological: Positive for memory problem. Negative for dizziness, headache and confusion.   Psychiatric/Behavioral: Positive for decreased concentration, sleep disturbance and stress. Negative for agitation, self-injury, suicidal ideas and depressed mood. The patient is not nervous/anxious.        Vitals:    11/19/20 1513   BP: 104/74   Pulse: 78   SpO2: 97%   Weight: 124 kg (273 lb)   Height: 165.1 cm (65\")   PainSc:   4   PainLoc: Leg  Comment: right leg pain     Body mass index is 45.43 kg/m².    Current Outpatient Medications on File Prior to Visit   Medication Sig Dispense Refill   • albuterol sulfate HFA (PROAIR HFA) 108 (90 Base) MCG/ACT inhaler Inhale 2 puffs Every 4 (Four) Hours As Needed for Wheezing. 18 g 0   • benzonatate (TESSALON) 200 MG capsule Take 1 capsule by mouth Every Night. 30 capsule 0   • buPROPion XL (WELLBUTRIN XL) 150 MG 24 hr tablet Take 1 tablet by mouth 2 (Two) Times a Day. 180 tablet 3   • citalopram (CeleXA) 40 MG tablet Take 1 tablet by mouth Daily. 90 tablet 3   • diclofenac (VOLTAREN) 1 % gel gel Apply 4 g topically to the appropriate area as directed 4 (Four) Times a Day As Needed (prn for pain). 60 tube 0   • guaifenesin-dextromethorphan (MUCINEX DM)  MG tablet sustained-release 12 hour tablet Take 2 tablets by mouth 2 (Two) Times a Day As Needed (cough and congestion). 40 tablet 0   • Multiple Vitamins-Minerals (MULTIVITAMIN ADULT PO) Take 1 capsule by mouth Daily.     • ondansetron ODT " (ZOFRAN-ODT) 4 MG disintegrating tablet Take 1 tablet by mouth Every 8 (Eight) Hours As Needed for Vomiting. 12 tablet 0   • pantoprazole (PROTONIX) 40 MG EC tablet Take 1 tablet by mouth 2 (Two) Times a Day Before Meals. 180 tablet 1   • [DISCONTINUED] gabapentin (NEURONTIN) 300 MG capsule Take 1 capsule by mouth 2 (two) times a day. 60 capsule 0     No current facility-administered medications on file prior to visit.        Results for orders placed or performed in visit on 11/19/20   POC Urinalysis Dipstick, Automated    Specimen: Urine   Result Value Ref Range    Specific Gravity  1.025 1.005 - 1.030    pH, Urine 5.5 5.0 - 8.0    Leukocytes Negative Negative    Nitrite, UA Negative Negative    Protein, POC Negative Negative mg/dL    Glucose, UA Negative Negative, 1000 mg/dL (3+) mg/dL    Ketones, UA Negative Negative    Urobilinogen, UA Normal Normal    Bilirubin Negative Negative    Blood, UA Small (A) Negative       PE    Physical Exam  Vitals signs reviewed.   Constitutional:       General: She is not in acute distress.     Appearance: Normal appearance. She is well-developed. She is obese. She is not ill-appearing or diaphoretic.   HENT:      Head: Normocephalic and atraumatic.   Eyes:      Extraocular Movements: Extraocular movements intact.      Conjunctiva/sclera: Conjunctivae normal.   Neck:      Musculoskeletal: Normal range of motion.   Pulmonary:      Effort: No respiratory distress.   Musculoskeletal: Normal range of motion.      Right lower leg: No edema.      Left lower leg: No edema.   Skin:     General: Skin is warm.      Findings: No erythema or rash.   Neurological:      General: No focal deficit present.      Mental Status: She is alert.   Psychiatric:         Attention and Perception: Attention and perception normal. She is attentive.         Mood and Affect: Mood and affect normal.         Speech: Speech normal.         Behavior: Behavior normal. Behavior is cooperative.         Thought  Content: Thought content normal.         Judgment: Judgment normal.         A/P    Diagnoses and all orders for this visit:    1. Chronic pain syndrome (Primary)  2.  RLS  -     Discontinue: gabapentin (NEURONTIN) 400 MG capsule; Take 2 capsules by mouth Every Night.  Dispense: 60 capsule; Refill: 2  -     gabapentin (NEURONTIN) 400 MG capsule; Take 2 capsules by mouth Every Night.  Dispense: 60 capsule; Refill: 2  Will increase gabapentin from 600 mg to 800 mg nightly to see if this improves RLS.  Gabapentin is working well for chronic pain but RLS has worsened recently.  May need to consider Horizont or Requip.    3. Encounter for therapeutic drug monitoring  -     Urine Drug Screen - Urine, Clean Catch; Future  UDS, linden and CSC updated today.    4. Insomnia, unspecified type  -     hydrOXYzine (ATARAX) 25 MG tablet; Take 1 tablet by mouth At Night As Needed for Anxiety.  Dispense: 90 tablet; Refill: 1  Okay to take hydroxyzine nightly to help with anxiety and sleep.    5. Flank pain  -     POC Urinalysis Dipstick, Automated  Started 3 days ago, improving today.  Concerned for kidney stone.  Will check urine.    6. Trochanteric bursitis of left hip  Handout on stretches and recommendations given to patient.  If pain persists, would recommend hip x-ray and referral to ortho.    7. Memory changes  -     Ambulatory Referral to Neurology  Having issues with recall and retention.  Affecting her work as a traveling nurse.  Aware that stress, depression, lack of sleep and gabapentin can all negatively impact her memory.  She feels that this is due to something more. Her father had dementia.  Will refer to neuro for evaluation and recommendations.       Plan of care reviewed with patient at the conclusion of today's visit. Education was provided regarding diagnosis, management and any prescribed or recommended OTC medications.  Patient verbalizes understanding of and agreement with management plan.    Return in about 3  months (around 2/19/2021) for Annual physical.     Jennyfer Mauricio PA-C

## 2021-01-27 DIAGNOSIS — F41.8 DEPRESSION WITH ANXIETY: ICD-10-CM

## 2021-01-27 NOTE — TELEPHONE ENCOUNTER
LOV: 11/19/2020  NOV: 01/28/2021  Last fill (Celexa):  01/27/2020  Last fill (Wellbutrin): 03/25/2020

## 2021-01-28 RX ORDER — BUPROPION HYDROCHLORIDE 150 MG/1
TABLET ORAL
Qty: 30 TABLET | Refills: 1 | Status: SHIPPED | OUTPATIENT
Start: 2021-01-28 | End: 2021-02-25 | Stop reason: SDUPTHER

## 2021-01-28 RX ORDER — CITALOPRAM 40 MG/1
TABLET ORAL
Qty: 30 TABLET | Refills: 1 | Status: SHIPPED | OUTPATIENT
Start: 2021-01-28 | End: 2021-02-25 | Stop reason: SDUPTHER

## 2021-02-01 ENCOUNTER — OFFICE VISIT (OUTPATIENT)
Dept: FAMILY MEDICINE CLINIC | Facility: CLINIC | Age: 51
End: 2021-02-01

## 2021-02-01 VITALS
HEART RATE: 84 BPM | OXYGEN SATURATION: 97 % | DIASTOLIC BLOOD PRESSURE: 78 MMHG | SYSTOLIC BLOOD PRESSURE: 113 MMHG | BODY MASS INDEX: 44.58 KG/M2 | HEIGHT: 65 IN | WEIGHT: 267.6 LBS

## 2021-02-01 DIAGNOSIS — Z12.4 SCREENING FOR CERVICAL CANCER: ICD-10-CM

## 2021-02-01 DIAGNOSIS — Z12.31 ENCOUNTER FOR SCREENING MAMMOGRAM FOR MALIGNANT NEOPLASM OF BREAST: ICD-10-CM

## 2021-02-01 DIAGNOSIS — G89.4 CHRONIC PAIN SYNDROME: ICD-10-CM

## 2021-02-01 DIAGNOSIS — M77.11 LATERAL EPICONDYLITIS OF RIGHT ELBOW: ICD-10-CM

## 2021-02-01 DIAGNOSIS — Z00.00 ANNUAL PHYSICAL EXAM: Primary | ICD-10-CM

## 2021-02-01 PROCEDURE — 99396 PREV VISIT EST AGE 40-64: CPT | Performed by: NURSE PRACTITIONER

## 2021-02-01 RX ORDER — GABAPENTIN 400 MG/1
400 CAPSULE ORAL 3 TIMES DAILY
Qty: 90 CAPSULE | Refills: 2 | Status: SHIPPED | OUTPATIENT
Start: 2021-02-01 | End: 2021-05-20 | Stop reason: SDUPTHER

## 2021-02-01 NOTE — PROGRESS NOTES
Haylee Hayes presents today for a physical    Chief Complaint   Patient presents with   • Annual Exam   • Arm Pain     Rt arm pain        HPI   Patient presents today for a physical.      She has had 2 weeks worth of right elbow pain.  She has iced it, but doesn't know if she should rest it or exercise it.  It feels like she has hit her funny bone, but when she goes to lift something it feels weak and tight.  She will feel a zing also.  She first noticed it when she went to  her coffee mug while she was driving.  She denies injury.  She also had some right neck pain that felt like a pinched nerve.  She has been waking up with her right hand asleep.  She has been trying to sleep with it raised up.      She is still having issues burning in 3 toes on her right foot.  She feels like this has been more of an issue since adjusting the dose of Gabapentin.   This was improved when she was taking the gabapentin 3 times a day versus just taking it once a day.  Her restless leg has done better with taking the larger dose of gabapentin at bedtime.    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  Her next assignment is in [place] in 2 weeks.  She lives at home with her significant other.    She typically exercises twice a week with a Nordic Track for 20 minutes at a time.      She has been eating healthy.  She gets plenty of fruits and veggies.      It has been a while since her last pap.  She has always had normal.  She would like a referral for this.      She had a mammogram last year at .  It was normal.  She gets them every 2 years.  She is performing self breast exams.    She had a colonoscopy in October 2019 and is due again in 2024.      She performs routine skin exams.  She wears sunscreen.     Her last dental exam was last year.  She brushes her teeth twice a day.  She flosses.    Her last eye exam was 18 months.  She feels like she needs new glasses.     Patient Active Problem List    Diagnosis   • Atypical chest pain   • Depression   • Morbid obesity (CMS/HCC)   • Chronic pain   • Acute respiratory failure with hypoxia (CMS/HCC)   • Nausea   • Epigastric abdominal tenderness with rebound tenderness   • Snoring   • RUQ abdominal pain   • LLQ abdominal pain   • H/O gastric bypass   • History of herpes zoster   • Insomnia   • Acute pain of right knee       Current Outpatient Medications   Medication Sig Dispense Refill   • albuterol sulfate HFA (PROAIR HFA) 108 (90 Base) MCG/ACT inhaler Inhale 2 puffs Every 4 (Four) Hours As Needed for Wheezing. 18 g 0   • buPROPion XL (WELLBUTRIN XL) 150 MG 24 hr tablet TAKE 1 TABLET BY MOUTH EVERY DAY 30 tablet 1   • citalopram (CeleXA) 40 MG tablet TAKE 1 TABLET BY MOUTH EVERY DAY 30 tablet 1   • gabapentin (NEURONTIN) 400 MG capsule Take 1 capsule by mouth 3 (Three) Times a Day. 90 capsule 2   • hydrOXYzine (ATARAX) 25 MG tablet Take 1 tablet by mouth At Night As Needed for Anxiety. 90 tablet 1   • Multiple Vitamins-Minerals (MULTIVITAMIN ADULT PO) Take 1 capsule by mouth Daily.       No current facility-administered medications for this visit.        Allergies   Allergen Reactions   • Coumadin [Warfarin Sodium] Anaphylaxis     Swelling of throat. LUE DVT in 2005 when inpatient for unknown cause, did fine on heparin gtt, home on coumadin and anaphylaxis, completed and tolerated 6 months lovenox.   • Cymbalta [Duloxetine Hcl] Other (See Comments)     Worsening depression, CNS issues   • Paxil [Paroxetine Hcl] Other (See Comments)     Didn't tolerate   • Prozac [Fluoxetine Hcl] Other (See Comments)     Didn't tolerate        Past Medical History:   Diagnosis Date   • Acute respiratory failure with hypoxia (CMS/HCC) 6/2/2018   • Chest pain 6/1/2018   • Chronic pain 6/1/2018   • Depression    • DVT (deep venous thrombosis) (CMS/[facility])     left arm, 2005 андрей   • Kidney stone     around early 2000s, has had some prior to that as well, passed all of them without  surgery   • Obesity 2018   • Peptic ulceration    • Pneumonia     never had to be hospitalized for it        Past Surgical History:   Procedure Laterality Date   • APPENDECTOMY      age 15, they removed appendix while doing oopherectomy   •  SECTION     • CHOLECYSTECTOMY N/A 2018    Procedure: CHOLECYSTECTOMY LAPAROSCOPIC;  Surgeon: Edmund BEE MD;  Location: [facility] OR;  Service: General   • ENDOSCOPY N/A 6/3/2018    Procedure: ESOPHAGOGASTRODUODENOSCOPY;  Surgeon: Mark I Brunner, MD;  Location: [facility] ENDOSCOPY;  Service: Gastroenterology   • GASTRECTOMY      [facility]   • RIGHT OOPHORECTOMY      torque   • TONSILLECTOMY      age 17        Family History   Problem Relation Age of Onset   • Atrial fibrillation Mother    • Heart failure Mother    • Diabetes Father    • Stroke Father    • No Known Problems Son    • Coronary artery disease Maternal Grandmother    • Valvular heart disease Maternal Grandmother    • Tuberculosis Maternal Grandfather    • Tuberculosis Paternal Grandfather    • Breast cancer Neg Hx    • Ovarian cancer Neg Hx    • Cancer Neg Hx         Social History     Socioeconomic History   • Marital status:      Spouse name: Not on file   • Number of children: Not on file   • Years of education: Not on file   • Highest education level: Not on file   Tobacco Use   • Smoking status: Former Smoker     Packs/day: 0.25     Types: Cigarettes   • Smokeless tobacco: Never Used   • Tobacco comment: About a third a pack a day, quit 2018   Substance and Sexual Activity   • Alcohol use: Yes     Comment: about once a month 1-2 drinks at a dinner, no hx heavy use or abuse other than more social in college years   • Drug use: No   • Sexual activity: Defer   Social History Narrative    Mrs. Hayes is a 48 year old white  female. She lives in [place]. They have two sons. 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 She does not have an advanced directive.        Vitals:    02/01/21 1248   BP: 113/78   BP Location: Left arm   Patient Position: Sitting   Cuff Size: Large Adult   Pulse: 84   SpO2: 97%   Weight: 121 kg (267 lb 9.6 oz)   Height: 165.1 cm (65\")   PainSc:   4   PainLoc: Arm      Body mass index is 44.53 kg/m².    Patient's Body mass index is 44.53 kg/m². BMI is above normal parameters. Recommendations include: exercise counseling and nutrition counseling.      Physical Exam  Vitals signs reviewed.   Constitutional:       Appearance: Normal appearance. She is well-developed. She is obese.   HENT:      Head: Normocephalic and atraumatic.      Right Ear: Tympanic membrane, ear canal and external ear normal.      Left Ear: Tympanic membrane, ear canal and external ear normal.      Nose: Nose normal.      Mouth/Throat:      Mouth: Mucous membranes are moist.      Pharynx: Oropharynx is clear.   Eyes:      General: Lids are normal. Lids are everted, no foreign bodies appreciated.      Extraocular Movements: Extraocular movements intact.      Conjunctiva/sclera: Conjunctivae normal.      Pupils: Pupils are equal, round, and reactive to light.   Neck:      Musculoskeletal: Normal range of motion and neck supple.      Thyroid: No thyroid mass or thyromegaly.      Vascular: No carotid bruit.      Trachea: Trachea normal.   Cardiovascular:      Rate and Rhythm: Normal rate and regular rhythm.      Pulses: Normal pulses.      Heart sounds: Normal heart sounds.   Pulmonary:      Effort: Pulmonary effort is normal.      Breath sounds: Normal breath sounds.   Abdominal:      General: Bowel sounds are normal.      Palpations: Abdomen is soft.   Musculoskeletal: Normal range of motion.      Comments: Pain with palpation of right lateral epicondyle.  Range of motion of right elbow intact.   Skin:     General: Skin is warm and dry.   Neurological:      General: No focal deficit present.      Mental Status: She is alert and " oriented to person, place, and time.      Deep Tendon Reflexes: Reflexes are normal and symmetric.      Comments: DTRs +1.   Psychiatric:         Mood and Affect: Mood normal.         Behavior: Behavior normal.         Thought Content: Thought content normal.         Judgment: Judgment normal.          Results for orders placed or performed in visit on 11/19/20   POC Urinalysis Dipstick, Automated    Specimen: Urine   Result Value Ref Range    Specific Gravity  1.025 1.005 - 1.030    pH, Urine 5.5 5.0 - 8.0    Leukocytes Negative Negative    Nitrite, UA Negative Negative    Protein, POC Negative Negative mg/dL    Glucose, UA Negative Negative, 1000 mg/dL (3+) mg/dL    Ketones, UA Negative Negative    Urobilinogen, UA Normal Normal    Bilirubin Negative Negative    Blood, UA Small (A) Negative        Immunization History   Administered Date(s) Administered   • COVID-19 (PFIZER) 01/11/2021   • Flu Vaccine Quad PF >18YRS 10/05/2020   • Flu Vaccine Quad PF >36MO 10/05/2020   • Influenza, Unspecified 10/01/2012   • Td 07/05/2011       Health Maintenance   Topic Date Due   • TDAP/TD VACCINES (1 - Tdap) 05/06/1988   • PAP SMEAR  06/01/2018   • ZOSTER VACCINE (1 of 2) 05/06/2019   • MAMMOGRAM  05/02/2020   • COLONOSCOPY  10/03/2024   • INFLUENZA VACCINE  Completed       Diagnoses and all orders for this visit:    1.  Annual physical -exam unremarkable.  Fasting labs of already been ordered by patient's PCP.  She will stop by the lab today to have these drawn.  She had her colonoscopy in October 2019 and will be due again in 2024.    2. Encounter for screening mammogram for malignant neoplasm of breast (Primary) -patient had a mammogram last year that was normal.  She states that she gets these every 2 years.  She does perform monthly self breast exams and encouraged patient to continue these.  If she notices anything abnormal, she is to notify provider.      3. Chronic pain syndrome -patient has noticed some neuropathy in 3  toes to her right foot.  She would like to adjust the dose of her Neurontin.  Will start Neurontin at 400 mg 3 times a day.  Discussed risk of increased drowsiness.  Patient verbalized understanding.  Patient had UDS performed in November.  CSA not seen on chart so obtained today.  Albino compliant.  -     gabapentin (NEURONTIN) 400 MG capsule; Take 1 capsule by mouth 3 (Three) Times a Day.  Dispense: 90 capsule; Refill: 2    3. Screening for cervical cancer -She is overdue for a Pap smear.  She would like to get established with gynecology.  Referral placed for this.  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  Patient verbalized understanding.  -     Ambulatory Referral to Gynecology    4. Lateral epicondylitis of right elbow -discussed with patient that her pain is caused by lateral epicondylitis.  Provided patient with educational material on ways to improve this including exercises and a brace.  Patient can continue to perform RICE.  If symptoms worsen or do not improve, return to clinic.    Discussed getting shingles shot.  Patient will wait until She receives her last COVID shot and see how she does and then consider the shingles shot.    Counseled on health maintenance topics and preventative care recommendations: Breast cancer screening, cervical cancer screening, colon cancer screening, fasting labs, healthy eating (avoid sweets and excessive intake of carbs), routine physical activity (150 minutes/week of moderate intensity exercise), monthly skin exams, sunscreen use, eye exams, dental exams.     Return in about 3 months (around 5/1/2021) for Video Visit with Jennyfer.    ASIA Flores

## 2021-02-01 NOTE — PATIENT INSTRUCTIONS
Tennis Elbow    Tennis elbow (lateral epicondylitis) is inflammation of tendons in your outer forearm, near your elbow. Tendons are tissues that connect muscle to bone. When you have tennis elbow, inflammation affects the tendons that you use to bend your wrist and move your hand up. Inflammation occurs in the lower part of the upper arm bone (humerus), where the tendons connect to the bone (lateral epicondyle).  Tennis elbow often affects people who play tennis, but anyone may get the condition from repeatedly extending the wrist or turning the forearm.  What are the causes?  This condition is usually caused by repeatedly extending the wrist, turning the forearm, and using the hands. It can result from sports or work that requires repetitive forearm movements. In some cases, it may be caused by a sudden injury.  What increases the risk?  You are more likely to develop tennis elbow if you play tennis or another racket sport. You also have a higher risk if you frequently use your hands for work. Besides people who play tennis, others at greater risk include:  · Musicians.  · , painters, and plumbers.  · Cooks.  · [place].  · People who work in factories.  · Construction workers.  · Butchers.  · People who use computers.  What are the signs or symptoms?  Symptoms of this condition include:  · Pain and tenderness in the forearm and the outer part of the elbow. Pain may be felt only when using the arm, or it may be there all the time.  · A burning feeling that starts in the elbow and spreads down the forearm.  · A weak  in the hand.  How is this diagnosed?  This condition may be diagnosed based on:  · Your symptoms and medical history.  · A physical exam.  · X-rays.  · MRI.  How is this treated?  Resting and icing your arm is often the first treatment. Your health care provider may also recommend:  · Medicines to reduce pain and inflammation. These may be in the form of a pill, topical gels, or shots of a  steroid medicine (cortisone).  · An elbow strap to reduce stress on the area.  · Physical therapy. This may include massage or exercises.  · An elbow brace to restrict the movements that cause symptoms.  If these treatments do not help relieve your symptoms, your health care provider may recommend surgery to remove damaged muscle and reattach healthy muscle to bone.  Follow these instructions at home:  Activity  · Rest your elbow and wrist and avoid activities that cause symptoms, as told by your health care provider.  · Do physical therapy exercises as instructed.  · If you lift an object, lift it with your palm facing up. This reduces stress on your elbow.  Lifestyle  · If your tennis elbow is caused by sports, check your equipment and make sure that:  ? You are using it correctly.  ? It is the best fit for you.  · If your tennis elbow is caused by work or computer use, take frequent breaks to stretch your arm. Talk with your manager about ways to manage your condition at work.  If you have a brace:  · Wear the brace or strap as told by your health care provider. Remove it only as told by your health care provider.  · Loosen the brace if your fingers tingle, become numb, or turn cold and blue.  · Keep the brace clean.  · If the brace is not waterproof, ask if you may remove it for bathing. If you must keep the brace on while bathing:  ? Do not let it get wet.  ? Cover it with a watertight covering when you take a bath or a shower.  General instructions    · If directed, put ice on the painful area:  ? Put ice in a plastic bag.  ? Place a towel between your skin and the bag.  ? Leave the ice on for 20 minutes, 2-3 times a day.  · Take over-the-counter and prescription medicines only as told by your health care provider.  · Keep all follow-up visits as told by your health care provider. This is important.  Contact a health care provider if:  · You have pain that gets worse or does not get better with  treatment.  · You have numbness or weakness in your forearm, hand, or fingers.  Summary  · Tennis elbow (lateral epicondylitis) is inflammation of tendons in your outer forearm, near your elbow.  · Common symptoms include pain and tenderness in your forearm and the outer part of your elbow.  · This condition is usually caused by repeatedly extending your wrist, turning your forearm, and using your hands.  · The first treatment is often resting and icing your arm to relieve symptoms. Further treatment may include taking medicine, getting physical therapy, wearing a brace or strap, or having surgery.  This information is not intended to replace advice given to you by your health care provider. Make sure you discuss any questions you have with your health care provider.  Document Revised: 09/13/2019 Document Reviewed: 10/02/2018  [employer] Patient Education © 2020 [employer]    Tennis Elbow Rehab  Ask your health care provider which exercises are safe for you. Do exercises exactly as told by your health care provider and adjust them as directed. It is normal to feel mild stretching, pulling, tightness, or discomfort as you do these exercises. Stop right away if you feel sudden pain or your pain gets worse. Do not begin these exercises until told by your health care provider.  Stretching and range-of-motion exercises  These exercises warm up your muscles and joints and improve the movement and flexibility of your elbow. These exercises also help to relieve pain, numbness, and tingling.  Wrist flexion, assisted    1. Straighten your left / right elbow in front of you with your palm facing down toward the floor.  ? If told by your health care provider, bend your left / right elbow to a 90-degree angle (right angle) at your side.  2. With your other hand, gently push over the back of your left / right hand so your fingers point toward the floor (flexion). Stop when you feel a gentle stretch on the back of your  forearm.  3. Hold this position for __________ seconds.  Repeat __________ times. Complete this exercise __________ times a day.  Wrist extension, assisted    1. Straighten your left / right elbow in front of you with your palm facing up toward the ceiling.  ? If told by your health care provider, bend your left / right elbow to a 90-degree angle (right angle) at your side.  2. With your other hand, gently pull your left / right hand and fingers toward the floor (extension). Stop when you feel a gentle stretch on the palm side of your forearm.  3. Hold this position for __________ seconds.  Repeat __________ times. Complete this exercise __________ times a day.  Assisted forearm rotation, supination  1. Sit or stand with your left / right elbow bent to a 90-degree angle (right angle) at your side.  2. Using your uninjured hand, turn (rotate) your left / right palm up toward the ceiling (supination) until you feel a gentle stretch along the inside of your forearm.  3. Hold this position for __________ seconds.  Repeat __________ times. Complete this exercise __________ times a day.  Assisted forearm rotation, pronation  1. Sit or stand with your left / right elbow bent to a 90-degree angle (right angle) at your side.  2. Using your uninjured hand, rotate your left / right palm down toward the floor (pronation) until you feel a gentle stretch along the outside of your forearm.  3. Hold this position for __________ seconds.  Repeat __________ times. Complete this exercise __________ times a day.  Strengthening exercises  These exercises build strength and endurance in your forearm and elbow. Endurance is the ability to use your muscles for a long time, even after they get tired.  Radial deviation    1. Stand with a __________ weight or a hammer in your left / right hand. Or, sit while holding a rubber exercise band or tubing, with your left / right forearm supported on a table or countertop.  ? If you are standing,  position your forearm so that your thumb is facing forward. If you are sitting, position your forearm so that the thumb is facing the ceiling. This is the neutral position.  2. Raise your hand upward in front of you so your thumb moves toward the ceiling (radial deviation), or pull up on the rubber tubing. Keep your forearm and elbow still while you move your wrist only.  3. Hold this position for __________ seconds.  4. Slowly return to the starting position.  Repeat __________ times. Complete this exercise __________ times a day.  Wrist extension, eccentric  1. Sit with your left / right forearm palm-down and supported on a table or other surface. Let your left / right wrist extend over the edge of the surface.  2. Hold a __________ weight or a piece of exercise band or tubing in your left / right hand.  ? If using a rubber exercise band or tubing, hold the other end of the tubing with your other hand.  3. Use your uninjured hand to move your left / right hand up toward the ceiling.  4. Take your uninjured hand away and slowly return to the starting position using only your left / right hand. Lowering your arm under tension is called eccentric extension.  Repeat __________ times. Complete this exercise __________ times a day.  Wrist extension  Do not do this exercise if it causes pain at the outside of your elbow. Only do this exercise once instructed by your health care provider.  1. Sit with your left / right forearm supported on a table or other surface and your palm turned down toward the floor. Let your left / right wrist extend over the edge of the surface.  2. Hold a __________ weight or a piece of rubber exercise band or tubing.  ? If you are using a rubber exercise band or tubing, hold the band or tubing in place with your other hand to provide resistance.  3. Slowly bend your wrist so your hand moves up toward the ceiling (extension). Move only your wrist, keeping your forearm and elbow still.  4. Hold  this position for __________ seconds.  5. Slowly return to the starting position.  Repeat __________ times. Complete this exercise __________ times a day.  Forearm rotation, supination  To do this exercise, you will need a lightweight hammer or rubber mallet.  1. Sit with your left / right forearm supported on a table or other surface. Bend your elbow to a 90-degree angle (right angle). Position your forearm so that your palm is facing down toward the floor, with your hand resting over the edge of the table.  2. Hold a hammer in your left / right hand.  ? To make this exercise easier, hold the hammer near the head of the hammer.  ? To make this exercise harder, hold the hammer near the end of the handle.  3. Without moving your wrist or elbow, slowly rotate your forearm so your palm faces up toward the ceiling (supination).  4. Hold this position for __________ seconds.  5. Slowly return to the starting position.  Repeat __________ times. Complete this exercise __________ times a day.  Shoulder blade squeeze  1. Sit in a stable chair or stand with good posture. If you are sitting down, do not let your back touch the back of the chair.  2. Your arms should be at your sides with your elbows bent to a 90-degree angle (right angle). Position your forearms so that your thumbs are facing the ceiling (neutral position).  3. Without lifting your shoulders up, squeeze your shoulder blades tightly together.  4. Hold this position for __________ seconds.  5. Slowly release and return to the starting position.  Repeat __________ times. Complete this exercise __________ times a day.  This information is not intended to replace advice given to you by your health care provider. Make sure you discuss any questions you have with your health care provider.  Document Revised: 04/09/2020 Document Reviewed: 02/11/2020  Elsevier Patient Education © 2020 [employer]

## 2021-02-05 ENCOUNTER — LAB (OUTPATIENT)
Dept: LAB | Facility: HOSPITAL | Age: 51
End: 2021-02-05

## 2021-02-05 DIAGNOSIS — Z13.0 SCREENING FOR DEFICIENCY ANEMIA: ICD-10-CM

## 2021-02-05 DIAGNOSIS — E55.9 VITAMIN D DEFICIENCY: ICD-10-CM

## 2021-02-05 DIAGNOSIS — Z13.29 SCREENING FOR THYROID DISORDER: ICD-10-CM

## 2021-02-05 DIAGNOSIS — Z11.59 ENCOUNTER FOR HEPATITIS C SCREENING TEST FOR LOW RISK PATIENT: ICD-10-CM

## 2021-02-05 DIAGNOSIS — Z13.220 SCREENING FOR CHOLESTEROL LEVEL: ICD-10-CM

## 2021-02-05 DIAGNOSIS — Z13.1 SCREENING FOR DIABETES MELLITUS: ICD-10-CM

## 2021-02-05 LAB
25(OH)D3 SERPL-MCNC: 15.3 NG/ML (ref 30–100)
ALBUMIN SERPL-MCNC: 3.9 G/DL (ref 3.5–5.2)
ALBUMIN/GLOB SERPL: 1.2 G/DL
ALP SERPL-CCNC: 112 U/L (ref 39–117)
ALT SERPL W P-5'-P-CCNC: 16 U/L (ref 1–33)
ANION GAP SERPL CALCULATED.3IONS-SCNC: 9.3 MMOL/L (ref 5–15)
AST SERPL-CCNC: 18 U/L (ref 1–32)
BASOPHILS # BLD AUTO: 0.04 10*3/MM3 (ref 0–0.2)
BASOPHILS NFR BLD AUTO: 0.6 % (ref 0–1.5)
BILIRUB SERPL-MCNC: 0.3 MG/DL (ref 0–1.2)
BUN SERPL-MCNC: 15 MG/DL (ref 6–20)
BUN/CREAT SERPL: 15.6 (ref 7–25)
CALCIUM SPEC-SCNC: 9 MG/DL (ref 8.6–10.5)
CHLORIDE SERPL-SCNC: 105 MMOL/L (ref 98–107)
CHOLEST SERPL-MCNC: 163 MG/DL (ref 0–200)
CO2 SERPL-SCNC: 27.7 MMOL/L (ref 22–29)
CREAT SERPL-MCNC: 0.96 MG/DL (ref 0.57–1)
DEPRECATED RDW RBC AUTO: 39.2 FL (ref 37–54)
EOSINOPHIL # BLD AUTO: 0.35 10*3/MM3 (ref 0–0.4)
EOSINOPHIL NFR BLD AUTO: 5.2 % (ref 0.3–6.2)
ERYTHROCYTE [DISTWIDTH] IN BLOOD BY AUTOMATED COUNT: 12.7 % (ref 12.3–15.4)
GFR SERPL CREATININE-BSD FRML MDRD: 61 ML/MIN/1.73
GLOBULIN UR ELPH-MCNC: 3.2 GM/DL
GLUCOSE SERPL-MCNC: 121 MG/DL (ref 65–99)
HCT VFR BLD AUTO: 44.2 % (ref 34–46.6)
HCV AB SER DONR QL: NORMAL
HDLC SERPL-MCNC: 40 MG/DL (ref 40–60)
HGB BLD-MCNC: 15.2 G/DL (ref 12–15.9)
IMM GRANULOCYTES # BLD AUTO: 0.02 10*3/MM3 (ref 0–0.05)
IMM GRANULOCYTES NFR BLD AUTO: 0.3 % (ref 0–0.5)
LDLC SERPL CALC-MCNC: 100 MG/DL (ref 0–100)
LDLC/HDLC SERPL: 2.44 {RATIO}
LYMPHOCYTES # BLD AUTO: 2.4 10*3/MM3 (ref 0.7–3.1)
LYMPHOCYTES NFR BLD AUTO: 36 % (ref 19.6–45.3)
MCH RBC QN AUTO: 29.5 PG (ref 26.6–33)
MCHC RBC AUTO-ENTMCNC: 34.4 G/DL (ref 31.5–35.7)
MCV RBC AUTO: 85.8 FL (ref 79–97)
MONOCYTES # BLD AUTO: 0.59 10*3/MM3 (ref 0.1–0.9)
MONOCYTES NFR BLD AUTO: 8.8 % (ref 5–12)
NEUTROPHILS NFR BLD AUTO: 3.27 10*3/MM3 (ref 1.7–7)
NEUTROPHILS NFR BLD AUTO: 49.1 % (ref 42.7–76)
NRBC BLD AUTO-RTO: 0 /100 WBC (ref 0–0.2)
PLATELET # BLD AUTO: 230 10*3/MM3 (ref 140–450)
PMV BLD AUTO: 10 FL (ref 6–12)
POTASSIUM SERPL-SCNC: 4.5 MMOL/L (ref 3.5–5.2)
PROT SERPL-MCNC: 7.1 G/DL (ref 6–8.5)
RBC # BLD AUTO: 5.15 10*6/MM3 (ref 3.77–5.28)
SODIUM SERPL-SCNC: 142 MMOL/L (ref 136–145)
TRIGL SERPL-MCNC: 127 MG/DL (ref 0–150)
TSH SERPL DL<=0.05 MIU/L-ACNC: 1.93 UIU/ML (ref 0.27–4.2)
VLDLC SERPL-MCNC: 23 MG/DL (ref 5–40)
WBC # BLD AUTO: 6.67 10*3/MM3 (ref 3.4–10.8)

## 2021-02-05 PROCEDURE — 85025 COMPLETE CBC W/AUTO DIFF WBC: CPT

## 2021-02-05 PROCEDURE — 36415 COLL VENOUS BLD VENIPUNCTURE: CPT

## 2021-02-05 PROCEDURE — 84443 ASSAY THYROID STIM HORMONE: CPT

## 2021-02-05 PROCEDURE — 80053 COMPREHEN METABOLIC PANEL: CPT

## 2021-02-05 PROCEDURE — 80061 LIPID PANEL: CPT

## 2021-02-05 PROCEDURE — 82306 VITAMIN D 25 HYDROXY: CPT

## 2021-02-05 PROCEDURE — 86803 HEPATITIS C AB TEST: CPT

## 2021-02-08 ENCOUNTER — TELEPHONE (OUTPATIENT)
Dept: FAMILY MEDICINE CLINIC | Facility: CLINIC | Age: 51
End: 2021-02-08

## 2021-02-08 DIAGNOSIS — R73.02 IMPAIRED GLUCOSE TOLERANCE: Primary | ICD-10-CM

## 2021-02-08 NOTE — TELEPHONE ENCOUNTER
I spoke with Haylee and explained the lab glucose and she will be coming in tomorrow for an A1c.

## 2021-02-08 NOTE — TELEPHONE ENCOUNTER
No answer, left message.  Was calling regarding labs.  Sent message to patient via Flipboard as well.

## 2021-02-25 DIAGNOSIS — F41.8 DEPRESSION WITH ANXIETY: ICD-10-CM

## 2021-02-25 RX ORDER — CITALOPRAM 40 MG/1
40 TABLET ORAL DAILY
Qty: 90 TABLET | Refills: 0 | Status: SHIPPED | OUTPATIENT
Start: 2021-02-25 | End: 2021-05-20 | Stop reason: SDUPTHER

## 2021-02-25 RX ORDER — BUPROPION HYDROCHLORIDE 150 MG/1
150 TABLET ORAL DAILY
Qty: 90 TABLET | Refills: 0 | Status: SHIPPED | OUTPATIENT
Start: 2021-02-25 | End: 2021-05-20 | Stop reason: SDUPTHER

## 2021-05-20 ENCOUNTER — TELEPHONE (OUTPATIENT)
Dept: FAMILY MEDICINE CLINIC | Facility: CLINIC | Age: 51
End: 2021-05-20

## 2021-05-20 ENCOUNTER — TELEMEDICINE (OUTPATIENT)
Dept: FAMILY MEDICINE CLINIC | Facility: CLINIC | Age: 51
End: 2021-05-20

## 2021-05-20 DIAGNOSIS — G47.00 INSOMNIA, UNSPECIFIED TYPE: ICD-10-CM

## 2021-05-20 DIAGNOSIS — F41.8 DEPRESSION WITH ANXIETY: ICD-10-CM

## 2021-05-20 DIAGNOSIS — G89.4 CHRONIC PAIN SYNDROME: ICD-10-CM

## 2021-05-20 PROCEDURE — 99213 OFFICE O/P EST LOW 20 MIN: CPT | Performed by: PHYSICIAN ASSISTANT

## 2021-05-20 RX ORDER — CITALOPRAM 40 MG/1
40 TABLET ORAL DAILY
Qty: 90 TABLET | Refills: 1 | Status: SHIPPED | OUTPATIENT
Start: 2021-05-20 | End: 2021-05-21 | Stop reason: SDUPTHER

## 2021-05-20 RX ORDER — HYDROXYZINE HYDROCHLORIDE 25 MG/1
25 TABLET, FILM COATED ORAL NIGHTLY PRN
Qty: 90 TABLET | Refills: 1 | Status: SHIPPED | OUTPATIENT
Start: 2021-05-20 | End: 2021-05-21 | Stop reason: SDUPTHER

## 2021-05-20 RX ORDER — GABAPENTIN 400 MG/1
400 CAPSULE ORAL 3 TIMES DAILY
Qty: 90 CAPSULE | Refills: 2 | Status: SHIPPED | OUTPATIENT
Start: 2021-05-20 | End: 2021-05-21 | Stop reason: SDUPTHER

## 2021-05-20 RX ORDER — BUPROPION HYDROCHLORIDE 150 MG/1
150 TABLET ORAL DAILY
Qty: 90 TABLET | Refills: 1 | Status: SHIPPED | OUTPATIENT
Start: 2021-05-20 | End: 2021-05-21 | Stop reason: SDUPTHER

## 2021-05-20 NOTE — PROGRESS NOTES
Chief Complaint   Patient presents with   • Med Refill       HPI     Haylee Hayes is a very pleasant 51 y.o. female who is here for routine follow-up of chronic pain syndrome, depression, anxiety and insomnia.  Patient is taking gabapentin 400 mg TID for chronic pain syndrome.  She has been on this medication for years and it does well to help manage her pain.  She has no adverse effects.    Her mood, anxiety and sleep are all stable on her current medications.      Past Medical History:   Diagnosis Date   • Acute respiratory failure with hypoxia (CMS/HCC) 2018   • Chest pain 2018   • Chronic pain 2018   • Depression    • DVT (deep venous thrombosis) (CMS/[facility])     left arm,  андрей   • Kidney stone     around early , has had some prior to that as well, passed all of them without surgery   • Obesity 2018   • Peptic ulceration    • Pneumonia     never had to be hospitalized for it       Past Surgical History:   Procedure Laterality Date   • APPENDECTOMY      age 15, they removed appendix while doing oopherectomy   •  SECTION     • CHOLECYSTECTOMY N/A 2018    Procedure: CHOLECYSTECTOMY LAPAROSCOPIC;  Surgeon: Edmund BEE MD;  Location:  [facility];  Service: General   • ENDOSCOPY N/A 6/3/2018    Procedure: ESOPHAGOGASTRODUODENOSCOPY;  Surgeon: Mark I Brunner, MD;  Location:  [facility];  Service: Gastroenterology   • GASTRECTOMY      [facility]   • RIGHT OOPHORECTOMY      torque   • TONSILLECTOMY      age 17       Family History   Problem Relation Age of Onset   • Atrial fibrillation Mother    • Heart failure Mother    • Diabetes Father    • Stroke Father    • No Known Problems Son    • Coronary artery disease Maternal Grandmother    • Valvular heart disease Maternal Grandmother    • Tuberculosis Maternal Grandfather    • Tuberculosis Paternal Grandfather    • Breast cancer Neg Hx    • Ovarian cancer Neg Hx    • Cancer Neg Hx        Social History      Socioeconomic History   • Marital status:      Spouse name: Not on file   • Number of children: Not on file   • Years of education: Not on file   • Highest education level: Not on file   Tobacco Use   • Smoking status: Former Smoker     Packs/day: 0.25     Types: Cigarettes   • Smokeless tobacco: Never Used   • Tobacco comment: About a third a pack a day, quit 2/2018   Substance and Sexual Activity   • Alcohol use: Yes     Comment: about once a month 1-2 drinks at a dinner, no hx heavy use or abuse other than more social in college years   • Drug use: No   • Sexual activity: Defer       Allergies   Allergen Reactions   • Coumadin [Warfarin Sodium] Anaphylaxis     Swelling of throat. LUE DVT in 2005 when inpatient for unknown cause, did fine on heparin gtt, home on coumadin and anaphylaxis, completed and tolerated 6 months lovenox.   • Cymbalta [Duloxetine Hcl] Other (See Comments)     Worsening depression, CNS issues   • Paxil [Paroxetine Hcl] Other (See Comments)     Didn't tolerate   • Prozac [Fluoxetine Hcl] Other (See Comments)     Didn't tolerate       ROS  Review of Systems   Musculoskeletal: Positive for arthralgias, back pain and myalgias.   Psychiatric/Behavioral: Positive for stress. Negative for agitation, sleep disturbance, suicidal ideas and depressed mood. The patient is not nervous/anxious.        There were no vitals filed for this visit.  There is no height or weight on file to calculate BMI.    Current Outpatient Medications on File Prior to Visit   Medication Sig Dispense Refill   • albuterol sulfate HFA (PROAIR HFA) 108 (90 Base) MCG/ACT inhaler Inhale 2 puffs Every 4 (Four) Hours As Needed for Wheezing. 18 g 0   • Multiple Vitamins-Minerals (MULTIVITAMIN ADULT PO) Take 1 capsule by mouth Daily.     • [DISCONTINUED] buPROPion XL (WELLBUTRIN XL) 150 MG 24 hr tablet Take 1 tablet by mouth Daily for 90 days. 90 tablet 0   • [DISCONTINUED] citalopram (CeleXA) 40 MG tablet Take 1 tablet by  mouth Daily for 90 days. 90 tablet 0   • [DISCONTINUED] gabapentin (NEURONTIN) 400 MG capsule Take 1 capsule by mouth 3 (Three) Times a Day. 90 capsule 2   • [DISCONTINUED] hydrOXYzine (ATARAX) 25 MG tablet Take 1 tablet by mouth At Night As Needed for Anxiety. 90 tablet 1     No current facility-administered medications on file prior to visit.       Results for orders placed or performed in visit on 02/05/21   CBC Auto Differential    Specimen: Blood   Result Value Ref Range    WBC 6.67 3.40 - 10.80 10*3/mm3    RBC 5.15 3.77 - 5.28 10*6/mm3    Hemoglobin 15.2 12.0 - 15.9 g/dL    Hematocrit 44.2 34.0 - 46.6 %    MCV 85.8 79.0 - 97.0 fL    MCH 29.5 26.6 - 33.0 pg    MCHC 34.4 31.5 - 35.7 g/dL    RDW 12.7 12.3 - 15.4 %    RDW-SD 39.2 37.0 - 54.0 fl    MPV 10.0 6.0 - 12.0 fL    Platelets 230 140 - 450 10*3/mm3    Neutrophil % 49.1 42.7 - 76.0 %    Lymphocyte % 36.0 19.6 - 45.3 %    Monocyte % 8.8 5.0 - 12.0 %    Eosinophil % 5.2 0.3 - 6.2 %    Basophil % 0.6 0.0 - 1.5 %    Immature Grans % 0.3 0.0 - 0.5 %    Neutrophils, Absolute 3.27 1.70 - 7.00 10*3/mm3    Lymphocytes, Absolute 2.40 0.70 - 3.10 10*3/mm3    Monocytes, Absolute 0.59 0.10 - 0.90 10*3/mm3    Eosinophils, Absolute 0.35 0.00 - 0.40 10*3/mm3    Basophils, Absolute 0.04 0.00 - 0.20 10*3/mm3    Immature Grans, Absolute 0.02 0.00 - 0.05 10*3/mm3    nRBC 0.0 0.0 - 0.2 /100 WBC   Comprehensive Metabolic Panel    Specimen: Blood   Result Value Ref Range    Glucose 121 (H) 65 - 99 mg/dL    BUN 15 6 - 20 mg/dL    Creatinine 0.96 0.57 - 1.00 mg/dL    Sodium 142 136 - 145 mmol/L    Potassium 4.5 3.5 - 5.2 mmol/L    Chloride 105 98 - 107 mmol/L    CO2 27.7 22.0 - 29.0 mmol/L    Calcium 9.0 8.6 - 10.5 mg/dL    Total Protein 7.1 6.0 - 8.5 g/dL    Albumin 3.90 3.50 - 5.20 g/dL    ALT (SGPT) 16 1 - 33 U/L    AST (SGOT) 18 1 - 32 U/L    Alkaline Phosphatase 112 39 - 117 U/L    Total Bilirubin 0.3 0.0 - 1.2 mg/dL    eGFR Non African Amer 61 >60 mL/min/1.73    Globulin 3.2  gm/dL    A/G Ratio 1.2 g/dL    BUN/Creatinine Ratio 15.6 7.0 - 25.0    Anion Gap 9.3 5.0 - 15.0 mmol/L   TSH Rfx On Abnormal To Free T4    Specimen: Blood   Result Value Ref Range    TSH 1.930 0.270 - 4.200 uIU/mL   Lipid Panel    Specimen: Blood   Result Value Ref Range    Total Cholesterol 163 0 - 200 mg/dL    Triglycerides 127 0 - 150 mg/dL    HDL Cholesterol 40 40 - 60 mg/dL    LDL Cholesterol  100 0 - 100 mg/dL    VLDL Cholesterol 23 5 - 40 mg/dL    LDL/HDL Ratio 2.44    Vitamin D 25 Hydroxy    Specimen: Blood   Result Value Ref Range    25 Hydroxy, Vitamin D 15.3 (L) 30.0 - 100.0 ng/ml   Hepatitis C Antibody    Specimen: Blood   Result Value Ref Range    Hepatitis C Ab Non-Reactive Non-Reactive       PE    Physical Exam  Vitals reviewed.   Constitutional:       General: She is not in acute distress.     Appearance: Normal appearance. She is well-developed. She is obese. She is not ill-appearing or diaphoretic.   HENT:      Head: Normocephalic and atraumatic.   Eyes:      Extraocular Movements: Extraocular movements intact.      Conjunctiva/sclera: Conjunctivae normal.   Pulmonary:      Effort: No respiratory distress.   Musculoskeletal:         General: Normal range of motion.      Cervical back: Normal range of motion.   Neurological:      General: No focal deficit present.      Mental Status: She is alert.   Psychiatric:         Attention and Perception: Attention and perception normal. She is attentive.         Mood and Affect: Mood and affect normal.         Speech: Speech normal.         Behavior: Behavior normal. Behavior is cooperative.         Thought Content: Thought content normal.         Cognition and Memory: Cognition and memory normal.         Judgment: Judgment normal.         A/P    Diagnoses and all orders for this visit:    1. Chronic pain syndrome  -     gabapentin (NEURONTIN) 400 MG capsule; Take 1 capsule by mouth 3 (Three) Times a Day.  Dispense: 90 capsule; Refill: 2  UDS, CSC and linden  up-to-date.  Taking gabapentin 400 mg TID.  This helps manage her chronic pain.  She has been on this for years.  No adverse effects.    2. Depression with anxiety  -     citalopram (CeleXA) 40 MG tablet; Take 1 tablet by mouth Daily for 90 days.  Dispense: 90 tablet; Refill: 1  -     buPROPion XL (WELLBUTRIN XL) 150 MG 24 hr tablet; Take 1 tablet by mouth Daily for 90 days.  Dispense: 90 tablet; Refill: 1  Mood is stable on current medications.    3. Insomnia, unspecified type  -     hydrOXYzine (ATARAX) 25 MG tablet; Take 1 tablet by mouth At Night As Needed for Anxiety.  Dispense: 90 tablet; Refill: 1    You have chosen to receive care through a telehealth visit.  Do you consent to use a video/audio connection for your medical care today? Yes.  7 minutes spent on video visit with patient.      Plan of care reviewed with patient at the conclusion of today's visit. Education was provided regarding diagnosis, management and any prescribed or recommended OTC medications.  Patient verbalizes understanding of and agreement with management plan.    Return in about 3 months (around 8/20/2021) for RechKEESHA ybarra.     Jennyfer Mauricio PA-C

## 2021-05-20 NOTE — TELEPHONE ENCOUNTER
LVM for patient to call back to schedule 3 month follow up appointment with Jennyfer Mauricio.     HUB may read, relay and schedule.

## 2021-05-21 ENCOUNTER — TELEPHONE (OUTPATIENT)
Dept: FAMILY MEDICINE CLINIC | Facility: CLINIC | Age: 51
End: 2021-05-21

## 2021-05-21 DIAGNOSIS — G89.4 CHRONIC PAIN SYNDROME: ICD-10-CM

## 2021-05-21 DIAGNOSIS — F41.8 DEPRESSION WITH ANXIETY: ICD-10-CM

## 2021-05-21 DIAGNOSIS — G47.00 INSOMNIA, UNSPECIFIED TYPE: ICD-10-CM

## 2021-05-21 RX ORDER — GABAPENTIN 400 MG/1
400 CAPSULE ORAL 3 TIMES DAILY
Qty: 90 CAPSULE | Refills: 2 | Status: CANCELLED | OUTPATIENT
Start: 2021-05-21

## 2021-05-21 RX ORDER — HYDROXYZINE HYDROCHLORIDE 25 MG/1
25 TABLET, FILM COATED ORAL NIGHTLY PRN
Qty: 90 TABLET | Refills: 1 | Status: CANCELLED | OUTPATIENT
Start: 2021-05-21

## 2021-05-21 RX ORDER — BUPROPION HYDROCHLORIDE 150 MG/1
150 TABLET ORAL DAILY
Qty: 90 TABLET | Refills: 1 | Status: CANCELLED | OUTPATIENT
Start: 2021-05-21 | End: 2021-08-19

## 2021-05-21 RX ORDER — HYDROXYZINE HYDROCHLORIDE 25 MG/1
25 TABLET, FILM COATED ORAL NIGHTLY PRN
Qty: 90 TABLET | Refills: 1 | Status: SHIPPED | OUTPATIENT
Start: 2021-05-21 | End: 2021-08-26 | Stop reason: SDUPTHER

## 2021-05-21 RX ORDER — GABAPENTIN 400 MG/1
400 CAPSULE ORAL 3 TIMES DAILY
Qty: 90 CAPSULE | Refills: 2 | Status: SHIPPED | OUTPATIENT
Start: 2021-05-21 | End: 2021-08-26 | Stop reason: SDUPTHER

## 2021-05-21 RX ORDER — CITALOPRAM 40 MG/1
40 TABLET ORAL DAILY
Qty: 90 TABLET | Refills: 1 | Status: SHIPPED | OUTPATIENT
Start: 2021-05-21 | End: 2021-08-26 | Stop reason: SDUPTHER

## 2021-05-21 RX ORDER — BUPROPION HYDROCHLORIDE 150 MG/1
150 TABLET ORAL DAILY
Qty: 90 TABLET | Refills: 1 | Status: SHIPPED | OUTPATIENT
Start: 2021-05-21 | End: 2021-08-26

## 2021-05-21 RX ORDER — CITALOPRAM 40 MG/1
40 TABLET ORAL DAILY
Qty: 90 TABLET | Refills: 1 | Status: CANCELLED | OUTPATIENT
Start: 2021-05-21 | End: 2021-08-19

## 2021-05-21 NOTE — TELEPHONE ENCOUNTER
Caller: Hyalee Hayes    Relationship: Self    Best call back number: [phone]    Who are you requesting to speak with (clinical staff, provider,  specific staff member): CLINICAL STAFF    What was the call regarding: PATIENT STATED SHE HAD MEDICATION CALLED IN FOR HER IN [place] ON 5/20/21 BUT SHE IS CURRENTLY IN [place] AND WOULD LIKE TO KNOW IF ALL OF HER MEDICATIONS COULD BE SENT TO A PHARMACY THERE INSTEAD    Do you require a callback:YES    [id number] - [place], [place] - [address] [phone] [facility] [phone] FX

## 2021-08-26 ENCOUNTER — OFFICE VISIT (OUTPATIENT)
Dept: FAMILY MEDICINE CLINIC | Facility: CLINIC | Age: 51
End: 2021-08-26

## 2021-08-26 VITALS
OXYGEN SATURATION: 95 % | BODY MASS INDEX: 44.39 KG/M2 | RESPIRATION RATE: 18 BRPM | HEIGHT: 65 IN | SYSTOLIC BLOOD PRESSURE: 126 MMHG | TEMPERATURE: 98.5 F | WEIGHT: 266.4 LBS | DIASTOLIC BLOOD PRESSURE: 82 MMHG | HEART RATE: 92 BPM

## 2021-08-26 DIAGNOSIS — G89.4 CHRONIC PAIN SYNDROME: Primary | ICD-10-CM

## 2021-08-26 DIAGNOSIS — F41.8 DEPRESSION WITH ANXIETY: ICD-10-CM

## 2021-08-26 DIAGNOSIS — M77.11 LATERAL EPICONDYLITIS OF RIGHT ELBOW: ICD-10-CM

## 2021-08-26 DIAGNOSIS — G47.00 INSOMNIA, UNSPECIFIED TYPE: ICD-10-CM

## 2021-08-26 DIAGNOSIS — R73.02 IMPAIRED GLUCOSE TOLERANCE: ICD-10-CM

## 2021-08-26 LAB — HBA1C MFR BLD: 5.6 %

## 2021-08-26 PROCEDURE — 99214 OFFICE O/P EST MOD 30 MIN: CPT | Performed by: PHYSICIAN ASSISTANT

## 2021-08-26 PROCEDURE — 3044F HG A1C LEVEL LT 7.0%: CPT | Performed by: PHYSICIAN ASSISTANT

## 2021-08-26 PROCEDURE — 83036 HEMOGLOBIN GLYCOSYLATED A1C: CPT | Performed by: PHYSICIAN ASSISTANT

## 2021-08-26 RX ORDER — GABAPENTIN 400 MG/1
400 CAPSULE ORAL 3 TIMES DAILY
Qty: 90 CAPSULE | Refills: 2 | Status: SHIPPED | OUTPATIENT
Start: 2021-08-26 | End: 2022-09-30

## 2021-08-26 RX ORDER — CITALOPRAM 40 MG/1
40 TABLET ORAL DAILY
Qty: 90 TABLET | Refills: 1 | Status: SHIPPED | OUTPATIENT
Start: 2021-08-26 | End: 2022-01-14 | Stop reason: SDUPTHER

## 2021-08-26 RX ORDER — BUPROPION HYDROCHLORIDE 300 MG/1
300 TABLET ORAL DAILY
COMMUNITY
End: 2021-08-26 | Stop reason: SDUPTHER

## 2021-08-26 RX ORDER — HYDROXYZINE HYDROCHLORIDE 25 MG/1
25 TABLET, FILM COATED ORAL NIGHTLY PRN
Qty: 90 TABLET | Refills: 1 | Status: SHIPPED | OUTPATIENT
Start: 2021-08-26 | End: 2022-01-14 | Stop reason: SDUPTHER

## 2021-08-26 RX ORDER — BUPROPION HYDROCHLORIDE 300 MG/1
300 TABLET ORAL DAILY
Qty: 90 TABLET | Refills: 1 | Status: SHIPPED | OUTPATIENT
Start: 2021-08-26 | End: 2022-01-14 | Stop reason: SDUPTHER

## 2021-08-26 RX ORDER — BUSPIRONE HYDROCHLORIDE 10 MG/1
10 TABLET ORAL DAILY
COMMUNITY
End: 2023-03-31

## 2021-08-26 NOTE — PROGRESS NOTES
Chief Complaint   Patient presents with   • medication refills      Gabapentin, Celexa, Atarax, Wellbutrin   • Depression     Celexa        HPI     Haylee Hayes is a pleasant 51 y.o. female who is here for routine follow-up of chronic pain, depression, anxiety and right lateral epicondylitis.  Patient is on gabapentin 400 mg three times a day for chronic pain.  This works well in managing her chronic pain.  She denies any adverse effects with medication.  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  She was seen online by a therapist and her wellbutrin was increased to 300 mg daily and buspirone 10 mg daily was added.  She is still taking hydroxyzine prn and celexa 40 mg daily.  She is doing well overall and reports improvement in mood.  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  She has ongoing right lateral epicondylitis for the last several weeks. She has been doing home physical therapy exercises and wearing a brace at work with no significant improvement.      Past Medical History:   Diagnosis Date   • Acute respiratory failure with hypoxia (CMS/HCC) 2018   • Chest pain 2018   • Chronic pain 2018   • Depression    • DVT (deep venous thrombosis) (CMS/HCC)     left arm, 2005 андрей   • Kidney stone     around early , has had some prior to that as well, passed all of them without surgery   • Obesity 2018   • Peptic ulceration    • Pneumonia     never had to be hospitalized for it       Past Surgical History:   Procedure Laterality Date   • APPENDECTOMY      age 15, they removed appendix while doing oopherectomy   •  SECTION     • CHOLECYSTECTOMY N/A 2018    Procedure: CHOLECYSTECTOMY LAPAROSCOPIC;  Surgeon: Edmund BEE MD;  Location: [facility] OR;  Service: General   • ENDOSCOPY N/A 6/3/2018    Procedure: ESOPHAGOGASTRODUODENOSCOPY;  Surgeon: Mark I Brunner, MD;  Location: [facility]  ENDOSCOPY;  Service: Gastroenterology   • GASTRECTOMY      [facility]   • RIGHT OOPHORECTOMY      torque   • TONSILLECTOMY      age 17       Family History   Problem Relation Age of Onset   • Atrial fibrillation Mother    • Heart failure Mother    • Diabetes Father    • Stroke Father    • No Known Problems Son    • Coronary artery disease Maternal Grandmother    • Valvular heart disease Maternal Grandmother    • Tuberculosis Maternal Grandfather    • Tuberculosis Paternal Grandfather    • Breast cancer Neg Hx    • Ovarian cancer Neg Hx    • Cancer Neg Hx        Social History     Socioeconomic History   • Marital status:      Spouse name: Not on file   • Number of children: Not on file   • Years of education: Not on file   • Highest education level: Not on file   Tobacco Use   • Smoking status: Former Smoker     Packs/day: 0.25     Types: Cigarettes     Quit date: 2/1/2018     Years since quitting: 3.5   • Smokeless tobacco: Never Used   • Tobacco comment: About a third a pack a day, quit 2/2018   Substance and Sexual Activity   • Alcohol use: Yes     Comment: about once a month 1-2 drinks at a dinner, no hx heavy use or abuse other than more social in college years   • Drug use: No   • Sexual activity: Defer       Allergies   Allergen Reactions   • Coumadin [Warfarin Sodium] Anaphylaxis     Swelling of throat. LUE DVT in 2005 when inpatient for unknown cause, did fine on heparin gtt, home on coumadin and anaphylaxis, completed and tolerated 6 months lovenox.   • Cymbalta [Duloxetine Hcl] Other (See Comments)     Worsening depression, CNS issues   • Paxil [Paroxetine Hcl] Other (See Comments)     Didn't tolerate   • Prozac [Fluoxetine Hcl] Other (See Comments)     Didn't tolerate       ROS  Review of Systems   Musculoskeletal: Positive for arthralgias and myalgias. Negative for joint swelling.   Neurological: Negative for weakness and numbness.   Psychiatric/Behavioral: Positive for sleep  "disturbance, depressed mood and stress. Negative for suicidal ideas. The patient is nervous/anxious.        Vitals:    08/26/21 1339   BP: 126/82   Pulse: 92   Resp: 18   Temp: 98.5 °F (36.9 °C)   TempSrc: Infrared   SpO2: 95%   Weight: 121 kg (266 lb 6.4 oz)   Height: 165.1 cm (65\")     Body mass index is 44.33 kg/m².    Current Outpatient Medications on File Prior to Visit   Medication Sig Dispense Refill   • busPIRone (BUSPAR) 10 MG tablet Take 10 mg by mouth Daily.     • Multiple Vitamins-Minerals (MULTIVITAMIN ADULT PO) Take 1 capsule by mouth Daily.     • [DISCONTINUED] buPROPion XL (Wellbutrin XL) 300 MG 24 hr tablet Take 300 mg by mouth Daily.     • [DISCONTINUED] citalopram (CeleXA) 40 MG tablet Take 1 tablet by mouth Daily for 90 days. 90 tablet 1   • [DISCONTINUED] gabapentin (NEURONTIN) 400 MG capsule Take 1 capsule by mouth 3 (Three) Times a Day. 90 capsule 2   • [DISCONTINUED] hydrOXYzine (ATARAX) 25 MG tablet Take 1 tablet by mouth At Night As Needed for Anxiety. 90 tablet 1   • [DISCONTINUED] albuterol sulfate HFA (PROAIR HFA) 108 (90 Base) MCG/ACT inhaler Inhale 2 puffs Every 4 (Four) Hours As Needed for Wheezing. 18 g 0   • [DISCONTINUED] buPROPion XL (WELLBUTRIN XL) 150 MG 24 hr tablet Take 1 tablet by mouth Daily for 90 days. 90 tablet 1     No current facility-administered medications on file prior to visit.       Results for orders placed or performed in visit on 08/26/21   POC Glycosylated Hemoglobin (Hb A1C)    Specimen: Blood   Result Value Ref Range    Hemoglobin A1C 5.6 %       PE    Physical Exam  Vitals reviewed.   Constitutional:       General: She is not in acute distress.     Appearance: Normal appearance. She is well-developed. She is morbidly obese. She is not ill-appearing or diaphoretic.   HENT:      Head: Normocephalic and atraumatic.   Eyes:      Extraocular Movements: Extraocular movements intact.      Conjunctiva/sclera: Conjunctivae normal.   Pulmonary:      Effort: No " respiratory distress.   Musculoskeletal:         General: Normal range of motion.        Arms:       Cervical back: Normal range of motion.   Neurological:      General: No focal deficit present.      Mental Status: She is alert.   Psychiatric:         Attention and Perception: Attention and perception normal. She is attentive.         Mood and Affect: Mood and affect normal.         Speech: Speech normal.         Behavior: Behavior normal. Behavior is cooperative.         Thought Content: Thought content normal.         Cognition and Memory: Cognition and memory normal.         Judgment: Judgment normal.         A/P    Diagnoses and all orders for this visit:    1. Chronic pain syndrome (Primary)  -     gabapentin (NEURONTIN) 400 MG capsule; Take 1 capsule by mouth 3 (Three) Times a Day.  Dispense: 90 capsule; Refill: 2  -     Compliance Drug Analysis, Ur - Urine, Clean Catch; Future  Stable with gabapentin 400 mg three times daily.  Has been on this medication for a long time with good benefit and no adverse side effects.  Update linden MARTÍNEZ today.  CSC is up-to-date.    2. Depression with anxiety  -     buPROPion XL (Wellbutrin XL) 300 MG 24 hr tablet; Take 1 tablet by mouth Daily.  Dispense: 90 tablet; Refill: 1  -     citalopram (CeleXA) 40 MG tablet; Take 1 tablet by mouth Daily for 90 days.  Dispense: 90 tablet; Refill: 1  Stable mood and anxiety with current medication dosages.    3. Insomnia, unspecified type  -     hydrOXYzine (ATARAX) 25 MG tablet; Take 1 tablet by mouth At Night As Needed for Anxiety.  Dispense: 90 tablet; Refill: 1    4. Lateral epicondylitis of right elbow  -     Ambulatory Referral to Physical Therapy Evaluate and treat  TTP along lateral epicondyle.  Patient has tried home physical therapy exercises without benefit.  Wearing brace when working.  Trial of brace daily for 2 weeks.  Limit lifting.  Will refer to PT.  Consider referral to ortho if pain persists.    5. Impaired glucose  tolerance  -     POC Glycosylated Hemoglobin (Hb A1C)  Hemoglobin AIC was normal.       Plan of care reviewed with patient at the conclusion of today's visit. Education was provided regarding diagnosis, management and any prescribed or recommended OTC medications.  Patient verbalizes understanding of and agreement with management plan.    Return in about 3 months (around 11/26/2021) for Recheck, CM.     Jennyfer Mauricio PA-C

## 2021-08-31 LAB — DRUGS UR: NORMAL

## 2021-11-10 ENCOUNTER — TELEPHONE (OUTPATIENT)
Dept: FAMILY MEDICINE CLINIC | Facility: CLINIC | Age: 51
End: 2021-11-10

## 2022-01-14 DIAGNOSIS — F41.8 DEPRESSION WITH ANXIETY: ICD-10-CM

## 2022-01-14 DIAGNOSIS — G89.4 CHRONIC PAIN SYNDROME: ICD-10-CM

## 2022-01-14 DIAGNOSIS — G47.00 INSOMNIA, UNSPECIFIED TYPE: ICD-10-CM

## 2022-01-14 NOTE — TELEPHONE ENCOUNTER
Rx Refill Note  Requested Prescriptions     Pending Prescriptions Disp Refills   • gabapentin (NEURONTIN) 400 MG capsule 90 capsule 2     Sig: Take 1 capsule by mouth 3 (Three) Times a Day.   • buPROPion XL (Wellbutrin XL) 300 MG 24 hr tablet 90 tablet 1     Sig: Take 1 tablet by mouth Daily.   • citalopram (CeleXA) 40 MG tablet 90 tablet 1     Sig: Take 1 tablet by mouth Daily for 90 days.   • hydrOXYzine (ATARAX) 25 MG tablet 90 tablet 1     Sig: Take 1 tablet by mouth At Night As Needed for Anxiety.      Last office visit with prescribing clinician: 8/26/2021      Next office visit with prescribing clinician: Visit date not found   Last CSA:2/5/2021  Last UDS:8/26/2021  Last Fill:8/26/2021           ANAIS MORALES MA  01/14/22, 16:25 EST'

## 2022-01-14 NOTE — TELEPHONE ENCOUNTER
Caller: Haylee Hayes AMEYA    Relationship: Self    Best call back number: [phone]    Requested Prescriptions:   Requested Prescriptions     Pending Prescriptions Disp Refills   • gabapentin (NEURONTIN) 400 MG capsule 90 capsule 2     Sig: Take 1 capsule by mouth 3 (Three) Times a Day.   • buPROPion XL (Wellbutrin XL) 300 MG 24 hr tablet 90 tablet 1     Sig: Take 1 tablet by mouth Daily.   • citalopram (CeleXA) 40 MG tablet 90 tablet 1     Sig: Take 1 tablet by mouth Daily for 90 days.   • hydrOXYzine (ATARAX) 25 MG tablet 90 tablet 1     Sig: Take 1 tablet by mouth At Night As Needed for Anxiety.        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

## 2022-01-17 RX ORDER — HYDROXYZINE HYDROCHLORIDE 25 MG/1
25 TABLET, FILM COATED ORAL NIGHTLY PRN
Qty: 90 TABLET | Refills: 0 | Status: SHIPPED | OUTPATIENT
Start: 2022-01-17 | End: 2022-11-09 | Stop reason: SDUPTHER

## 2022-01-17 RX ORDER — GABAPENTIN 400 MG/1
400 CAPSULE ORAL 3 TIMES DAILY
Qty: 90 CAPSULE | Refills: 2 | OUTPATIENT
Start: 2022-01-17

## 2022-01-17 RX ORDER — BUPROPION HYDROCHLORIDE 300 MG/1
300 TABLET ORAL DAILY
Qty: 90 TABLET | Refills: 0 | Status: SHIPPED | OUTPATIENT
Start: 2022-01-17 | End: 2022-09-30 | Stop reason: SDUPTHER

## 2022-01-17 RX ORDER — CITALOPRAM 40 MG/1
40 TABLET ORAL DAILY
Qty: 90 TABLET | Refills: 0 | Status: SHIPPED | OUTPATIENT
Start: 2022-01-17 | End: 2022-04-14

## 2022-01-17 NOTE — TELEPHONE ENCOUNTER
Patient needs appointment prior to refilling her controlled medicine - gabapentin.    All other medications were sent in for refill.

## 2022-01-17 NOTE — TELEPHONE ENCOUNTER
Attempted to contact patient, no answer. Left voicemail for patient to call the office back (office # given).     Hub may relay message and schedule appointment.    Jennyfer Mauricio PA-C 4 minutes ago (12:28 PM)        Patient needs appointment prior to refilling her controlled medicine - gabapentin.     All other medications were sent in for refill.

## 2022-04-14 ENCOUNTER — TELEPHONE (OUTPATIENT)
Dept: FAMILY MEDICINE CLINIC | Facility: CLINIC | Age: 52
End: 2022-04-14

## 2022-04-14 DIAGNOSIS — F41.8 DEPRESSION WITH ANXIETY: ICD-10-CM

## 2022-04-14 RX ORDER — CITALOPRAM 40 MG/1
TABLET ORAL
Qty: 90 TABLET | Refills: 0 | Status: SHIPPED | OUTPATIENT
Start: 2022-04-14 | End: 2022-09-30 | Stop reason: SDUPTHER

## 2022-04-14 NOTE — TELEPHONE ENCOUNTER
Rx Refill Note  Requested Prescriptions     Pending Prescriptions Disp Refills   • citalopram (CeleXA) 40 MG tablet [Pharmacy Med Name: CITALOPRAM 40MG TABLETS] 90 tablet 0     Sig: TAKE 1 TABLET BY MOUTH DAILY      Last office visit with prescribing clinician: 8/26/2021      Next office visit with prescribing clinician: 4/15/2022            Leonila Antoine MA  04/14/22, 08:58 EDT

## 2022-07-20 DIAGNOSIS — F41.8 DEPRESSION WITH ANXIETY: ICD-10-CM

## 2022-07-20 NOTE — TELEPHONE ENCOUNTER
Rx Refill Note  Requested Prescriptions     Pending Prescriptions Disp Refills   • buPROPion XL (WELLBUTRIN XL) 300 MG 24 hr tablet [Pharmacy Med Name: BUPROPION HCL  MG TABLET] 85 tablet      Sig: TAKE 1 TABLET BY MOUTH EVERY DAY      Last office visit with prescribing clinician: 8/26/2021      Next office visit with prescribing clinician: Visit date not found            Bree Salter MA  07/20/22, 08:41 EDT     Lft Vm. Patient was to Return in about 3 months (around 11/26/2021) for Recheck, CM.    HUBB MAY RELAY MESSAGE &  SCHEDULE PATIENT

## 2022-07-21 NOTE — TELEPHONE ENCOUNTER
Lft Vm. Patient was to Return in about 3 months (around 11/26/2021) for Recheck, CM. 2nd attempt      HUBB MAY RELAY MESSAGE &  SCHEDULE PATIENT

## 2022-07-22 RX ORDER — BUPROPION HYDROCHLORIDE 300 MG/1
TABLET ORAL
Qty: 85 TABLET | OUTPATIENT
Start: 2022-07-22

## 2022-07-22 NOTE — TELEPHONE ENCOUNTER
Attempted to call pt, received no answer. Left  with office number given.    3RD ATTEMPT    Okay for hub to relay message.     Patient was to Return in about 3 months (around 11/26/2021) for Recheck, CM. 2nd attempt

## 2022-07-25 DIAGNOSIS — F41.8 DEPRESSION WITH ANXIETY: ICD-10-CM

## 2022-07-25 RX ORDER — CITALOPRAM 40 MG/1
TABLET ORAL
Qty: 90 TABLET | Refills: 0 | OUTPATIENT
Start: 2022-07-25

## 2022-07-25 NOTE — TELEPHONE ENCOUNTER
Rx Refill Note  Requested Prescriptions     Pending Prescriptions Disp Refills   • citalopram (CeleXA) 40 MG tablet [Pharmacy Med Name: CITALOPRAM HBR 40 MG TABLET] 90 tablet 0     Sig: TAKE 1 TABLET BY MOUTH EVERY DAY      Last office visit with prescribing clinician: 8/26/2021      Next office visit with prescribing clinician: Visit date not found            Bree Salter MA  07/25/22, 12:52 EDT     Patient notified. Patient states that she needs to check her work schedule & insurance (changed insurance) and call us back

## 2022-09-30 DIAGNOSIS — F41.8 DEPRESSION WITH ANXIETY: ICD-10-CM

## 2022-09-30 RX ORDER — BUPROPION HYDROCHLORIDE 300 MG/1
300 TABLET ORAL DAILY
Qty: 90 TABLET | Refills: 0 | Status: SHIPPED | OUTPATIENT
Start: 2022-09-30 | End: 2022-12-07 | Stop reason: SDUPTHER

## 2022-09-30 RX ORDER — CITALOPRAM 40 MG/1
40 TABLET ORAL DAILY
Qty: 90 TABLET | Refills: 0 | Status: SHIPPED | OUTPATIENT
Start: 2022-09-30 | End: 2023-01-03

## 2022-10-03 NOTE — TELEPHONE ENCOUNTER
Called and spoke with spoke with patient she said she was going to call and reschedule had to cancel Friday due to being sick and thinking she had Covid doesn't have her calendar with her at work so wasn't able to schedule appointment

## 2022-11-09 ENCOUNTER — OFFICE VISIT (OUTPATIENT)
Dept: FAMILY MEDICINE CLINIC | Facility: CLINIC | Age: 52
End: 2022-11-09

## 2022-11-09 VITALS
OXYGEN SATURATION: 97 % | BODY MASS INDEX: 44.98 KG/M2 | WEIGHT: 270 LBS | TEMPERATURE: 97.7 F | SYSTOLIC BLOOD PRESSURE: 110 MMHG | HEART RATE: 90 BPM | DIASTOLIC BLOOD PRESSURE: 84 MMHG | HEIGHT: 65 IN

## 2022-11-09 DIAGNOSIS — F50.81 BINGE EATING DISORDER: Primary | ICD-10-CM

## 2022-11-09 DIAGNOSIS — F98.8 ATTENTION DEFICIT DISORDER (ADD) WITHOUT HYPERACTIVITY: ICD-10-CM

## 2022-11-09 DIAGNOSIS — E66.01 MORBID OBESITY: Chronic | ICD-10-CM

## 2022-11-09 DIAGNOSIS — G25.81 RLS (RESTLESS LEGS SYNDROME): ICD-10-CM

## 2022-11-09 DIAGNOSIS — Z51.81 ENCOUNTER FOR THERAPEUTIC DRUG LEVEL MONITORING: ICD-10-CM

## 2022-11-09 DIAGNOSIS — F50.81 BINGE EATING DISORDER: ICD-10-CM

## 2022-11-09 DIAGNOSIS — G44.52 NEW DAILY PERSISTENT HEADACHE: ICD-10-CM

## 2022-11-09 DIAGNOSIS — R42 DIZZINESS: Primary | ICD-10-CM

## 2022-11-09 DIAGNOSIS — Z98.84 H/O GASTRIC BYPASS: ICD-10-CM

## 2022-11-09 DIAGNOSIS — R06.83 SNORING: ICD-10-CM

## 2022-11-09 DIAGNOSIS — E55.9 VITAMIN D DEFICIENCY: ICD-10-CM

## 2022-11-09 DIAGNOSIS — Z13.220 SCREENING FOR CHOLESTEROL LEVEL: ICD-10-CM

## 2022-11-09 DIAGNOSIS — F51.01 PRIMARY INSOMNIA: ICD-10-CM

## 2022-11-09 DIAGNOSIS — F41.8 DEPRESSION WITH ANXIETY: ICD-10-CM

## 2022-11-09 DIAGNOSIS — R73.02 IMPAIRED GLUCOSE TOLERANCE: ICD-10-CM

## 2022-11-09 DIAGNOSIS — Z13.29 SCREENING FOR THYROID DISORDER: ICD-10-CM

## 2022-11-09 PROCEDURE — 99214 OFFICE O/P EST MOD 30 MIN: CPT | Performed by: PHYSICIAN ASSISTANT

## 2022-11-09 RX ORDER — ROPINIROLE 0.25 MG/1
TABLET, FILM COATED ORAL
COMMUNITY
Start: 2022-10-26 | End: 2023-02-02 | Stop reason: SDUPTHER

## 2022-11-09 RX ORDER — BENZONATATE 100 MG/1
1 CAPSULE ORAL 3 TIMES DAILY PRN
COMMUNITY
Start: 2022-03-19 | End: 2023-03-19

## 2022-11-09 RX ORDER — HYDROXYZINE HYDROCHLORIDE 25 MG/1
25 TABLET, FILM COATED ORAL NIGHTLY PRN
Qty: 90 TABLET | Refills: 0 | Status: SHIPPED | OUTPATIENT
Start: 2022-11-09 | End: 2023-02-03

## 2022-11-09 NOTE — PROGRESS NOTES
Chief Complaint   Patient presents with   • Headache   • Dizziness         Haylee Hayes is a 52 y.o. female who presents for Headache and Dizziness.  Patient has accepted a job at [employer] and is no longer traveling for work.  She established with a gynecologist.  Has follow-up in February for pap smear.  She has been referred for mammogram and needs to schedule this.    She reports restless legs which her gynecologist prescribed Requip for.  She is unsure if this is helping at this time.  She has not been on gabapentin for a while.    She reports feeling forgetful and has difficulty with focus and attention on some days.  Other days she does not struggle with this.  She does snore and has morning headaches.  She has been tested in the past for sleep apnea and only mild CECILLE has been discovered.  She has worn a CPAP previously but not currently.  The last time she was seen by sleep medicine was in 2018.  She has been on Ambien in the past but prefers to avoid this if possible.  She has difficulty falling and staying asleep at night.    She has history of depression which she feels currently is well controlled with her Wellbutrin and Celexa.    Previous vitamin D was 15.  She is not currently taking vitamin D supplements regularly.    She has history of gastric sleeve and therefore has vitamin deficiencies due to malabsorption.  She admits to binge eating.      Past Medical History:   Diagnosis Date   • Acute respiratory failure with hypoxia ([facility]) 6/2/2018   • Chest pain 6/1/2018   • Chronic pain 6/1/2018   • Depression    • DVT (deep venous thrombosis) ([facility])     left arm, 2005 андрей   • Kidney stone     around early 2000s, has had some prior to that as well, passed all of them without surgery   • Obesity 6/1/2018   • Peptic ulceration    • Pneumonia     never had to be hospitalized for it       Past Surgical History:   Procedure Laterality Date   • APPENDECTOMY      age 15, they removed appendix while  doing oopherectomy   •  SECTION     • CHOLECYSTECTOMY N/A 2018    Procedure: CHOLECYSTECTOMY LAPAROSCOPIC;  Surgeon: Edmund BEE MD;  Location:  [facility] OR;  Service: General   • ENDOSCOPY N/A 6/3/2018    Procedure: ESOPHAGOGASTRODUODENOSCOPY;  Surgeon: Mark I Brunner, MD;  Location:  [facility] ENDOSCOPY;  Service: Gastroenterology   • GASTRECTOMY      [facility]   • RIGHT OOPHORECTOMY      torque   • TONSILLECTOMY      age 17       Family History   Problem Relation Age of Onset   • Atrial fibrillation Mother    • Heart failure Mother    • Diabetes Father    • Stroke Father    • No Known Problems Son    • Coronary artery disease Maternal Grandmother    • Valvular heart disease Maternal Grandmother    • Tuberculosis Maternal Grandfather    • Tuberculosis Paternal Grandfather    • Breast cancer Neg Hx    • Ovarian cancer Neg Hx    • Cancer Neg Hx        Social History     Socioeconomic History   • Marital status:    Tobacco Use   • Smoking status: Former     Packs/day: 0.25     Years: 0.50     Pack years: 0.13     Types: Cigarettes     Start date: 2017     Quit date: 2018     Years since quittin.7   • Smokeless tobacco: Never   • Tobacco comments:     About a third a pack a day, quit 2018   Vaping Use   • Vaping Use: Never used   Substance and Sexual Activity   • Alcohol use: Yes     Comment: about once a month 1-2 drinks at a dinner, no hx heavy use or abuse other than more social in college years   • Drug use: No   • Sexual activity: Defer       Allergies   Allergen Reactions   • Coumadin [Warfarin Sodium] Anaphylaxis     Swelling of throat. LUE DVT in  when inpatient for unknown cause, did fine on heparin gtt, home on coumadin and anaphylaxis, completed and tolerated 6 months lovenox.   • Cymbalta [Duloxetine Hcl] Other (See Comments)     Worsening depression, CNS issues   • Paxil [Paroxetine Hcl] Other (See Comments)     Didn't tolerate   • Prozac [Fluoxetine Hcl]  "Other (See Comments)     Didn't tolerate       ROS    Review of Systems   Constitutional: Positive for fatigue. Negative for chills, diaphoresis and fever.   HENT: Negative for congestion, postnasal drip and rhinorrhea.    Respiratory: Negative for cough, shortness of breath and wheezing.    Cardiovascular: Negative for chest pain and leg swelling.   Neurological: Positive for headache. Negative for dizziness.   Psychiatric/Behavioral: Positive for sleep disturbance, depressed mood and stress. Negative for agitation, self-injury and suicidal ideas. The patient is not nervous/anxious.        Vitals:    11/09/22 1025   BP: 110/84   BP Location: Left arm   Patient Position: Sitting   Cuff Size: Large Adult   Pulse: 90   Temp: 97.7 °F (36.5 °C)   TempSrc: Temporal   SpO2: 97%   Weight: 122 kg (270 lb)   Height: 165.1 cm (65\")   PainSc:   4   PainLoc: Head     Body mass index is 44.93 kg/m².    Current Outpatient Medications on File Prior to Visit   Medication Sig Dispense Refill   • benzonatate (TESSALON) 100 MG capsule Take 1 capsule by mouth 3 (Three) Times a Day As Needed.     • buPROPion XL (Wellbutrin XL) 300 MG 24 hr tablet Take 1 tablet by mouth Daily. 90 tablet 0   • busPIRone (BUSPAR) 10 MG tablet Take 10 mg by mouth Daily.     • citalopram (CeleXA) 40 MG tablet Take 1 tablet by mouth Daily. 90 tablet 0   • Multiple Vitamins-Minerals (MULTIVITAMIN ADULT PO) Take 1 capsule by mouth Daily.     • rOPINIRole (REQUIP) 0.25 MG tablet TAKE 1 TABLET BY MOUTH EVERY EVENING 1 TO 3 HOURS BEFORE BEDTIME     • [DISCONTINUED] hydrOXYzine (ATARAX) 25 MG tablet Take 1 tablet by mouth At Night As Needed for Anxiety. 90 tablet 0     No current facility-administered medications on file prior to visit.       Results for orders placed or performed in visit on 08/26/21   Compliance Drug Analysis, Ur -   Result Value Ref Range    Report Summary FINAL        PE    Physical Exam  Vitals reviewed.   Constitutional:       General: She is " not in acute distress.     Appearance: Normal appearance. She is well-developed. She is morbidly obese. She is not ill-appearing or diaphoretic.   HENT:      Head: Normocephalic and atraumatic.   Eyes:      Extraocular Movements: Extraocular movements intact.      Conjunctiva/sclera: Conjunctivae normal.   Cardiovascular:      Rate and Rhythm: Normal rate and regular rhythm.      Heart sounds: Normal heart sounds.   Pulmonary:      Effort: Pulmonary effort is normal.      Breath sounds: Normal breath sounds.   Musculoskeletal:         General: Normal range of motion.      Cervical back: Normal range of motion.      Right lower leg: No edema.      Left lower leg: No edema.   Skin:     General: Skin is warm.      Findings: No erythema or rash.   Neurological:      General: No focal deficit present.      Mental Status: She is alert.   Psychiatric:         Attention and Perception: Attention and perception normal. She is attentive.         Mood and Affect: Mood and affect normal.         Speech: Speech normal.         Behavior: Behavior normal. Behavior is cooperative.         Thought Content: Thought content normal.         Cognition and Memory: Cognition and memory normal.         Judgment: Judgment normal.          A/P    Diagnoses and all orders for this visit:    1. Dizziness (Primary)  Episodic.  Improved with meclizine.    2. New daily persistent headache  -     Comprehensive metabolic panel; Future  Generally in the morning and may be related to sleep deprivation and sleep apnea.    3. Impaired glucose tolerance  -     Hemoglobin A1c; Future    4. RLS (restless legs syndrome)  -     Vitamin B12 & Folate; Future  -     Iron Profile; Future  -     CBC (No Diff); Future  -     Ambulatory Referral to Sleep Medicine  Requip 0.25 mg.  Will increase to 2 tabs nightly if symptoms persist.    5. Snoring  -     Ambulatory Referral to Sleep Medicine    6. Primary insomnia  -     Ambulatory Referral to Sleep Medicine  -      hydrOXYzine (ATARAX) 25 MG tablet; Take 1 tablet by mouth At Night As Needed for Anxiety.  Dispense: 90 tablet; Refill: 0    7. Depression with anxiety  Stable with Celexa and Wellbutrin.    8. Attention deficit disorder (ADD) without hyperactivity  9. Binge eating disorder  Trial of Vyvanse 30 mg daily.  May need to increase to 60 mg daily.  UDS, CSC and linden obtained today.    10. Morbid obesity (HCC)  -     Ambulatory Referral to Sleep Medicine    11. H/O gastric bypass    12. Vitamin D deficiency  -     cholecalciferol (VITAMIN D3) 1.25 MG (56791 UT) capsule; Take 1 capsule by mouth 1 (One) Time Per Week.  Dispense: 12 capsule; Refill: 3    13. Screening for cholesterol level  -     Lipid Panel; Future    14. Screening for thyroid disorder  -     TSH Rfx On Abnormal To Free T4; Future    15. Encounter for therapeutic drug level monitoring  -     Compliance Drug Analysis, Ur - Urine, Clean Catch; Future         Plan of care reviewed with patient at the conclusion of today's visit. Education was provided regarding diagnosis, management and any prescribed or recommended OTC medications.  Patient verbalizes understanding of and agreement with management plan.    Dictated Utilizing Dragon Dictation     Please note that portions of this note were completed with a voice recognition program.     Part of this note may be an electronic transcription/translation of spoken language to printed text using the Dragon Dictation System.    Return in about 3 months (around 2/9/2023) for Annual physical.     Jennyfer Mauricio PA-C

## 2022-11-17 LAB — DRUGS UR: NORMAL

## 2022-12-07 DIAGNOSIS — F41.8 DEPRESSION WITH ANXIETY: ICD-10-CM

## 2022-12-07 RX ORDER — BUPROPION HYDROCHLORIDE 300 MG/1
300 TABLET ORAL DAILY
Qty: 90 TABLET | Refills: 0 | Status: SHIPPED | OUTPATIENT
Start: 2022-12-07 | End: 2023-03-30 | Stop reason: SDUPTHER

## 2022-12-07 NOTE — TELEPHONE ENCOUNTER
Rx Refill Note  Requested Prescriptions     Signed Prescriptions Disp Refills   • buPROPion XL (Wellbutrin XL) 300 MG 24 hr tablet 90 tablet 0     Sig: Take 1 tablet by mouth Daily.     Authorizing Provider: SHARYN KRISHNAN     Ordering User: KRISTAN GUTIERREZ      Last office visit with prescribing clinician: 11/9/2022   Last telemedicine visit with prescribing clinician: 2/9/2023   Next office visit with prescribing clinician: 2/9/2023                         Would you like a call back once the refill request has been completed: [] Yes [] No    If the office needs to give you a call back, can they leave a voicemail: [] Yes [] No    Kristan Gutierrez MA  12/07/22, 10:56 EST

## 2022-12-12 ENCOUNTER — TELEPHONE (OUTPATIENT)
Dept: FAMILY MEDICINE CLINIC | Facility: CLINIC | Age: 52
End: 2022-12-12

## 2022-12-12 DIAGNOSIS — F98.8 ATTENTION DEFICIT DISORDER (ADD) WITHOUT HYPERACTIVITY: ICD-10-CM

## 2022-12-12 DIAGNOSIS — F50.81 BINGE EATING DISORDER: ICD-10-CM

## 2022-12-12 NOTE — TELEPHONE ENCOUNTER
Contacted patient to notify. Verbalized understanding     Dose increase sent to the pharmacy.  Hub can relay and document.

## 2022-12-12 NOTE — TELEPHONE ENCOUNTER
Caller: Haylee Hayes    Relationship: Self    Best call back number: [phone]    What is the best time to reach you: ANYTIME     Who are you requesting to speak with (clinical staff, provider,  specific staff member): CLINICAL STAFF     What was the call regarding: PATIENT IS CALLING TO SEE ABOUT GETTING THE VYVANSE CHANGED. SHE SAYS THAT AT [facility] SHE FELT MORE CLEAR AND THEN NOT. SHE THINGS THAT THE DOSE MAY NEED TO BE STRONGER.     Do you require a callback: YES

## 2022-12-12 NOTE — TELEPHONE ENCOUNTER
Attempted to contact pt, no answer. LVM for return call.    Hub may relay message & document.    Vyvanse dose has been increased to 40mg every morning & new script has been sent to the pharmacy.

## 2023-01-01 DIAGNOSIS — F41.8 DEPRESSION WITH ANXIETY: ICD-10-CM

## 2023-01-03 RX ORDER — CITALOPRAM 40 MG/1
TABLET ORAL
Qty: 90 TABLET | Refills: 0 | Status: SHIPPED | OUTPATIENT
Start: 2023-01-03 | End: 2023-03-30 | Stop reason: SDUPTHER

## 2023-01-03 NOTE — TELEPHONE ENCOUNTER
Rx Refill Note  Requested Prescriptions     Pending Prescriptions Disp Refills   • citalopram (CeleXA) 40 MG tablet [Pharmacy Med Name: CITALOPRAM HBR 40 MG TABLET] 90 tablet 0     Sig: TAKE 1 TABLET BY MOUTH EVERY DAY      Last office visit with prescribing clinician: 11/9/2022   Next office visit with prescribing clinician: 2/9/2023 Lou Che MA  01/03/23, 12:22 EST

## 2023-02-02 DIAGNOSIS — F50.81 BINGE EATING DISORDER: ICD-10-CM

## 2023-02-02 DIAGNOSIS — G25.81 RLS (RESTLESS LEGS SYNDROME): Primary | ICD-10-CM

## 2023-02-02 DIAGNOSIS — F98.8 ATTENTION DEFICIT DISORDER (ADD) WITHOUT HYPERACTIVITY: ICD-10-CM

## 2023-02-02 RX ORDER — ROPINIROLE 0.25 MG/1
0.25 TABLET, FILM COATED ORAL NIGHTLY
Qty: 90 TABLET | Refills: 1 | Status: SHIPPED | OUTPATIENT
Start: 2023-02-02 | End: 2023-03-24 | Stop reason: SDUPTHER

## 2023-02-02 RX ORDER — ROPINIROLE 0.25 MG/1
TABLET, FILM COATED ORAL
Status: CANCELLED | OUTPATIENT
Start: 2023-02-02

## 2023-02-02 NOTE — TELEPHONE ENCOUNTER
Rx Refill Note  Requested Prescriptions     Pending Prescriptions Disp Refills   • lisdexamfetamine (Vyvanse) 40 MG capsule 30 capsule 0     Sig: Take 1 capsule by mouth Every Morning   • rOPINIRole (REQUIP) 0.25 MG tablet       Sig: Take 1 hour before bedtime.      Last office visit with prescribing clinician: 11/9/2022   Last telemedicine visit with prescribing clinician: 2/9/2023   Next office visit with prescribing clinician: 2/9/2023                         Would you like a call back once the refill request has been completed: [] Yes [] No    If the office needs to give you a call back, can they leave a voicemail: [] Yes [] No    Adina Orr MA  02/02/23, 13:15 EST     CSA 11/9/22  UDS 11/9/22

## 2023-02-02 NOTE — TELEPHONE ENCOUNTER
Caller: Haylee Hyaes    Relationship: Self    Best call back number: [phone]    Requested Prescriptions:   Requested Prescriptions     Pending Prescriptions Disp Refills   • lisdexamfetamine (Vyvanse) 40 MG capsule 30 capsule 0     Sig: Take 1 capsule by mouth Every Morning   • rOPINIRole (REQUIP) 0.25 MG tablet       Sig: Take 1 hour before bedtime.        Pharmacy where request should be sent: [facility]/PH[facility]ACY #[id number] - [place], [place] - [address]  - [phone]  - [phone] FX     Additional details provided by patient: PATIENT IS OUT     Does the patient have less than a 3 day supply:  [x] Yes  [] No    Would you like a call back once the refill request has been completed: [] Yes [x] No    If the office needs to give you a call back, can they leave a voicemail: [x] Yes [] No    Cadance Dunaway, RegSched Rep   02/02/23 11:13 EST

## 2023-02-03 DIAGNOSIS — F51.01 PRIMARY INSOMNIA: ICD-10-CM

## 2023-02-03 RX ORDER — HYDROXYZINE HYDROCHLORIDE 25 MG/1
25 TABLET, FILM COATED ORAL NIGHTLY PRN
Qty: 90 TABLET | Refills: 1 | Status: SHIPPED | OUTPATIENT
Start: 2023-02-03

## 2023-03-23 DIAGNOSIS — G25.81 RLS (RESTLESS LEGS SYNDROME): ICD-10-CM

## 2023-03-23 DIAGNOSIS — F98.8 ATTENTION DEFICIT DISORDER (ADD) WITHOUT HYPERACTIVITY: ICD-10-CM

## 2023-03-23 DIAGNOSIS — F50.81 BINGE EATING DISORDER: ICD-10-CM

## 2023-03-23 RX ORDER — ROPINIROLE 0.25 MG/1
0.25 TABLET, FILM COATED ORAL NIGHTLY
Qty: 90 TABLET | Refills: 1 | Status: CANCELLED | OUTPATIENT
Start: 2023-03-23

## 2023-03-23 NOTE — TELEPHONE ENCOUNTER
Caller: Abigail Haylee AMEYA    Relationship: Self    Best call back number: [phone]    Requested Prescriptions:   Requested Prescriptions     Pending Prescriptions Disp Refills   • rOPINIRole (REQUIP) 0.25 MG tablet 90 tablet 1     Sig: Take 1 tablet by mouth Every Night. Take 1 hour before bedtime.   • lisdexamfetamine (Vyvanse) 40 MG capsule 30 capsule 0     Sig: Take 1 capsule by mouth Every Morning        Pharmacy where request should be sent: [address] - [phone]  - [phone]      Last office visit with prescribing clinician: 11/9/2022   Last telemedicine visit with prescribing clinician: 3/31/2023   Next office visit with prescribing clinician: 3/31/2023     Additional details provided by patient: PATIENT HAS 2 DAYS LEFT AND SHE WOULD LIKE TO KNOW IF SHE CAN UP THE DOSAGE TO 1MG ON THE ROPINIROLE    Does the patient have less than a 3 day supply:  [x] Yes  [] No    Would you like a call back once the refill request has been completed: [] Yes [x] No    If the office needs to give you a call back, can they leave a voicemail: [x] Yes [] No    Cadance Dunaway, RegSched Rep   03/23/23 15:10 EDT

## 2023-03-24 RX ORDER — ROPINIROLE 0.5 MG/1
TABLET, FILM COATED ORAL
Qty: 55 TABLET | Refills: 0 | Status: SHIPPED | OUTPATIENT
Start: 2023-03-24 | End: 2023-03-31 | Stop reason: SDUPTHER

## 2023-03-28 ENCOUNTER — TELEPHONE (OUTPATIENT)
Dept: FAMILY MEDICINE CLINIC | Facility: CLINIC | Age: 53
End: 2023-03-28
Payer: COMMERCIAL

## 2023-03-28 DIAGNOSIS — F98.8 ATTENTION DEFICIT DISORDER (ADD) WITHOUT HYPERACTIVITY: ICD-10-CM

## 2023-03-28 DIAGNOSIS — F50.81 BINGE EATING DISORDER: ICD-10-CM

## 2023-03-28 NOTE — TELEPHONE ENCOUNTER
Gunnar from [facility] called, and said that he received the prescription of Requip for the patient.  But patient said that she also needs the prescription for Vyvanse 40mg (30 day supply), one  capsule daily sent over as well.

## 2023-03-28 NOTE — TELEPHONE ENCOUNTER
Last fill: 2/3/23  Last office visit: 11/9/22  Next office visit: 3/331/23  CSA up-to-date? yes  Date of last UDS: 11/9/22  UDS consistent: consistent

## 2023-03-28 NOTE — TELEPHONE ENCOUNTER
Vyvanse refilled declined.  She will need to keep her appointment on 331 for refill because its over 3 months.

## 2023-03-31 ENCOUNTER — LAB (OUTPATIENT)
Dept: LAB | Facility: HOSPITAL | Age: 53
End: 2023-03-31
Payer: COMMERCIAL

## 2023-03-31 ENCOUNTER — OFFICE VISIT (OUTPATIENT)
Dept: FAMILY MEDICINE CLINIC | Facility: CLINIC | Age: 53
End: 2023-03-31
Payer: COMMERCIAL

## 2023-03-31 VITALS
HEART RATE: 77 BPM | WEIGHT: 234 LBS | OXYGEN SATURATION: 94 % | DIASTOLIC BLOOD PRESSURE: 84 MMHG | SYSTOLIC BLOOD PRESSURE: 122 MMHG | HEIGHT: 65 IN | BODY MASS INDEX: 38.99 KG/M2 | TEMPERATURE: 96.9 F

## 2023-03-31 DIAGNOSIS — F50.81 BINGE EATING DISORDER: ICD-10-CM

## 2023-03-31 DIAGNOSIS — F98.8 ATTENTION DEFICIT DISORDER (ADD) WITHOUT HYPERACTIVITY: Primary | ICD-10-CM

## 2023-03-31 DIAGNOSIS — G25.81 RLS (RESTLESS LEGS SYNDROME): ICD-10-CM

## 2023-03-31 DIAGNOSIS — J02.9 SORE THROAT: ICD-10-CM

## 2023-03-31 DIAGNOSIS — G44.52 NEW DAILY PERSISTENT HEADACHE: ICD-10-CM

## 2023-03-31 DIAGNOSIS — Z20.828 EXPOSURE TO THE FLU: ICD-10-CM

## 2023-03-31 DIAGNOSIS — R73.02 IMPAIRED GLUCOSE TOLERANCE: ICD-10-CM

## 2023-03-31 DIAGNOSIS — F41.8 DEPRESSION WITH ANXIETY: ICD-10-CM

## 2023-03-31 DIAGNOSIS — Z13.29 SCREENING FOR THYROID DISORDER: ICD-10-CM

## 2023-03-31 DIAGNOSIS — Z13.220 SCREENING FOR CHOLESTEROL LEVEL: ICD-10-CM

## 2023-03-31 DIAGNOSIS — E66.01 MORBID OBESITY: Chronic | ICD-10-CM

## 2023-03-31 LAB
DEPRECATED RDW RBC AUTO: 42 FL (ref 37–54)
ERYTHROCYTE [DISTWIDTH] IN BLOOD BY AUTOMATED COUNT: 13.4 % (ref 12.3–15.4)
EXPIRATION DATE: NORMAL
EXPIRATION DATE: NORMAL
FLUAV AG UPPER RESP QL IA.RAPID: NOT DETECTED
FLUBV AG UPPER RESP QL IA.RAPID: NOT DETECTED
HBA1C MFR BLD: 5.7 % (ref 4.8–5.6)
HCT VFR BLD AUTO: 46.4 % (ref 34–46.6)
HGB BLD-MCNC: 16.1 G/DL (ref 12–15.9)
INTERNAL CONTROL: NORMAL
INTERNAL CONTROL: NORMAL
Lab: NORMAL
Lab: NORMAL
MCH RBC QN AUTO: 29.9 PG (ref 26.6–33)
MCHC RBC AUTO-ENTMCNC: 34.7 G/DL (ref 31.5–35.7)
MCV RBC AUTO: 86.1 FL (ref 79–97)
PLATELET # BLD AUTO: 251 10*3/MM3 (ref 140–450)
PMV BLD AUTO: 10.6 FL (ref 6–12)
RBC # BLD AUTO: 5.39 10*6/MM3 (ref 3.77–5.28)
S PYO AG THROAT QL: NEGATIVE
SARS-COV-2 AG UPPER RESP QL IA.RAPID: NOT DETECTED
WBC NRBC COR # BLD: 7.38 10*3/MM3 (ref 3.4–10.8)

## 2023-03-31 PROCEDURE — 82746 ASSAY OF FOLIC ACID SERUM: CPT

## 2023-03-31 PROCEDURE — 83036 HEMOGLOBIN GLYCOSYLATED A1C: CPT

## 2023-03-31 PROCEDURE — 84466 ASSAY OF TRANSFERRIN: CPT

## 2023-03-31 PROCEDURE — 80061 LIPID PANEL: CPT

## 2023-03-31 PROCEDURE — 82607 VITAMIN B-12: CPT

## 2023-03-31 PROCEDURE — 83540 ASSAY OF IRON: CPT

## 2023-03-31 PROCEDURE — 36415 COLL VENOUS BLD VENIPUNCTURE: CPT

## 2023-03-31 PROCEDURE — 80050 GENERAL HEALTH PANEL: CPT

## 2023-03-31 RX ORDER — ROPINIROLE 1 MG/1
1 TABLET, FILM COATED ORAL NIGHTLY
Qty: 90 TABLET | Refills: 1 | Status: SHIPPED | OUTPATIENT
Start: 2023-03-31

## 2023-03-31 RX ORDER — BUPROPION HYDROCHLORIDE 300 MG/1
300 TABLET ORAL DAILY
Qty: 90 TABLET | Refills: 3 | Status: SHIPPED | OUTPATIENT
Start: 2023-03-31

## 2023-03-31 RX ORDER — SEMAGLUTIDE 2.68 MG/ML
INJECTION, SOLUTION SUBCUTANEOUS
COMMUNITY
Start: 2023-03-08

## 2023-03-31 RX ORDER — CITALOPRAM 40 MG/1
40 TABLET ORAL DAILY
Qty: 90 TABLET | Refills: 3 | Status: SHIPPED | OUTPATIENT
Start: 2023-03-31

## 2023-03-31 NOTE — PROGRESS NOTES
Chief Complaint   Patient presents with   • Depression   • Anxiety   • Sore Throat   • Cough     Sister is flu A +         Haylee Hayes is a 52 y.o. female who presents for Depression, Anxiety, Sore Throat, and Cough (Sister is flu A +).  Patient was with her sister this weekend who tested positive for Flu A.  Patient reports sore throat, dry cough and fatigue.  No fever, chills or body aches.    Reports stable mood with Celexa and Wellbutrin.  Taking Vyvanse 40 mg daily for ADD which is working well.  The cost of the Vyvanse is expensive, $300+/month, and she would like to try something else if possible.      Taking ropinirole 1 mg nightly and this is working well for her restless legs.    Patient will see gynecologist next week who will order mammogram and bone scan.  She is established with Dr. Katz at [facility].  She is on Ozempic and this is managed by Dr. Katz.    Past Medical History:   Diagnosis Date   • Acute respiratory failure with hypoxia (HCC) 2018   • Allergic Coumadin   • Chest pain 2018   • Chronic pain 2018   • Depression    • DVT (deep venous thrombosis) (HCC)     left arm,  андрей   • Kidney stone     around early , has had some prior to that as well, passed all of them without surgery   • Obesity 2018   • Peptic ulceration    • Pneumonia     never had to be hospitalized for it       Past Surgical History:   Procedure Laterality Date   • APPENDECTOMY      age 15, they removed appendix while doing oopherectomy   •  SECTION     • CHOLECYSTECTOMY N/A 2018    Procedure: CHOLECYSTECTOMY LAPAROSCOPIC;  Surgeon: Edmund BEE MD;  Location:  [facility];  Service: General   • ENDOSCOPY N/A 6/3/2018    Procedure: ESOPHAGOGASTRODUODENOSCOPY;  Surgeon: Mark I Brunner, MD;  Location:  [facility];  Service: Gastroenterology   • GASTRECTOMY      [facility]   • RIGHT OOPHORECTOMY      torque   • TONSILLECTOMY      age 17       Family History   Problem  Relation Age of Onset   • Atrial fibrillation Mother    • Heart failure Mother    • Diabetes Father    • Stroke Father    • No Known Problems Son    • Coronary artery disease Maternal Grandmother    • Valvular heart disease Maternal Grandmother    • Tuberculosis Maternal Grandfather    • Tuberculosis Paternal Grandfather    • Breast cancer Neg Hx    • Ovarian cancer Neg Hx    • Cancer Neg Hx        Social History     Socioeconomic History   • Marital status:    Tobacco Use   • Smoking status: Former     Packs/day: 0.25     Years: 0.50     Pack years: 0.13     Types: Cigarettes     Start date: 2017     Quit date: 2018     Years since quittin.1   • Smokeless tobacco: Never   • Tobacco comments:     About a third a pack a day, quit 2018   Vaping Use   • Vaping Use: Never used   Substance and Sexual Activity   • Alcohol use: Yes     Comment: about once a month 1-2 drinks at a dinner, no hx heavy use or abuse other than more social in college years   • Drug use: No   • Sexual activity: Defer       Allergies   Allergen Reactions   • Coumadin [Warfarin Sodium] Anaphylaxis     Swelling of throat. LUE DVT in  when inpatient for unknown cause, did fine on heparin gtt, home on coumadin and anaphylaxis, completed and tolerated 6 months lovenox.   • Cymbalta [Duloxetine Hcl] Other (See Comments)     Worsening depression, CNS issues   • Paxil [Paroxetine Hcl] Other (See Comments)     Didn't tolerate   • Prozac [Fluoxetine Hcl] Other (See Comments)     Didn't tolerate       ROS    Review of Systems   Constitutional: Positive for fatigue. Negative for chills and fever.   HENT: Positive for congestion, rhinorrhea and sore throat. Negative for ear pain.    Respiratory: Positive for cough. Negative for shortness of breath.    Cardiovascular: Negative for chest pain, palpitations and leg swelling.   Musculoskeletal: Negative for myalgias.   Neurological: Negative for dizziness and headache.  "  Psychiatric/Behavioral: Positive for decreased concentration and stress. Negative for sleep disturbance, suicidal ideas and depressed mood. The patient is not nervous/anxious.        Vitals:    03/31/23 1128   BP: 122/84   Pulse: 77   Temp: 96.9 °F (36.1 °C)   SpO2: 94%   Weight: 106 kg (234 lb)   Height: 165.1 cm (65\")   PainSc: 0-No pain     Body mass index is 38.94 kg/m².    Current Outpatient Medications on File Prior to Visit   Medication Sig Dispense Refill   • cholecalciferol (VITAMIN D3) 1.25 MG (72149 UT) capsule Take 1 capsule by mouth 1 (One) Time Per Week. 12 capsule 3   • hydrOXYzine (ATARAX) 25 MG tablet TAKE 1 TABLET BY MOUTH AT NIGHT AS NEEDED FOR ANXIETY. 90 tablet 1   • Multiple Vitamins-Minerals (MULTIVITAMIN ADULT PO) Take 1 tablet by mouth Daily.     • Ozempic, 2 MG/DOSE, 8 MG/3ML solution pen-injector INJECT 2MG UNDER THE SKIN ONCE A WEEK     • [DISCONTINUED] lisdexamfetamine (Vyvanse) 40 MG capsule Take 1 capsule by mouth Every Morning 30 capsule 0   • [DISCONTINUED] rOPINIRole (REQUIP) 0.5 MG tablet Take 1 hour before bedtime.  Take 1 tab for 5 days, then increase to 2 tabs nightly. 55 tablet 0   • [DISCONTINUED] busPIRone (BUSPAR) 10 MG tablet Take 10 mg by mouth Daily.       No current facility-administered medications on file prior to visit.       Results for orders placed or performed in visit on 03/31/23   POCT rapid strep A    Specimen: Swab   Result Value Ref Range    Rapid Strep A Screen Negative Negative, VALID, INVALID, Not Performed    Internal Control Passed Passed    Lot Number [id number]     Expiration Date 09-    POCT SARS-CoV-2 Antigen PACO    Specimen: Swab   Result Value Ref Range    SARS Antigen Not Detected Not Detected, Presumptive Negative    Influenza A Antigen PACO Not Detected Not Detected    Influenza B Antigen PACO Not Detected Not Detected    Internal Control Passed Passed    Lot Number [id number]     Expiration Date 03-        PE    Physical Exam  Vitals " reviewed.   Constitutional:       General: She is not in acute distress.     Appearance: She is well-developed. She is obese. She is ill-appearing. She is not diaphoretic.   HENT:      Head: Normocephalic and atraumatic.      Right Ear: Hearing, tympanic membrane, ear canal and external ear normal.      Left Ear: Hearing, tympanic membrane, ear canal and external ear normal.      Nose: Nose normal.      Right Sinus: No maxillary sinus tenderness or frontal sinus tenderness.      Left Sinus: No maxillary sinus tenderness or frontal sinus tenderness.      Mouth/Throat:      Pharynx: Uvula midline. No posterior oropharyngeal erythema.   Eyes:      Conjunctiva/sclera: Conjunctivae normal.   Cardiovascular:      Rate and Rhythm: Normal rate and regular rhythm.      Heart sounds: Normal heart sounds. No murmur heard.    No friction rub. No gallop.   Pulmonary:      Effort: Pulmonary effort is normal. No respiratory distress.      Breath sounds: Normal breath sounds.   Musculoskeletal:         General: Normal range of motion.      Cervical back: Normal range of motion.   Skin:     General: Skin is warm.      Findings: No erythema.   Neurological:      Mental Status: She is alert and oriented to person, place, and time.   Psychiatric:         Attention and Perception: Attention and perception normal.         Mood and Affect: Mood and affect normal.         Behavior: Behavior normal.         Thought Content: Thought content normal.         Cognition and Memory: Cognition and memory normal.         Judgment: Judgment normal.          A/P    Diagnoses and all orders for this visit:    1. Attention deficit disorder (ADD) without hyperactivity (Primary)  2. Binge eating disorder  On Vyvanse 40 mg daily.  Helping with attention and focus.  Vyvanse is too expensive.  Will trial Adderall XL 20 mg daily.  UDS, [facility] up-to-date.  [facility] is offline.    3. Morbid obesity (HCC)  Using Ozempic which is helping with weight loss.  Managed by  gynecologist.  Encouraged healthy eating and exercise.    4. Exposure to the flu  -     POCT SARS-CoV-2 Antigen PACO  Flu and Covid negative.    5. Depression with anxiety  -     citalopram (CeleXA) 40 MG tablet; Take 1 tablet by mouth Daily.  Dispense: 90 tablet; Refill: 3  -     buPROPion XL (Wellbutrin XL) 300 MG 24 hr tablet; Take 1 tablet by mouth Daily.  Dispense: 90 tablet; Refill: 3  Stable mood with current medications.    6. Sore throat  -     POCT rapid strep A  Strep negative.    7. RLS (restless legs syndrome)  -     rOPINIRole (REQUIP) 1 MG tablet; Take 1 tablet by mouth Every Night.  Dispense: 90 tablet; Refill: 1  Medication is working well.           Plan of care reviewed with patient at the conclusion of today's visit. Education was provided regarding diagnosis, management and any prescribed or recommended OTC medications.  Patient verbalizes understanding of and agreement with management plan.    Dictated Utilizing Dragon Dictation     Please note that portions of this note were completed with a voice recognition program.     Part of this note may be an electronic transcription/translation of spoken language to printed text using the Dragon Dictation System.    Return in about 3 months (around 6/30/2023) for Annual physical.     Jennyfer Mauricio PA-C

## 2023-04-01 LAB
ALBUMIN SERPL-MCNC: 4.2 G/DL (ref 3.5–5.2)
ALBUMIN/GLOB SERPL: 1.2 G/DL
ALP SERPL-CCNC: 97 U/L (ref 39–117)
ALT SERPL W P-5'-P-CCNC: 18 U/L (ref 1–33)
ANION GAP SERPL CALCULATED.3IONS-SCNC: 14.5 MMOL/L (ref 5–15)
AST SERPL-CCNC: 25 U/L (ref 1–32)
BILIRUB SERPL-MCNC: 0.4 MG/DL (ref 0–1.2)
BUN SERPL-MCNC: 12 MG/DL (ref 6–20)
BUN/CREAT SERPL: 12.2 (ref 7–25)
CALCIUM SPEC-SCNC: 9.2 MG/DL (ref 8.6–10.5)
CHLORIDE SERPL-SCNC: 102 MMOL/L (ref 98–107)
CHOLEST SERPL-MCNC: 161 MG/DL (ref 0–200)
CO2 SERPL-SCNC: 24.5 MMOL/L (ref 22–29)
CREAT SERPL-MCNC: 0.98 MG/DL (ref 0.57–1)
EGFRCR SERPLBLD CKD-EPI 2021: 69.2 ML/MIN/1.73
FOLATE SERPL-MCNC: 15.6 NG/ML (ref 4.78–24.2)
GLOBULIN UR ELPH-MCNC: 3.4 GM/DL
GLUCOSE SERPL-MCNC: 91 MG/DL (ref 65–99)
HDLC SERPL-MCNC: 43 MG/DL (ref 40–60)
IRON 24H UR-MRATE: 107 MCG/DL (ref 37–145)
IRON SATN MFR SERPL: 26 % (ref 20–50)
LDLC SERPL CALC-MCNC: 94 MG/DL (ref 0–100)
LDLC/HDLC SERPL: 2.13 {RATIO}
POTASSIUM SERPL-SCNC: 4.2 MMOL/L (ref 3.5–5.2)
PROT SERPL-MCNC: 7.6 G/DL (ref 6–8.5)
SODIUM SERPL-SCNC: 141 MMOL/L (ref 136–145)
TIBC SERPL-MCNC: 410 MCG/DL (ref 298–536)
TRANSFERRIN SERPL-MCNC: 275 MG/DL (ref 200–360)
TRIGL SERPL-MCNC: 132 MG/DL (ref 0–150)
TSH SERPL DL<=0.05 MIU/L-ACNC: 1.86 UIU/ML (ref 0.27–4.2)
VIT B12 BLD-MCNC: 464 PG/ML (ref 211–946)
VLDLC SERPL-MCNC: 24 MG/DL (ref 5–40)

## 2023-04-13 ENCOUNTER — TELEPHONE (OUTPATIENT)
Dept: FAMILY MEDICINE CLINIC | Facility: CLINIC | Age: 53
End: 2023-04-13
Payer: COMMERCIAL

## 2023-04-13 DIAGNOSIS — F98.8 ATTENTION DEFICIT DISORDER (ADD) WITHOUT HYPERACTIVITY: Primary | ICD-10-CM

## 2023-04-13 RX ORDER — DEXTROAMPHETAMINE SACCHARATE, AMPHETAMINE ASPARTATE MONOHYDRATE, DEXTROAMPHETAMINE SULFATE AND AMPHETAMINE SULFATE 5; 5; 5; 5 MG/1; MG/1; MG/1; MG/1
20 CAPSULE, EXTENDED RELEASE ORAL EVERY MORNING
Qty: 30 CAPSULE | Refills: 0 | Status: SHIPPED | OUTPATIENT
Start: 2023-04-13

## 2023-04-13 NOTE — TELEPHONE ENCOUNTER
Caller: Haylee Hayes    Relationship: Self    Best call back number: [phone]    What is the best time to reach you: ANY    Who are you requesting to speak with (clinical staff, provider,  specific staff member): SHARYN KRISHNAN     What was the call regarding: PATIENT WAS IN THE OFFICE ON 03.31.23.PATIENT HAD A CONVERSATION WITH SHARYN KRISHNAN. SHE SAID THAT HER VYVANSE WAS SUPPOSED TO BE SWITCHED TO THE ADDERALL AND SENT IN. [facility]'S PHARMACY HAS NOT RECEIVED THIS. PATIENT IS CALLING TO FOLLOW UP WITH THIS. IF THE ADDERALL CAN'T BE DONE SHE SAID SHE IS OK WITH SWITCHED BACK TO THE VYVANSE. PLEASE CALL PATIENT TO LET KNOW.    Do you require a callback: YES

## 2023-04-13 NOTE — TELEPHONE ENCOUNTER
Contacted patient to notify that PCP has been out of office    She reports this med change was discussed at 3/31 visit    On Vyvanse 40 mg daily.  Helping with attention and focus.  Vyvanse is too expensive.  Will trial Adderall XL 20 mg daily.  UDS, [facility] up-to-date.  [facility] is offline.    She is requesting Adderal prescribed instead

## 2023-05-15 DIAGNOSIS — F98.8 ATTENTION DEFICIT DISORDER (ADD) WITHOUT HYPERACTIVITY: ICD-10-CM

## 2023-05-15 RX ORDER — DEXTROAMPHETAMINE SACCHARATE, AMPHETAMINE ASPARTATE MONOHYDRATE, DEXTROAMPHETAMINE SULFATE AND AMPHETAMINE SULFATE 5; 5; 5; 5 MG/1; MG/1; MG/1; MG/1
20 CAPSULE, EXTENDED RELEASE ORAL EVERY MORNING
Qty: 30 CAPSULE | Refills: 0 | Status: SHIPPED | OUTPATIENT
Start: 2023-05-15

## 2023-05-15 NOTE — TELEPHONE ENCOUNTER
Caller: Haylee Hayes    Relationship: Self    Best call back number: [phone]    Requested Prescriptions:   Requested Prescriptions     Pending Prescriptions Disp Refills   • amphetamine-dextroamphetamine XR (Adderall XR) 20 MG 24 hr capsule 30 capsule 0     Sig: Take 1 capsule by mouth Every Morning        Pharmacy where request should be sent: [facility] [address] - [phone]  - [phone] FX     Last office visit with prescribing clinician: 3/31/2023   Last telemedicine visit with prescribing clinician: 4/13/2023   Next office visit with prescribing clinician: 7/11/2023     Does the patient have less than a 3 day supply:  [x] Yes  [] No    Would you like a call back once the refill request has been completed: [] Yes [x] No    If the office needs to give you a call back, can they leave a voicemail: [] Yes [x] No    Fern Kennedy Rep   05/15/23 11:10 EDT

## 2023-08-31 ENCOUNTER — OFFICE VISIT (OUTPATIENT)
Dept: FAMILY MEDICINE CLINIC | Facility: CLINIC | Age: 53
End: 2023-08-31
Payer: COMMERCIAL

## 2023-08-31 VITALS
SYSTOLIC BLOOD PRESSURE: 114 MMHG | HEART RATE: 72 BPM | WEIGHT: 209.8 LBS | DIASTOLIC BLOOD PRESSURE: 70 MMHG | BODY MASS INDEX: 34.95 KG/M2 | OXYGEN SATURATION: 98 % | HEIGHT: 65 IN

## 2023-08-31 DIAGNOSIS — F98.8 ATTENTION DEFICIT DISORDER (ADD) WITHOUT HYPERACTIVITY: ICD-10-CM

## 2023-08-31 DIAGNOSIS — R10.9 ACUTE LEFT FLANK PAIN: Primary | ICD-10-CM

## 2023-08-31 LAB
BILIRUB BLD-MCNC: NEGATIVE MG/DL
CLARITY, POC: CLEAR
COLOR UR: YELLOW
EXPIRATION DATE: ABNORMAL
GLUCOSE UR STRIP-MCNC: NEGATIVE MG/DL
KETONES UR QL: NEGATIVE
LEUKOCYTE EST, POC: NEGATIVE
Lab: ABNORMAL
NITRITE UR-MCNC: NEGATIVE MG/ML
PH UR: 6 [PH] (ref 5–8)
PROT UR STRIP-MCNC: ABNORMAL MG/DL
RBC # UR STRIP: NEGATIVE /UL
SP GR UR: 1.01 (ref 1–1.03)
UROBILINOGEN UR QL: NORMAL

## 2023-08-31 RX ORDER — DEXTROAMPHETAMINE SACCHARATE, AMPHETAMINE ASPARTATE MONOHYDRATE, DEXTROAMPHETAMINE SULFATE AND AMPHETAMINE SULFATE 5; 5; 5; 5 MG/1; MG/1; MG/1; MG/1
20 CAPSULE, EXTENDED RELEASE ORAL EVERY MORNING
Qty: 30 CAPSULE | Refills: 0 | Status: SHIPPED | OUTPATIENT
Start: 2023-08-31

## 2023-08-31 RX ORDER — ONDANSETRON 4 MG/1
4 TABLET, FILM COATED ORAL AS NEEDED
COMMUNITY
Start: 2023-06-07

## 2023-08-31 NOTE — PROGRESS NOTES
"Chief Complaint  Urinary Tract Infection (Pt states she has bladder spasm and kidney pain on the left side) and ADD (Pt needs refills on her adderall)    Subjective      History of Present Illness  Haylee is a 53 y.o. female who presents to the clinic today for ***    Objective   Vital Signs:  /70   Pulse 72   Ht 165.1 cm (65\")   Wt 95.2 kg (209 lb 12.8 oz)   SpO2 98%   BMI 34.91 kg/m²   Estimated body mass index is 34.91 kg/m² as calculated from the following:    Height as of this encounter: 165.1 cm (65\").    Weight as of this encounter: 95.2 kg (209 lb 12.8 oz).    {BMI is >= 30 and <35. (Class 1 Obesity). The following options were offered after discussion; (Optional):16982}      Physical Exam     Result Review   {The following data was reviewed by (Optional):88215}  {Ambulatory Labs (Optional):05362}  {Data reviewed (Optional):62514:::1}             Assessment and Plan  There are no diagnoses linked to this encounter.       {Time Spent (Optional):81155}    Follow Up  Return if symptoms worsen or fail to improve.  Patient was given instructions and counseling regarding her condition or for health maintenance advice. Please see specific information pulled into the AVS if appropriate.    Part of this note may be an electronic transcription/translation of spoken language to printed text using the Dragon Dictation System.  "

## 2023-08-31 NOTE — PROGRESS NOTES
"Chief Complaint  Urinary Tract Infection (Pt states she has bladder spasm and kidney pain on the left side) and ADD (Pt needs refills on her adderall)    Subjective      History of Present Illness  Haylee is a 53 y.o. female who presents to the clinic today for complaints of  bladder spams and flank pain . She has had symptoms for 2 weeks. Patient also complains of back pain. Patient denies congestion, cough, fever, headache, rhinitis, sorethroat, stomach ache, and vaginal discharge. Patient does not have a history of recurrent UTI. Patient does have a history of pyelonephritis.     The following portions of the patient's history were reviewed and updated as appropriate: allergies, current medications, past family history, past medical history, past social history, past surgical history, and problem list.    Review of Systems  Pertinent items are noted in HPI.       Objective   Vital Signs:  /70   Pulse 72   Ht 165.1 cm (65\")   Wt 95.2 kg (209 lb 12.8 oz)   SpO2 98%   BMI 34.91 kg/m²   Estimated body mass index is 34.91 kg/m² as calculated from the following:    Height as of this encounter: 165.1 cm (65\").    Weight as of this encounter: 95.2 kg (209 lb 12.8 oz).          Physical Exam  Vitals reviewed.   Constitutional:       General: She is not in acute distress.  HENT:      Head: Normocephalic and atraumatic.   Cardiovascular:      Rate and Rhythm: Normal rate and regular rhythm.      Pulses: Normal pulses.      Heart sounds: Normal heart sounds.   Pulmonary:      Effort: Pulmonary effort is normal.      Breath sounds: Normal breath sounds.   Musculoskeletal:      Cervical back: Normal range of motion and neck supple.   Skin:     General: Skin is warm and dry.   Neurological:      General: No focal deficit present.      Mental Status: She is alert.   Psychiatric:         Mood and Affect: Mood normal.         Behavior: Behavior normal.         Thought Content: Thought content normal.         Judgment: " Judgment normal.        Result Review                    Assessment and Plan  Diagnoses and all orders for this visit:    1. Acute left flank pain (Primary)  Assessment & Plan:  Results for orders placed or performed in visit on 08/31/23   POCT urinalysis dipstick, automated    Specimen: Urine   Result Value Ref Range    Color Yellow Yellow, Straw, Dark Yellow, Inocencia    Clarity, UA Clear Clear    Specific Gravity  1.015 1.005 - 1.030    pH, Urine 6.0 5.0 - 8.0    Leukocytes Negative Negative    Nitrite, UA Negative Negative    Protein, POC Trace (A) Negative mg/dL    Glucose, UA Negative Negative mg/dL    Ketones, UA Negative Negative    Urobilinogen, UA Normal Normal, 0.2 E.U./dL    Bilirubin Negative Negative    Blood, UA Negative Negative    Lot Number [id number]     Expiration Date 03/25/2024       Urinalysis negative for UTI today.  Maintain adequate hydration.  Follow up if symptoms not improving, and as needed.             Orders:  -     POCT urinalysis dipstick, automated    2. Attention deficit disorder (ADD) without hyperactivity  Assessment & Plan:  Psychological condition is unchanged.  Continue current treatment regimen.  Psychological condition  will be reassessed at the next regular appointment.    Orders:  -     amphetamine-dextroamphetamine XR (Adderall XR) 20 MG 24 hr capsule; Take 1 capsule by mouth Every Morning  Dispense: 30 capsule; Refill: 0               Follow Up  Return if symptoms worsen or fail to improve.  Patient was given instructions and counseling regarding her condition or for health maintenance advice. Please see specific information pulled into the AVS if appropriate.    Part of this note may be an electronic transcription/translation of spoken language to printed text using the Dragon Dictation System.

## 2023-09-05 PROBLEM — R10.9 ACUTE LEFT FLANK PAIN: Status: ACTIVE | Noted: 2023-09-05

## 2023-09-05 PROBLEM — F98.8 ATTENTION DEFICIT DISORDER (ADD) WITHOUT HYPERACTIVITY: Status: ACTIVE | Noted: 2023-09-05

## 2023-09-05 NOTE — ASSESSMENT & PLAN NOTE
Results for orders placed or performed in visit on 08/31/23   POCT urinalysis dipstick, automated    Specimen: Urine   Result Value Ref Range    Color Yellow Yellow, Straw, Dark Yellow, Inocencia    Clarity, UA Clear Clear    Specific Gravity  1.015 1.005 - 1.030    pH, Urine 6.0 5.0 - 8.0    Leukocytes Negative Negative    Nitrite, UA Negative Negative    Protein, POC Trace (A) Negative mg/dL    Glucose, UA Negative Negative mg/dL    Ketones, UA Negative Negative    Urobilinogen, UA Normal Normal, 0.2 E.U./dL    Bilirubin Negative Negative    Blood, UA Negative Negative    Lot Number [id number]     Expiration Date 03/25/2024       Urinalysis negative for UTI today.  Maintain adequate hydration.  Follow up if symptoms not improving, and as needed.

## 2023-10-17 DIAGNOSIS — F98.8 ATTENTION DEFICIT DISORDER (ADD) WITHOUT HYPERACTIVITY: ICD-10-CM

## 2023-10-18 ENCOUNTER — OFFICE VISIT (OUTPATIENT)
Dept: FAMILY MEDICINE CLINIC | Facility: CLINIC | Age: 53
End: 2023-10-18
Payer: COMMERCIAL

## 2023-10-18 VITALS
WEIGHT: 211.8 LBS | HEIGHT: 65 IN | DIASTOLIC BLOOD PRESSURE: 74 MMHG | BODY MASS INDEX: 35.29 KG/M2 | SYSTOLIC BLOOD PRESSURE: 114 MMHG | HEART RATE: 86 BPM | RESPIRATION RATE: 14 BRPM | OXYGEN SATURATION: 98 %

## 2023-10-18 DIAGNOSIS — F90.0 ATTENTION DEFICIT HYPERACTIVITY DISORDER (ADHD), PREDOMINANTLY INATTENTIVE TYPE: Primary | ICD-10-CM

## 2023-10-18 DIAGNOSIS — F98.8 ATTENTION DEFICIT DISORDER (ADD) WITHOUT HYPERACTIVITY: ICD-10-CM

## 2023-10-18 DIAGNOSIS — Z51.81 ENCOUNTER FOR THERAPEUTIC DRUG MONITORING: ICD-10-CM

## 2023-10-18 RX ORDER — DEXTROAMPHETAMINE SACCHARATE, AMPHETAMINE ASPARTATE MONOHYDRATE, DEXTROAMPHETAMINE SULFATE AND AMPHETAMINE SULFATE 5; 5; 5; 5 MG/1; MG/1; MG/1; MG/1
20 CAPSULE, EXTENDED RELEASE ORAL EVERY MORNING
Qty: 30 CAPSULE | Refills: 0 | Status: SHIPPED | OUTPATIENT
Start: 2023-10-18

## 2023-10-18 RX ORDER — DEXTROAMPHETAMINE SACCHARATE, AMPHETAMINE ASPARTATE MONOHYDRATE, DEXTROAMPHETAMINE SULFATE AND AMPHETAMINE SULFATE 5; 5; 5; 5 MG/1; MG/1; MG/1; MG/1
20 CAPSULE, EXTENDED RELEASE ORAL EVERY MORNING
Qty: 30 CAPSULE | OUTPATIENT
Start: 2023-10-18

## 2023-10-18 NOTE — ASSESSMENT & PLAN NOTE
Patient would like to continue her current dose of Adderall XR due to upcoming travel. Recommended taking holiday on days she does not work to help avoid medication tolerance.   This patient is on a controlled substance which improves symptoms/quality of life and is aware of the risks, benefits and possible side-effects current treatment. The patient denies any medication side-effects at this time. A controlled substance agreement will be obtained or is currently on file.There are no signs of deviation or misuse.    SHAN report reviewed and is consistent.   UDS and CSA will be updated today.   RTC in 3 months for physical, ADHD follow-up with Jennyfer.

## 2023-10-18 NOTE — TELEPHONE ENCOUNTER
Rx Refill Note  Requested Prescriptions     Pending Prescriptions Disp Refills    amphetamine-dextroamphetamine XR (ADDERALL XR) 20 MG 24 hr capsule [Pharmacy Med Name: DEXTROAMP-AMPHET ER 20 MG CAP] 30 capsule      Sig: TAKE 1 CAPSULE BY MOUTH EVERY MORNING      Last office visit with prescribing clinician: Visit date not found     Next office visit with prescribing clinician:10/18/2023  [facility]-11/09/2022  Uds-11/09/2022    Francia Butt MA  10/18/23, 08:52 EDT

## 2023-10-18 NOTE — PROGRESS NOTES
Chief Complaint   Patient presents with    Med Refill     Aderol  Pt stated that's all she needs today       HPI     Haylee Hayes is a 53 y.o. female who is here for follow-up of ADHD.     Patient of [facility].   She states in the last month, she is having all the symptoms she was having prior to starting Adderall XR last year. Hard to focus, racing thoughts. 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 She does not take holidays on weekends. She will start leave next week and is here for a refill of Adderall. Some decreased appetite but otherwise she tolerates Adderall well. No issues with sleep, heart palpitations, or hypertension. She feels her mood is controlled with wellbutrin and celexa. Her last dose of Adderall was yesterday.     Past Medical History:   Diagnosis Date    Acute respiratory failure with hypoxia 2018    Allergic Coumadin    Chest pain 2018    Chronic pain 2018    Depression     DVT (deep venous thrombosis)     left arm,  андрей    Kidney stone     around early , has had some prior to that as well, passed all of them without surgery    Obesity 2018    Peptic ulceration     Pneumonia     never had to be hospitalized for it       Past Surgical History:   Procedure Laterality Date    APPENDECTOMY      age 15, they removed appendix while doing oopherectomy     SECTION      CHOLECYSTECTOMY N/A 2018    Procedure: CHOLECYSTECTOMY LAPAROSCOPIC;  Surgeon: Edmund BEE MD;  Location:  [facility];  Service: General    ENDOSCOPY N/A 6/3/2018    Procedure: ESOPHAGOGASTRODUODENOSCOPY;  Surgeon: Mark I Brunner, MD;  Location:  [facility];  Service: Gastroenterology    GASTRECTOMY      [facility]    RIGHT OOPHORECTOMY      torque    TONSILLECTOMY      age 17       Family History   Problem Relation Age of Onset    Atrial fibrillation Mother     Heart failure Mother     Diabetes Father      Stroke Father     No Known Problems Son     Coronary artery disease Maternal Grandmother     Valvular heart disease Maternal Grandmother     Tuberculosis Maternal Grandfather     Tuberculosis Paternal Grandfather     Breast cancer Neg Hx     Ovarian cancer Neg Hx     Cancer Neg Hx        Social History     Socioeconomic History    Marital status:    Tobacco Use    Smoking status: Former     Packs/day: 0.25     Years: 0.50     Additional pack years: 0.00     Total pack years: 0.13     Types: Cigarettes     Start date: 2017     Quit date: 2018     Years since quittin.7    Smokeless tobacco: Never    Tobacco comments:     About a third a pack a day, quit 2018   Vaping Use    Vaping Use: Never used   Substance and Sexual Activity    Alcohol use: Yes     Comment: about once a month 1-2 drinks at a dinner, no hx heavy use or abuse other than more social in college years    Drug use: No    Sexual activity: Defer       Allergies   Allergen Reactions    Coumadin [Warfarin Sodium] Anaphylaxis     Swelling of throat. LUE DVT in  when inpatient for unknown cause, did fine on heparin gtt, home on coumadin and anaphylaxis, completed and tolerated 6 months lovenox.    Cymbalta [Duloxetine Hcl] Other (See Comments)     Worsening depression, CNS issues    Paxil [Paroxetine Hcl] Other (See Comments)     Didn't tolerate    Prozac [Fluoxetine Hcl] Other (See Comments)     Didn't tolerate       ROS    Review of Systems   Cardiovascular:  Negative for palpitations.   Neurological:  Negative for headache.   Psychiatric/Behavioral:  Positive for decreased concentration and stress. Negative for sleep disturbance and depressed mood. The patient is not nervous/anxious.        Vitals:    10/18/23 0952   BP: 114/74   Pulse: 86   Resp: 14   SpO2: 98%     Body mass index is 35.25 kg/m².      Current Outpatient Medications:     amphetamine-dextroamphetamine XR (Adderall XR) 20 MG 24 hr capsule, Take 1 capsule by mouth  Every Morning, Disp: 30 capsule, Rfl: 0    buPROPion XL (Wellbutrin XL) 300 MG 24 hr tablet, Take 1 tablet by mouth Daily., Disp: 90 tablet, Rfl: 3    cholecalciferol (VITAMIN D3) 1.25 MG (00524 UT) capsule, Take 1 capsule by mouth 1 (One) Time Per Week., Disp: 12 capsule, Rfl: 3    citalopram (CeleXA) 40 MG tablet, Take 1 tablet by mouth Daily., Disp: 90 tablet, Rfl: 3    estrogen, conjugated,-medroxyprogesterone (PREMPRO) 0.45-1.5 MG per tablet, Take 1 tablet by mouth Daily., Disp: 30 tablet, Rfl: 11    hydrOXYzine (ATARAX) 25 MG tablet, Take 1 tablet by mouth At Night As Needed for Anxiety., Disp: 90 tablet, Rfl: 1    Multiple Vitamins-Minerals (MULTIVITAMIN ADULT PO), Take 1 tablet by mouth Daily., Disp: , Rfl:     ondansetron (ZOFRAN) 4 MG tablet, Take 1 tablet by mouth As Needed., Disp: , Rfl:     Ozempic, 2 MG/DOSE, 8 MG/3ML solution pen-injector, INJECT 2MG UNDER THE SKIN ONCE A WEEK, Disp: , Rfl:     rOPINIRole (REQUIP) 1 MG tablet, Take 1 tablet by mouth Every Night. (Patient taking differently: Take 2 tablets by mouth Every Night.), Disp: 90 tablet, Rfl: 1    PE    Physical Exam  Vitals reviewed.   Constitutional:       General: She is not in acute distress.     Appearance: She is well-developed.   HENT:      Head: Normocephalic and atraumatic.   Eyes:      Conjunctiva/sclera: Conjunctivae normal.   Cardiovascular:      Rate and Rhythm: Normal rate and regular rhythm.      Heart sounds: Normal heart sounds. No murmur heard.  Pulmonary:      Effort: Pulmonary effort is normal.      Breath sounds: Normal breath sounds.   Musculoskeletal:      Cervical back: Normal range of motion.   Skin:     General: Skin is warm and dry.   Neurological:      Mental Status: She is alert.      Gait: Gait normal.   Psychiatric:         Speech: Speech normal.         Behavior: Behavior normal.         Results    Results for orders placed or performed in visit on 08/31/23   POCT urinalysis dipstick, automated    Specimen:  Urine   Result Value Ref Range    Color Yellow Yellow, Straw, Dark Yellow, Inocencia    Clarity, UA Clear Clear    Specific Gravity  1.015 1.005 - 1.030    pH, Urine 6.0 5.0 - 8.0    Leukocytes Negative Negative    Nitrite, UA Negative Negative    Protein, POC Trace (A) Negative mg/dL    Glucose, UA Negative Negative mg/dL    Ketones, UA Negative Negative    Urobilinogen, UA Normal Normal, 0.2 E.U./dL    Bilirubin Negative Negative    Blood, UA Negative Negative    Lot Number [id number]     Expiration Date 03/25/2024        A/P    Problem List Items Addressed This Visit          Mental Health    Attention deficit disorder (ADD) without hyperactivity - Primary    Current Assessment & Plan     Patient would like to continue her current dose of Adderall XR due to upcoming travel. Recommended taking holiday on days she does not work to help avoid medication tolerance.   This patient is on a controlled substance which improves symptoms/quality of life and is aware of the risks, benefits and possible side-effects current treatment. The patient denies any medication side-effects at this time. A controlled substance agreement will be obtained or is currently on file.There are no signs of deviation or misuse.    SHAN report reviewed and is consistent.   UDS and CSA will be updated today.   RTC in 3 months for physical, ADHD follow-up with Jennyfer.           Other Visit Diagnoses       Encounter for therapeutic drug monitoring        Relevant Orders    Compliance Drug Analysis, Ur - Urine, Clean Catch            Plan of care was reviewed with patient at the conclusion of today's visit. Education was provided regarding diagnoses, management, and the importance of keeping follow-up appointments. The patient was counseled regarding the risks, benefits, and possible side-effects of treatment. Patient and/or family express understanding and agreement with the management plan.        LEROY Hwang

## 2023-10-27 LAB — DRUGS UR: NORMAL

## 2023-11-21 DIAGNOSIS — F98.8 ATTENTION DEFICIT DISORDER (ADD) WITHOUT HYPERACTIVITY: ICD-10-CM

## 2023-11-21 NOTE — TELEPHONE ENCOUNTER
Caller: Abigail Haylee M    Relationship: Self    Best call back number: [phone]     Requested Prescriptions:   Requested Prescriptions     Pending Prescriptions Disp Refills    amphetamine-dextroamphetamine XR (Adderall XR) 20 MG 24 hr capsule 30 capsule 0     Sig: Take 1 capsule by mouth Every Morning        Pharmacy where request should be sent: [facility] - [place], [place] - [address] - [phone] [facility] [phone] FX     Last office visit with prescribing clinician: 3/31/2023   Last telemedicine visit with prescribing clinician: Visit date not found   Next office visit with prescribing clinician: Visit date not found     Additional details provided by patient: PATIENT NORMALLY NEEDS A 90 DAY PRESCRIPTION FOR THIS.     Does the patient have less than a 3 day supply:  [x] Yes  [] No    Would you like a call back once the refill request has been completed: [] Yes [x] No    If the office needs to give you a call back, can they leave a voicemail: [] Yes [x] No    Fern Kennedy Rep   11/21/23 12:36 EST

## 2023-11-22 RX ORDER — DEXTROAMPHETAMINE SACCHARATE, AMPHETAMINE ASPARTATE MONOHYDRATE, DEXTROAMPHETAMINE SULFATE AND AMPHETAMINE SULFATE 5; 5; 5; 5 MG/1; MG/1; MG/1; MG/1
20 CAPSULE, EXTENDED RELEASE ORAL EVERY MORNING
Qty: 30 CAPSULE | Refills: 0 | Status: SHIPPED | OUTPATIENT
Start: 2023-11-22

## 2023-11-22 NOTE — TELEPHONE ENCOUNTER
Rx Refill Note  Requested Prescriptions     Pending Prescriptions Disp Refills    amphetamine-dextroamphetamine XR (Adderall XR) 20 MG 24 hr capsule 30 capsule 0     Sig: Take 1 capsule by mouth Every Morning      Last office visit with prescribing clinician: 3/31/2023   Last telemedicine visit with prescribing clinician: Visit date not found   Next office visit with prescribing clinician: Visit date not found                         Would you like a call back once the refill request has been completed: [] Yes [] No    If the office needs to give you a call back, can they leave a voicemail: [] Yes [] No    Leonila Antoine MA  11/22/23, 09:31 EST      CSA:10/18/23  UDS:10/18/23

## 2023-12-18 DIAGNOSIS — F98.8 ATTENTION DEFICIT DISORDER (ADD) WITHOUT HYPERACTIVITY: ICD-10-CM

## 2023-12-18 RX ORDER — DEXTROAMPHETAMINE SACCHARATE, AMPHETAMINE ASPARTATE MONOHYDRATE, DEXTROAMPHETAMINE SULFATE AND AMPHETAMINE SULFATE 5; 5; 5; 5 MG/1; MG/1; MG/1; MG/1
20 CAPSULE, EXTENDED RELEASE ORAL EVERY MORNING
Qty: 30 CAPSULE | Refills: 0 | Status: SHIPPED | OUTPATIENT
Start: 2023-12-18

## 2023-12-18 NOTE — TELEPHONE ENCOUNTER
Caller: Hayes Haylee M    Relationship: Self    Best call back number: [phone]     Requested Prescriptions:   Requested Prescriptions     Pending Prescriptions Disp Refills    amphetamine-dextroamphetamine XR (Adderall XR) 20 MG 24 hr capsule 30 capsule 0     Sig: Take 1 capsule by mouth Every Morning        Pharmacy where request should be sent: [facility] - [place], [place] - [address] - [phone]  - [phone] FX     Last office visit with prescribing clinician: 3/31/2023   Last telemedicine visit with prescribing clinician: Visit date not found   Next office visit with prescribing clinician: Visit date not found     Additional details provided by patient: HAS 2 DAYS LEFT    Does the patient have less than a 3 day supply:  [x] Yes  [] No    Would you like a call back once the refill request has been completed: [x] Yes [] No    If the office needs to give you a call back, can they leave a voicemail: [] Yes [] No    Fern Goodman Rep   12/18/23 14:26 EST

## 2024-02-16 DIAGNOSIS — F98.8 ATTENTION DEFICIT DISORDER (ADD) WITHOUT HYPERACTIVITY: ICD-10-CM

## 2024-02-16 RX ORDER — DEXTROAMPHETAMINE SACCHARATE, AMPHETAMINE ASPARTATE MONOHYDRATE, DEXTROAMPHETAMINE SULFATE AND AMPHETAMINE SULFATE 5; 5; 5; 5 MG/1; MG/1; MG/1; MG/1
20 CAPSULE, EXTENDED RELEASE ORAL EVERY MORNING
Qty: 30 CAPSULE | Refills: 0 | Status: SHIPPED | OUTPATIENT
Start: 2024-02-16

## 2024-02-19 ENCOUNTER — TELEPHONE (OUTPATIENT)
Dept: FAMILY MEDICINE CLINIC | Facility: CLINIC | Age: 54
End: 2024-02-19
Payer: COMMERCIAL

## 2024-02-19 DIAGNOSIS — F98.8 ATTENTION DEFICIT DISORDER (ADD) WITHOUT HYPERACTIVITY: ICD-10-CM

## 2024-02-19 NOTE — TELEPHONE ENCOUNTER
Caller: Haylee Hayes    Relationship: Self    Best call back number: [phone]    Which medication are you concerned about: ADDERALL    Who prescribed you this medication: SHARYN KRISHNAN    What are your concerns: MEDICATION CAN NOT BE FILLED AT [facility] IN [place] WITH IT BEING A CONTROLLED SUBSTANCE SO PLEASE CANCEL THIS REFILL AND SEND IT TO [facility] IN [place]. PLEASE CALL PATIENT WHEN THIS HAS BEEN COMPLETED.

## 2024-02-20 RX ORDER — DEXTROAMPHETAMINE SACCHARATE, AMPHETAMINE ASPARTATE MONOHYDRATE, DEXTROAMPHETAMINE SULFATE AND AMPHETAMINE SULFATE 5; 5; 5; 5 MG/1; MG/1; MG/1; MG/1
20 CAPSULE, EXTENDED RELEASE ORAL EVERY MORNING
Qty: 30 CAPSULE | Refills: 0 | Status: SHIPPED | OUTPATIENT
Start: 2024-02-20

## 2024-04-26 DIAGNOSIS — F98.8 ATTENTION DEFICIT DISORDER (ADD) WITHOUT HYPERACTIVITY: ICD-10-CM

## 2024-04-26 NOTE — TELEPHONE ENCOUNTER
Caller: Haylee Hayes    Relationship: Self    Best call back number: [phone]     Requested Prescriptions:   Requested Prescriptions     Pending Prescriptions Disp Refills    amphetamine-dextroamphetamine XR (Adderall XR) 20 MG 24 hr capsule 30 capsule 0     Sig: Take 1 capsule by mouth Every Morning        Pharmacy where request should be sent: [id number] - [place], [place] - [address]  - [phone]  - [phone] FX     Last office visit with prescribing clinician: 3/31/2023   Last telemedicine visit with prescribing clinician: Visit date not found   Next office visit with prescribing clinician: Visit date not found     Additional details provided by patient: PATIENT HAS TWO DAYS OF MEDICATION LEFT    Does the patient have less than a 3 day supply:  [x] Yes  [] No    Would you like a call back once the refill request has been completed: [] Yes [x] No    If the office needs to give you a call back, can they leave a voicemail: [] Yes [x] No    Fern Sweet Rep   04/26/24 16:05 EDT

## 2024-04-29 RX ORDER — DEXTROAMPHETAMINE SACCHARATE, AMPHETAMINE ASPARTATE MONOHYDRATE, DEXTROAMPHETAMINE SULFATE AND AMPHETAMINE SULFATE 5; 5; 5; 5 MG/1; MG/1; MG/1; MG/1
20 CAPSULE, EXTENDED RELEASE ORAL EVERY MORNING
Qty: 30 CAPSULE | Refills: 0 | OUTPATIENT
Start: 2024-04-29

## 2024-05-01 ENCOUNTER — TELEPHONE (OUTPATIENT)
Dept: FAMILY MEDICINE CLINIC | Facility: CLINIC | Age: 54
End: 2024-05-01
Payer: COMMERCIAL

## 2024-05-01 NOTE — TELEPHONE ENCOUNTER
Unable to reach patient. Left her a voice message to call us back to schedule an office visit to continue medication.

## 2024-05-01 NOTE — TELEPHONE ENCOUNTER
----- Message from Atilio ROJO sent at 4/29/2024  4:41 PM EDT -----  Regarding: FW: prescription declined    ----- Message -----  From: Jennyfer Mauricio PA-C  Sent: 4/29/2024   9:38 AM EDT  To: [address] [facility]  Subject: prescription declined                            Patient's prescription for Adderall has been declined.  She has not been seen since October of 2023.  For Adderall prescriptions, patient must have an appointment every 3 months.  This is consistent with [place] Law.  She can do a video visit but has to be seen in person once a year at a minimum.

## 2024-06-05 ENCOUNTER — TELEMEDICINE (OUTPATIENT)
Dept: FAMILY MEDICINE CLINIC | Facility: CLINIC | Age: 54
End: 2024-06-05
Payer: COMMERCIAL

## 2024-06-05 DIAGNOSIS — F98.8 ATTENTION DEFICIT DISORDER (ADD) WITHOUT HYPERACTIVITY: ICD-10-CM

## 2024-06-05 DIAGNOSIS — F41.8 DEPRESSION WITH ANXIETY: ICD-10-CM

## 2024-06-05 DIAGNOSIS — G25.81 RLS (RESTLESS LEGS SYNDROME): ICD-10-CM

## 2024-06-05 PROCEDURE — 99214 OFFICE O/P EST MOD 30 MIN: CPT | Performed by: PHYSICIAN ASSISTANT

## 2024-06-05 RX ORDER — ROPINIROLE 2 MG/1
2 TABLET, FILM COATED ORAL NIGHTLY
Qty: 90 TABLET | Refills: 0 | Status: SHIPPED | OUTPATIENT
Start: 2024-06-05

## 2024-06-05 RX ORDER — BUPROPION HYDROCHLORIDE 300 MG/1
300 TABLET ORAL DAILY
Qty: 90 TABLET | Refills: 0 | Status: SHIPPED | OUTPATIENT
Start: 2024-06-05

## 2024-06-05 RX ORDER — CITALOPRAM 40 MG/1
40 TABLET ORAL DAILY
Qty: 90 TABLET | Refills: 0 | Status: SHIPPED | OUTPATIENT
Start: 2024-06-05

## 2024-06-05 NOTE — PROGRESS NOTES
Chief Complaint   Patient presents with    ADD       Haylee Hayes is a pleasant 54 y.o. female who is here for medication refills for her RLS, depression, anxiety and ADD.  Patient recently accepted a job in [place] and has a new patient appointment in the middle of July.  She is requesting refills of all of her medication to take until then.    Past Medical History:   Diagnosis Date    Acute respiratory failure with hypoxia 2018    Allergic Coumadin    Chest pain 2018    Chronic pain 2018    Depression     DVT (deep venous thrombosis)     left arm,  андрей    Kidney stone     around early , has had some prior to that as well, passed all of them without surgery    Obesity 2018    Peptic ulceration     Pneumonia     never had to be hospitalized for it       Past Surgical History:   Procedure Laterality Date    APPENDECTOMY      age 15, they removed appendix while doing oopherectomy     SECTION      CHOLECYSTECTOMY N/A 2018    Procedure: CHOLECYSTECTOMY LAPAROSCOPIC;  Surgeon: Edmund BEE MD;  Location:  [facility];  Service: General    ENDOSCOPY N/A 6/3/2018    Procedure: ESOPHAGOGASTRODUODENOSCOPY;  Surgeon: Mark I Brunner, MD;  Location:  [facility];  Service: Gastroenterology    GASTRECTOMY      [facility]    RIGHT OOPHORECTOMY      torque    TONSILLECTOMY      age 17       Family History   Problem Relation Age of Onset    Atrial fibrillation Mother     Heart failure Mother     Diabetes Father     Stroke Father     No Known Problems Son     Coronary artery disease Maternal Grandmother     Valvular heart disease Maternal Grandmother     Tuberculosis Maternal Grandfather     Tuberculosis Paternal Grandfather     Breast cancer Neg Hx     Ovarian cancer Neg Hx     Cancer Neg Hx        Social History     Socioeconomic History    Marital status:    Tobacco Use    Smoking status: Former     Current packs/day: 0.00     Average packs/day: 0.3  packs/day for 1.1 years (0.3 ttl pk-yrs)     Types: Cigarettes     Start date: 2017     Quit date: 2018     Years since quittin.3    Smokeless tobacco: Never    Tobacco comments:     About a third a pack a day, quit 2018   Vaping Use    Vaping status: Never Used   Substance and Sexual Activity    Alcohol use: Yes     Comment: about once a month 1-2 drinks at a dinner, no hx heavy use or abuse other than more social in college years    Drug use: No    Sexual activity: Defer       Allergies   Allergen Reactions    Coumadin [Warfarin Sodium] Anaphylaxis     Swelling of throat. LUE DVT in  when inpatient for unknown cause, did fine on heparin gtt, home on coumadin and anaphylaxis, completed and tolerated 6 months lovenox.    Cymbalta [Duloxetine Hcl] Other (See Comments)     Worsening depression, CNS issues    Paxil [Paroxetine Hcl] Other (See Comments)     Didn't tolerate    Prozac [Fluoxetine Hcl] Other (See Comments)     Didn't tolerate       ROS  Review of Systems   Psychiatric/Behavioral:  Positive for decreased concentration, depressed mood and stress. Negative for self-injury and suicidal ideas. The patient is nervous/anxious.        There were no vitals filed for this visit.  There is no height or weight on file to calculate BMI.      Current Outpatient Medications on File Prior to Visit   Medication Sig Dispense Refill    amphetamine-dextroamphetamine XR (Adderall XR) 20 MG 24 hr capsule Take 1 capsule by mouth Every Morning 30 capsule 0    cholecalciferol (VITAMIN D3) 1.25 MG (49385 UT) capsule Take 1 capsule by mouth 1 (One) Time Per Week. 12 capsule 3    estrogen, conjugated,-medroxyprogesterone (PREMPRO) 0.45-1.5 MG per tablet Take 1 tablet by mouth Daily. 30 tablet 11    Multiple Vitamins-Minerals (MULTIVITAMIN ADULT PO) Take 1 tablet by mouth Daily.      ondansetron (ZOFRAN) 4 MG tablet Take 1 tablet by mouth As Needed.      Ozempic, 2 MG/DOSE, 8 MG/3ML solution pen-injector INJECT 2MG  UNDER THE SKIN ONCE A WEEK      [DISCONTINUED] buPROPion XL (Wellbutrin XL) 300 MG 24 hr tablet Take 1 tablet by mouth Daily. 90 tablet 3    [DISCONTINUED] citalopram (CeleXA) 40 MG tablet Take 1 tablet by mouth Daily. 90 tablet 3    [DISCONTINUED] hydrOXYzine (ATARAX) 25 MG tablet Take 1 tablet by mouth At Night As Needed for Anxiety. 90 tablet 1    [DISCONTINUED] rOPINIRole (REQUIP) 1 MG tablet Take 1 tablet by mouth Every Night. (Patient taking differently: Take 2 tablets by mouth Every Night.) 90 tablet 1     No current facility-administered medications on file prior to visit.       Results for orders placed or performed in visit on 10/18/23   Compliance Drug Analysis, Ur -   Result Value Ref Range    Report Summary FINAL        PE    Physical Exam  Vitals reviewed.   Constitutional:       General: She is not in acute distress.     Appearance: Normal appearance. She is well-developed. She is not ill-appearing or diaphoretic.   HENT:      Head: Normocephalic and atraumatic.   Eyes:      Extraocular Movements: Extraocular movements intact.      Conjunctiva/sclera: Conjunctivae normal.   Pulmonary:      Effort: No respiratory distress.   Musculoskeletal:         General: Normal range of motion.      Cervical back: Normal range of motion.   Neurological:      General: No focal deficit present.      Mental Status: She is alert.   Psychiatric:         Attention and Perception: She is attentive.         Mood and Affect: Mood normal.         Speech: Speech normal.         Behavior: Behavior normal. Behavior is cooperative.         Thought Content: Thought content normal.         Judgment: Judgment normal.           A/P    Diagnoses and all orders for this visit:    1. Depression with anxiety  -     buPROPion XL (Wellbutrin XL) 300 MG 24 hr tablet; Take 1 tablet by mouth Daily.  Dispense: 90 tablet; Refill: 0  -     citalopram (CeleXA) 40 MG tablet; Take 1 tablet by mouth Daily.  Dispense: 90 tablet; Refill: 0  Stable  mood with current medications.    2. RLS (restless legs syndrome)  -     rOPINIRole (REQUIP) 2 MG tablet; Take 1 tablet by mouth Every Night.  Dispense: 90 tablet; Refill: 0  Was not getting enough benefit on 1 mg.  Had to increase and is doing well on the 2 mg nightly.    3. Attention deficit disorder (ADD) without hyperactivity  Stopped taking Adderall for awhile and noticed worsening attention and focus.  Requests 30 day supply until she can establish in [place].  Currently in [facility].  UDS and CSC are up-to-date.     You have chosen to receive care through a telehealth visit.  Do you consent to use a video/audio connection for your medical care today? Yes      Plan of care reviewed with patient at the conclusion of today's visit. Education was provided regarding diagnosis, management and any prescribed or recommended OTC medications.  Patient verbalizes understanding of and agreement with management plan.    Dictated Utilizing Dragon Dictation     Please note that portions of this note were completed with a voice recognition program.     Part of this note may be an electronic transcription/translation of spoken language to printed text using the Dragon Dictation System.    No follow-ups on file.     Jennyfer Mauricio PA-C

## 2024-06-05 NOTE — PROGRESS NOTES
No chief complaint on file.      Haylee Hayes is a pleasant 54 y.o. female who is here for    Past Medical History:   Diagnosis Date    Acute respiratory failure with hypoxia 2018    Allergic Coumadin    Chest pain 2018    Chronic pain 2018    Depression     DVT (deep venous thrombosis)     left arm, 2005 андрей    Kidney stone     around early , has had some prior to that as well, passed all of them without surgery    Obesity 2018    Peptic ulceration     Pneumonia     never had to be hospitalized for it       Past Surgical History:   Procedure Laterality Date    APPENDECTOMY      age 15, they removed appendix while doing oopherectomy     SECTION      CHOLECYSTECTOMY N/A 2018    Procedure: CHOLECYSTECTOMY LAPAROSCOPIC;  Surgeon: Edmund BEE MD;  Location:  [facility];  Service: General    ENDOSCOPY N/A 6/3/2018    Procedure: ESOPHAGOGASTRODUODENOSCOPY;  Surgeon: Mark I Brunner, MD;  Location:  [facility];  Service: Gastroenterology    GASTRECTOMY      [facility]    RIGHT OOPHORECTOMY      torque    TONSILLECTOMY      age 17       Family History   Problem Relation Age of Onset    Atrial fibrillation Mother     Heart failure Mother     Diabetes Father     Stroke Father     No Known Problems Son     Coronary artery disease Maternal Grandmother     Valvular heart disease Maternal Grandmother     Tuberculosis Maternal Grandfather     Tuberculosis Paternal Grandfather     Breast cancer Neg Hx     Ovarian cancer Neg Hx     Cancer Neg Hx        Social History     Socioeconomic History    Marital status:    Tobacco Use    Smoking status: Former     Current packs/day: 0.00     Average packs/day: 0.3 packs/day for 1.1 years (0.3 ttl pk-yrs)     Types: Cigarettes     Start date: 2017     Quit date: 2018     Years since quittin.3    Smokeless tobacco: Never    Tobacco comments:     About a third a pack a day, quit 2018   Vaping Use    Vaping  status: Never Used   Substance and Sexual Activity    Alcohol use: Yes     Comment: about once a month 1-2 drinks at a dinner, no hx heavy use or abuse other than more social in college years    Drug use: No    Sexual activity: Defer       Allergies   Allergen Reactions    Coumadin [Warfarin Sodium] Anaphylaxis     Swelling of throat. LUE DVT in 2005 when inpatient for unknown cause, did fine on heparin gtt, home on coumadin and anaphylaxis, completed and tolerated 6 months lovenox.    Cymbalta [Duloxetine Hcl] Other (See Comments)     Worsening depression, CNS issues    Paxil [Paroxetine Hcl] Other (See Comments)     Didn't tolerate    Prozac [Fluoxetine Hcl] Other (See Comments)     Didn't tolerate       ROS  Review of Systems    There were no vitals filed for this visit.  There is no height or weight on file to calculate BMI.    {Class 2 Severe Obesity (BMI >=35 and <=39.9.:[id number]}      Current Outpatient Medications on File Prior to Visit   Medication Sig Dispense Refill    amphetamine-dextroamphetamine XR (Adderall XR) 20 MG 24 hr capsule Take 1 capsule by mouth Every Morning 30 capsule 0    cholecalciferol (VITAMIN D3) 1.25 MG (04003 UT) capsule Take 1 capsule by mouth 1 (One) Time Per Week. 12 capsule 3    estrogen, conjugated,-medroxyprogesterone (PREMPRO) 0.45-1.5 MG per tablet Take 1 tablet by mouth Daily. 30 tablet 11    Multiple Vitamins-Minerals (MULTIVITAMIN ADULT PO) Take 1 tablet by mouth Daily.      ondansetron (ZOFRAN) 4 MG tablet Take 1 tablet by mouth As Needed.      Ozempic, 2 MG/DOSE, 8 MG/3ML solution pen-injector INJECT 2MG UNDER THE SKIN ONCE A WEEK      [DISCONTINUED] buPROPion XL (Wellbutrin XL) 300 MG 24 hr tablet Take 1 tablet by mouth Daily. 90 tablet 3    [DISCONTINUED] citalopram (CeleXA) 40 MG tablet Take 1 tablet by mouth Daily. 90 tablet 3    [DISCONTINUED] hydrOXYzine (ATARAX) 25 MG tablet Take 1 tablet by mouth At Night As Needed for Anxiety. 90 tablet 1    [DISCONTINUED]  rOPINIRole (REQUIP) 1 MG tablet Take 1 tablet by mouth Every Night. (Patient taking differently: Take 2 tablets by mouth Every Night.) 90 tablet 1     No current facility-administered medications on file prior to visit.       Results for orders placed or performed in visit on 10/18/23   Compliance Drug Analysis, Ur -   Result Value Ref Range    Report Summary FINAL        PE    Physical Exam      A/P    Diagnoses and all orders for this visit:    1. Depression with anxiety  -     buPROPion XL (Wellbutrin XL) 300 MG 24 hr tablet; Take 1 tablet by mouth Daily.  Dispense: 90 tablet; Refill: 0  -     citalopram (CeleXA) 40 MG tablet; Take 1 tablet by mouth Daily.  Dispense: 90 tablet; Refill: 0    2. RLS (restless legs syndrome)  -     rOPINIRole (REQUIP) 2 MG tablet; Take 1 tablet by mouth Every Night.  Dispense: 90 tablet; Refill: 0         Plan of care reviewed with patient at the conclusion of today's visit. Education was provided regarding diagnosis, management and any prescribed or recommended OTC medications.  Patient verbalizes understanding of and agreement with management plan.    Dictated Utilizing Dragon Dictation     Please note that portions of this note were completed with a voice recognition program.     Part of this note may be an electronic transcription/translation of spoken language to printed text using the Dragon Dictation System.    No follow-ups on file.     Jennyfer Mauricio PA-C

## 2024-06-06 RX ORDER — DEXTROAMPHETAMINE SACCHARATE, AMPHETAMINE ASPARTATE MONOHYDRATE, DEXTROAMPHETAMINE SULFATE AND AMPHETAMINE SULFATE 5; 5; 5; 5 MG/1; MG/1; MG/1; MG/1
20 CAPSULE, EXTENDED RELEASE ORAL EVERY MORNING
Qty: 30 CAPSULE | Refills: 0 | Status: SHIPPED | OUTPATIENT
Start: 2024-06-06

## 2024-09-19 DIAGNOSIS — G25.81 RLS (RESTLESS LEGS SYNDROME): ICD-10-CM

## 2024-09-19 RX ORDER — ROPINIROLE 2 MG/1
2 TABLET, FILM COATED ORAL NIGHTLY
Qty: 90 TABLET | Refills: 0 | Status: SHIPPED | OUTPATIENT
Start: 2024-09-19

## 2024-09-28 NOTE — TELEPHONE ENCOUNTER
Called pt, no answer.    LVM for patient to return call.    Message below from Jenynfer.   Respiratory Note     09/28/24 1200   Respiratory Assessment   Resp Comments Pt now awake and alert, ETCO2 D/C, verbal order by Dr. Luis.   O2 Device 4LPM NC   Oxygen Therapy/Pulse Ox   O2 Device EtCO2 nasal cannula   Nasal Cannula O2 Flow Rate (L/min) 4 L/min   Calculated FIO2 (%) - Nasal Cannula 36   SpO2 Activity At Rest   EtCO2   ETCO2 (mmHg) 35 mmHg   EtCO2 High (mmHg) 45 mmHg   EtCO2 Low (mmHg) 19 mmHg   RR High 35   RR Low 10   SpO2 Alarm Limit High 100   SpO2 Alarm Limit Low 88   Apnea (sec) 20 sec

## 2024-10-15 ENCOUNTER — OFFICE VISIT (OUTPATIENT)
Dept: FAMILY MEDICINE CLINIC | Facility: CLINIC | Age: 54
End: 2024-10-15
Payer: COMMERCIAL

## 2024-10-15 ENCOUNTER — TELEPHONE (OUTPATIENT)
Dept: FAMILY MEDICINE CLINIC | Facility: CLINIC | Age: 54
End: 2024-10-15

## 2024-10-15 VITALS
SYSTOLIC BLOOD PRESSURE: 108 MMHG | WEIGHT: 238.2 LBS | HEART RATE: 71 BPM | OXYGEN SATURATION: 97 % | DIASTOLIC BLOOD PRESSURE: 78 MMHG | BODY MASS INDEX: 39.69 KG/M2 | HEIGHT: 65 IN

## 2024-10-15 DIAGNOSIS — Z13.220 SCREENING FOR CHOLESTEROL LEVEL: ICD-10-CM

## 2024-10-15 DIAGNOSIS — Z12.31 ENCOUNTER FOR SCREENING MAMMOGRAM FOR MALIGNANT NEOPLASM OF BREAST: ICD-10-CM

## 2024-10-15 DIAGNOSIS — Z00.00 PHYSICAL EXAM, ANNUAL: Primary | ICD-10-CM

## 2024-10-15 DIAGNOSIS — F98.8 ATTENTION DEFICIT DISORDER (ADD) WITHOUT HYPERACTIVITY: ICD-10-CM

## 2024-10-15 DIAGNOSIS — E66.09 CLASS 2 OBESITY DUE TO EXCESS CALORIES WITHOUT SERIOUS COMORBIDITY WITH BODY MASS INDEX (BMI) OF 39.0 TO 39.9 IN ADULT: ICD-10-CM

## 2024-10-15 DIAGNOSIS — G25.81 RLS (RESTLESS LEGS SYNDROME): ICD-10-CM

## 2024-10-15 DIAGNOSIS — Z13.0 SCREENING FOR DEFICIENCY ANEMIA: ICD-10-CM

## 2024-10-15 DIAGNOSIS — R73.03 PREDIABETES: ICD-10-CM

## 2024-10-15 DIAGNOSIS — Z13.29 SCREENING FOR THYROID DISORDER: ICD-10-CM

## 2024-10-15 DIAGNOSIS — F41.8 DEPRESSION WITH ANXIETY: ICD-10-CM

## 2024-10-15 DIAGNOSIS — H60.391 ACUTE INFECTION OF RIGHT EXTERNAL EAR: ICD-10-CM

## 2024-10-15 DIAGNOSIS — Z13.1 SCREENING FOR DIABETES MELLITUS: ICD-10-CM

## 2024-10-15 DIAGNOSIS — Z51.81 ENCOUNTER FOR THERAPEUTIC DRUG LEVEL MONITORING: ICD-10-CM

## 2024-10-15 DIAGNOSIS — E66.812 CLASS 2 OBESITY DUE TO EXCESS CALORIES WITHOUT SERIOUS COMORBIDITY WITH BODY MASS INDEX (BMI) OF 39.0 TO 39.9 IN ADULT: ICD-10-CM

## 2024-10-15 PROCEDURE — 99396 PREV VISIT EST AGE 40-64: CPT | Performed by: PHYSICIAN ASSISTANT

## 2024-10-15 RX ORDER — DEXTROAMPHETAMINE SACCHARATE, AMPHETAMINE ASPARTATE MONOHYDRATE, DEXTROAMPHETAMINE SULFATE AND AMPHETAMINE SULFATE 5; 5; 5; 5 MG/1; MG/1; MG/1; MG/1
20 CAPSULE, EXTENDED RELEASE ORAL EVERY MORNING
Qty: 30 CAPSULE | Refills: 0 | Status: SHIPPED | OUTPATIENT
Start: 2024-10-15

## 2024-10-15 RX ORDER — METHYLPREDNISOLONE 4 MG
TABLET, DOSE PACK ORAL
Qty: 1 EACH | Refills: 0 | Status: SHIPPED | OUTPATIENT
Start: 2024-10-15

## 2024-10-15 RX ORDER — BUPROPION HYDROCHLORIDE 300 MG/1
300 TABLET ORAL DAILY
Qty: 90 TABLET | Refills: 0 | Status: SHIPPED | OUTPATIENT
Start: 2024-10-15

## 2024-10-15 RX ORDER — CITALOPRAM HYDROBROMIDE 40 MG/1
40 TABLET ORAL DAILY
Qty: 90 TABLET | Refills: 0 | Status: SHIPPED | OUTPATIENT
Start: 2024-10-15

## 2024-10-15 RX ORDER — CEPHALEXIN 500 MG/1
500 CAPSULE ORAL 2 TIMES DAILY
Qty: 14 CAPSULE | Refills: 0 | Status: SHIPPED | OUTPATIENT
Start: 2024-10-15 | End: 2024-10-22

## 2024-10-15 RX ORDER — DEXTROAMPHETAMINE SACCHARATE, AMPHETAMINE ASPARTATE MONOHYDRATE, DEXTROAMPHETAMINE SULFATE AND AMPHETAMINE SULFATE 5; 5; 5; 5 MG/1; MG/1; MG/1; MG/1
20 CAPSULE, EXTENDED RELEASE ORAL EVERY MORNING
Qty: 30 CAPSULE | Refills: 0 | Status: SHIPPED | OUTPATIENT
Start: 2024-10-15 | End: 2024-10-15 | Stop reason: SDUPTHER

## 2024-10-15 RX ORDER — ROPINIROLE 2 MG/1
2 TABLET, FILM COATED ORAL NIGHTLY
Qty: 90 TABLET | Refills: 0 | Status: SHIPPED | OUTPATIENT
Start: 2024-10-15

## 2024-10-15 NOTE — TELEPHONE ENCOUNTER
Called pharmacy needs to be a new rx sent to [facility]. Called and canceled rx on [facility]

## 2024-10-15 NOTE — PROGRESS NOTES
"Chief Complaint   Patient presents with    Earache    Annual Exam       Haylee Hayes is a pleasant 54 y.o. female who is here for annual physical exam.  Patient is doing well overall.  Plans on moving to [place] soon for a new job opportunity.  She is excited about the new changes.  She was previously on semaglutide and lost weight with medication.  Weight returned when she stopped taking it.  She states she tolerated the medication well.  No adverse effects.  Helped with \"food noise\" and binge eating.  Would like to try medication again.  Due for colonoscopy, mammogram.  She reports new onset right ear pain.  The pain is along the external canal and she states it feels swollen.  No known trauma/injury.  No deep ear pain or hearing loss.  No antibiotic allergies.  Has been going on for 2 weeks with no improvement.    Past Medical History:   Diagnosis Date    Acute respiratory failure with hypoxia 2018    Allergic Coumadin    Chest pain 2018    Chronic pain 2018    Depression     DVT (deep venous thrombosis)     left arm,  андрей    Kidney stone     around early , has had some prior to that as well, passed all of them without surgery    Obesity 2018    Peptic ulceration     Pneumonia     never had to be hospitalized for it       Past Surgical History:   Procedure Laterality Date    APPENDECTOMY      age 15, they removed appendix while doing oopherectomy     SECTION      CHOLECYSTECTOMY N/A 2018    Procedure: CHOLECYSTECTOMY LAPAROSCOPIC;  Surgeon: Edmund BEE MD;  Location:  [facility];  Service: General    ENDOSCOPY N/A 6/3/2018    Procedure: ESOPHAGOGASTRODUODENOSCOPY;  Surgeon: Mark I Brunner, MD;  Location:  [facility];  Service: Gastroenterology    [facility]    RIGHT OOPHORECTOMY      torque    TONSILLECTOMY      age 17       Family History   Problem Relation Age of Onset    Atrial fibrillation Mother     Heart failure " Mother     Diabetes Father     Stroke Father     No Known Problems Son     Coronary artery disease Maternal Grandmother     Valvular heart disease Maternal Grandmother     Tuberculosis Maternal Grandfather     Tuberculosis Paternal Grandfather     Breast cancer Neg Hx     Ovarian cancer Neg Hx     Cancer Neg Hx        Social History     Socioeconomic History    Marital status:    Tobacco Use    Smoking status: Former     Current packs/day: 0.00     Average packs/day: 0.3 packs/day for 1.1 years (0.3 ttl pk-yrs)     Types: Cigarettes     Start date: 2017     Quit date: 2018     Years since quittin.7    Smokeless tobacco: Never    Tobacco comments:     About a third a pack a day, quit 2018   Vaping Use    Vaping status: Never Used   Substance and Sexual Activity    Alcohol use: Yes     Comment: about once a month 1-2 drinks at a dinner, no hx heavy use or abuse other than more social in college years    Drug use: No    Sexual activity: Defer       Allergies   Allergen Reactions    Coumadin [Warfarin Sodium] Anaphylaxis     Swelling of throat. LUE DVT in  when inpatient for unknown cause, did fine on heparin gtt, home on coumadin and anaphylaxis, completed and tolerated 6 months lovenox.    Cymbalta [Duloxetine Hcl] Other (See Comments)     Worsening depression, CNS issues    Paxil [Paroxetine Hcl] Other (See Comments)     Didn't tolerate    Prozac [Fluoxetine Hcl] Other (See Comments)     Didn't tolerate       ROS  Review of Systems   Constitutional:  Negative for chills, diaphoresis, fatigue and fever.   HENT:  Positive for ear pain. Negative for congestion, ear discharge, facial swelling, hearing loss, postnasal drip, rhinorrhea and sore throat.    Eyes:  Negative for blurred vision and pain.   Respiratory:  Negative for cough, shortness of breath and wheezing.    Cardiovascular:  Negative for chest pain and leg swelling.   Gastrointestinal:  Negative for abdominal pain, blood in stool,  "constipation, diarrhea, nausea, vomiting and indigestion.   Endocrine: Negative for polyuria.   Genitourinary:  Negative for dysuria, flank pain and hematuria.   Musculoskeletal:  Negative for arthralgias, gait problem and myalgias.   Skin:  Negative for rash and skin lesions.   Neurological:  Negative for dizziness and headache.   Psychiatric/Behavioral:  Positive for decreased concentration and stress. Negative for self-injury, sleep disturbance, suicidal ideas and depressed mood. The patient is not nervous/anxious.        Vitals:    10/15/24 1253   BP: 108/78   BP Location: Left arm   Patient Position: Sitting   Cuff Size: Large Adult   Pulse: 71   SpO2: 97%   Weight: 108 kg (238 lb 3.2 oz)   Height: 165.1 cm (65\")     Body mass index is 39.64 kg/m².    Class 2 Severe Obesity (BMI >=35 and <=39.9). Obesity-related health conditions include the following: none. Obesity is worsening. BMI is is above average; BMI management plan is completed. We discussed portion control, increasing exercise, and pharmacologic options including Semaglutide .       Current Outpatient Medications on File Prior to Visit   Medication Sig Dispense Refill    amphetamine-dextroamphetamine XR (Adderall XR) 20 MG 24 hr capsule Take 1 capsule by mouth Every Morning 30 capsule 0    Multiple Vitamins-Minerals (MULTIVITAMIN ADULT PO) Take 1 tablet by mouth Daily.      [DISCONTINUED] buPROPion XL (Wellbutrin XL) 300 MG 24 hr tablet Take 1 tablet by mouth Daily. 90 tablet 0    [DISCONTINUED] citalopram (CeleXA) 40 MG tablet Take 1 tablet by mouth Daily. 90 tablet 0    cholecalciferol (VITAMIN D3) 1.25 MG (47893 UT) capsule Take 1 capsule by mouth 1 (One) Time Per Week. (Patient not taking: Reported on 10/15/2024) 12 capsule 3    estrogen, conjugated,-medroxyprogesterone (PREMPRO) 0.45-1.5 MG per tablet Take 1 tablet by mouth Daily. 30 tablet 11    [DISCONTINUED] ondansetron (ZOFRAN) 4 MG tablet Take 1 tablet by mouth As Needed. (Patient not " taking: Reported on 10/15/2024)      [DISCONTINUED] Ozempic, 2 MG/DOSE, 8 MG/3ML solution pen-injector INJECT 2MG UNDER THE SKIN ONCE A WEEK (Patient not taking: Reported on 10/15/2024)      [DISCONTINUED] rOPINIRole (REQUIP) 2 MG tablet Take 1 tablet by mouth Every Night. 90 tablet 0     No current facility-administered medications on file prior to visit.       Results for orders placed or performed in visit on 10/18/23   Compliance Drug Analysis, Ur -    Collection Time: 10/18/23 10:34 AM   Result Value Ref Range    Report Summary FINAL        PE    Physical Exam  Vitals reviewed.   Constitutional:       General: She is not in acute distress.     Appearance: Normal appearance. She is well-developed. She is obese. She is not ill-appearing or diaphoretic.   HENT:      Head: Normocephalic and atraumatic.      Right Ear: Hearing and tympanic membrane normal. No decreased hearing noted. Tenderness present. No laceration. Tympanic membrane is not erythematous.      Left Ear: Hearing, tympanic membrane, ear canal and external ear normal.      Ears:      Comments: Erythematous, swollen and tender canal.     Nose: Nose normal.      Right Sinus: No maxillary sinus tenderness or frontal sinus tenderness.      Left Sinus: No maxillary sinus tenderness or frontal sinus tenderness.      Mouth/Throat:      Pharynx: Uvula midline.   Eyes:      General: Lids are normal.      Extraocular Movements: Extraocular movements intact.      Conjunctiva/sclera: Conjunctivae normal.   Neck:      Thyroid: No thyroid mass or thyromegaly.      Trachea: Trachea and phonation normal.   Cardiovascular:      Rate and Rhythm: Normal rate and regular rhythm.      Heart sounds: Normal heart sounds.   Pulmonary:      Effort: Pulmonary effort is normal.      Breath sounds: Normal breath sounds.   Abdominal:      General: Bowel sounds are normal. There is no distension.      Palpations: Abdomen is soft. Abdomen is not rigid.      Tenderness: There is no  abdominal tenderness. There is no guarding.   Musculoskeletal:         General: Normal range of motion.      Cervical back: Normal range of motion.      Right lower leg: No edema.      Left lower leg: No edema.   Lymphadenopathy:      Cervical: No cervical adenopathy.      Right cervical: No superficial cervical adenopathy.     Left cervical: No superficial cervical adenopathy.   Skin:     General: Skin is warm.      Findings: No erythema or rash.      Nails: There is no clubbing.   Neurological:      Mental Status: She is alert and oriented to person, place, and time.      Coordination: Coordination normal.      Gait: Gait normal.      Deep Tendon Reflexes: Reflexes are normal and symmetric.      Comments: CN grossly intact   Psychiatric:         Attention and Perception: Attention and perception normal. She is attentive.         Mood and Affect: Mood and affect normal.         Speech: Speech normal.         Behavior: Behavior normal. Behavior is cooperative.         Thought Content: Thought content normal.         Cognition and Memory: Cognition and memory normal.         Judgment: Judgment normal.         A/P    Diagnoses and all orders for this visit:    1. Physical exam, annual (Primary)  -     CBC (No Diff); Future  -     Comprehensive Metabolic Panel; Future  -     TSH Rfx On Abnormal To Free T4; Future  -     Lipid Panel; Future  -     Hemoglobin A1c; Future  PE completed  Preventative labs ordered  Colonoscopy - overdue, patient declines referral today  Mammogram - referral placed  Gynecologist - established  Dentist - encouraged to go regularly  Ophthalmologist - encouraged to go regularly  Vaccinations discussed    2. Acute infection of right external ear  -     cephalexin (Keflex) 500 MG capsule; Take 1 capsule by mouth 2 (Two) Times a Day for 7 days.  Dispense: 14 capsule; Refill: 0  -     methylPREDNISolone (Medrol) 4 MG dose pack; Take as directed on package instructions. Dispense: 21 Count dose unique  "with 0 refills.  Dispense: 1 each; Refill: 0  Right external ear is erythematous and swollen.  No lesions/wounds.  TM is normal.  Will treat for ear cellulitis with keflex and steroid.  Call if symptoms do not resolve.    3. Attention deficit disorder (ADD) without hyperactivity  On Adderall 20 mg daily.  Medication helps with attention and focus.  No adverse effects.  Will update UDS, CSC and linden.  Return in 3 months.    4. Class 2 obesity due to excess calories without serious comorbidity with body mass index (BMI) of 39.0 to 39.9 in adult  Has done well in the past with Semaglutide.  Would like to trial medication again.  Insurance won't pay for it.  She would like to go through compounding pharmacy.  Denies any adverse effects in the past with medication.  Helps significantly with \"food noise\" and binge eating.  Will send in prescription to [facility].  Gave patient prescription.  Return in 3 months for monitoring.    5. Depression with anxiety  -     buPROPion XL (Wellbutrin XL) 300 MG 24 hr tablet; Take 1 tablet by mouth Daily.  Dispense: 90 tablet; Refill: 0  -     citalopram (CeleXA) 40 MG tablet; Take 1 tablet by mouth Daily.  Dispense: 90 tablet; Refill: 0  Stable, well-controlled.  Compliant on medication.    6. RLS (restless legs syndrome)  -     rOPINIRole (REQUIP) 2 MG tablet; Take 1 tablet by mouth Every Night.  Dispense: 90 tablet; Refill: 0  Stable, well-controlled.  Compliant on medication.    7. Prediabetes  -     Hemoglobin A1c; Future    8. Screening for deficiency anemia  -     CBC (No Diff); Future    9. Screening for diabetes mellitus  -     Comprehensive Metabolic Panel; Future    10. Screening for thyroid disorder  -     TSH Rfx On Abnormal To Free T4; Future    11. Screening for cholesterol level  -     Lipid Panel; Future    12. Encounter for screening mammogram for malignant neoplasm of breast  -     Mammo Screening Digital Tomosynthesis Bilateral With CAD; Future    13. Encounter for " therapeutic drug level monitoring  -     Compliance Drug Analysis, Ur - Urine, Clean Catch         Plan of care reviewed with patient at the conclusion of today's visit. Education was provided regarding nutrition , exercise, weight management, supplements, preventative screenings, and vaccinations diagnosis, management and any prescribed or recommended OTC medications.  Patient verbalizes understanding of and agreement with management plan.    Dictated Utilizing Dragon Dictation     Please note that portions of this note were completed with a voice recognition program.     Part of this note may be an electronic transcription/translation of spoken language to printed text using the Dragon Dictation System.    Return in about 3 months (around 1/15/2025) for Recheck, obesity.     Jennyfer Mauricio PA-C

## 2024-10-15 NOTE — TELEPHONE ENCOUNTER
Patient should be able to transfer this since they are both [facility] locations.

## 2024-10-15 NOTE — TELEPHONE ENCOUNTER
Caller: Haylee Hayes    Relationship: Self    Best call back number:[phone]    Which medication are you concerned about: ADDERALL 20MG    Who prescribed you this medication: SHARYN KRISHNAN    What are your concerns: PATIENT ORIGINAL PHARMACY IS OUT OF STOCK HOWEVER [facility] HAS IT IN STOCK. PLEASE SEND PRESCRIPTION TO NEW PHARMACY.

## 2024-10-25 LAB — DRUGS UR: NORMAL

## 2024-12-13 DIAGNOSIS — F98.8 ATTENTION DEFICIT DISORDER (ADD) WITHOUT HYPERACTIVITY: ICD-10-CM

## 2024-12-13 RX ORDER — DEXTROAMPHETAMINE SACCHARATE, AMPHETAMINE ASPARTATE MONOHYDRATE, DEXTROAMPHETAMINE SULFATE AND AMPHETAMINE SULFATE 5; 5; 5; 5 MG/1; MG/1; MG/1; MG/1
20 CAPSULE, EXTENDED RELEASE ORAL EVERY MORNING
Qty: 30 CAPSULE | Refills: 0 | Status: SHIPPED | OUTPATIENT
Start: 2024-12-13

## 2024-12-13 NOTE — TELEPHONE ENCOUNTER
Caller: HayesHaylee    Relationship: Self    Best call back number:       [phone] (Mobile)     Requested Prescriptions:   Requested Prescriptions     Pending Prescriptions Disp Refills    amphetamine-dextroamphetamine XR (Adderall XR) 20 MG 24 hr capsule 30 capsule 0     Sig: Take 1 capsule by mouth Every Morning      SEMAGLUTIDE INJECTION - IT WAS NOTED MEDICATION IS NOT INCLUDED ON PATIENT'S CHART    Pharmacy where ADDERALL REFILL request should be sent:     [id number] [address] AT [facility] [address] - [phone] [facility] [phone] FX     Last office visit with prescribing clinician: 10/15/2024   Last telemedicine visit with prescribing clinician: Visit date not found   Next office visit with prescribing clinician: Visit date not found     Additional details provided by patient:     PATIENT STATED SHE IS OUT OF THE ADDERALL MEDICATION AND THE SEMAGLUTIDE INJECTION    PATIENT ALSO STATED THE SEMAGLUTIDE IS TO GO TO:    [facility] [phone]    Does the patient have less than a 3 day supply:  [x] Yes  [] No    Would you like a call back once the refill request has been completed: [] Yes [] No    If the office needs to give you a call back, can they leave a voicemail: [] Yes [] No    Fern Humphries Rep   12/13/24 14:15 EST

## 2024-12-13 NOTE — TELEPHONE ENCOUNTER
Rx Refill Note  Requested Prescriptions     Pending Prescriptions Disp Refills    amphetamine-dextroamphetamine XR (Adderall XR) 20 MG 24 hr capsule 30 capsule 0     Sig: Take 1 capsule by mouth Every Morning      Last office visit with prescribing clinician: 10/15/2024   Last telemedicine visit with prescribing clinician: Visit date not found   Next office visit with prescribing clinician: Visit date not found       Francia Butt MA  12/13/24, 14:36 EST

## 2025-01-10 DIAGNOSIS — G25.81 RLS (RESTLESS LEGS SYNDROME): ICD-10-CM

## 2025-01-10 RX ORDER — ROPINIROLE 2 MG/1
2 TABLET, FILM COATED ORAL NIGHTLY
Qty: 90 TABLET | Refills: 0 | Status: SHIPPED | OUTPATIENT
Start: 2025-01-10

## 2025-01-10 NOTE — TELEPHONE ENCOUNTER
Rx Refill Note  Requested Prescriptions     Pending Prescriptions Disp Refills    rOPINIRole (REQUIP) 2 MG tablet [Pharmacy Med Name: ROPINIROLE 2MG TABLETS] 90 tablet 0     Sig: TAKE 1 TABLET BY MOUTH EVERY NIGHT      Last office visit with prescribing clinician: 10/15/2024   Last telemedicine visit with prescribing clinician: Visit date not found   Next office visit with prescribing clinician: Visit date not found                         Would you like a call back once the refill request has been completed: [] Yes [] No    If the office needs to give you a call back, can they leave a voicemail: [] Yes [] No    Leonila Antoine MA  01/10/25, 10:11 EST

## 2025-02-25 DIAGNOSIS — F41.8 DEPRESSION WITH ANXIETY: ICD-10-CM

## 2025-02-25 RX ORDER — BUPROPION HYDROCHLORIDE 300 MG/1
300 TABLET ORAL DAILY
Qty: 90 TABLET | Refills: 0 | Status: SHIPPED | OUTPATIENT
Start: 2025-02-25

## 2025-02-25 RX ORDER — CITALOPRAM HYDROBROMIDE 40 MG/1
40 TABLET ORAL DAILY
Qty: 90 TABLET | Refills: 0 | Status: SHIPPED | OUTPATIENT
Start: 2025-02-25

## 2025-02-25 NOTE — TELEPHONE ENCOUNTER
Rx Refill Note  Requested Prescriptions     Pending Prescriptions Disp Refills    buPROPion XL (WELLBUTRIN XL) 300 MG 24 hr tablet [Pharmacy Med Name: BUPROPION XL 300MG TABLETS] 90 tablet 0     Sig: TAKE 1 TABLET BY MOUTH DAILY    citalopram (CeleXA) 40 MG tablet [Pharmacy Med Name: CITALOPRAM 40MG TABLETS] 90 tablet 0     Sig: TAKE 1 TABLET BY MOUTH DAILY      Last office visit with prescribing clinician: 10/15/2024   Last telemedicine visit with prescribing clinician: Visit date not found   Next office visit with prescribing clinician: Visit date not found                         Would you like a call back once the refill request has been completed: [] Yes [] No    If the office needs to give you a call back, can they leave a voicemail: [] Yes [] No    Mercedes Rutherford MA  02/25/25, 12:31 EST

## (undated) DEVICE — DRSNG TELFA PAD NONADH STR 1S 3X8IN

## (undated) DEVICE — SOL LR 1000ML

## (undated) DEVICE — 3M™ STERI-STRIP™ REINFORCED ADHESIVE SKIN CLOSURES, R1547, 1/2 IN X 4 IN (12 MM X 100 MM), 6 STRIPS/ENVELOPE: Brand: 3M™ STERI-STRIP™

## (undated) DEVICE — COVER,LIGHT HANDLE,FLX,1/PK: Brand: MEDLINE INDUSTRIES, INC.

## (undated) DEVICE — ANTIBACTERIAL UNDYED BRAIDED (POLYGLACTIN 910), SYNTHETIC ABSORBABLE SUTURE: Brand: COATED VICRYL

## (undated) DEVICE — MEDI-VAC YANKAUER SUCTION HANDLE W/BULBOUS TIP: Brand: CARDINAL HEALTH

## (undated) DEVICE — [HIGH FLOW HEATED INSUFFLATOR TUBING,  DO NOT USE IF PACKAGE IS DAMAGED]

## (undated) DEVICE — ENDOPOUCH RETRIEVER SPECIMEN RETRIEVAL BAGS: Brand: ENDOPOUCH RETRIEVER

## (undated) DEVICE — MINI ENDOCUT SCISSOR TIP, DISPOSABLE: Brand: RENEW

## (undated) DEVICE — ENDOPATH XCEL UNIVERSAL TROCAR STABLILITY SLEEVES: Brand: ENDOPATH XCEL

## (undated) DEVICE — AIRWY 90MM NO9

## (undated) DEVICE — SINGLE-USE BIOPSY FORCEPS: Brand: RADIAL JAW 4

## (undated) DEVICE — THE BITE BLOCK MAXI, LATEX FREE STRAP IS USED TO PROTECT THE ENDOSCOPE INSERTION TUBE FROM BEING BITTEN BY THE PATIENT.

## (undated) DEVICE — ADHS LIQ MASTISOL 2/3ML

## (undated) DEVICE — 3M™ TEGADERM™ TRANSPARENT FILM DRESSING FIRST AID STYLE, 1621, 4 IN X 4-3/4 IN, 50 BAGS/CARTON, 4 CARTONS/CASE: Brand: 3M™ TEGADERM™

## (undated) DEVICE — CANNULA,OXY,ADULT,SUPERSOFT,W/7'TUB,UC: Brand: MEDLINE

## (undated) DEVICE — CANN NASL CO2 DIVIDED A/

## (undated) DEVICE — ENDOPATH XCEL BLADELESS TROCARS WITH STABILITY SLEEVES: Brand: ENDOPATH XCEL

## (undated) DEVICE — ANTIBACTERIAL UNDYED BRAIDED (POLYGLACTIN 910), SYNTHETIC ABSORBABLE SURGICAL SUTURE: Brand: COATED VICRYL

## (undated) DEVICE — PK LAP LASR CHOLE 10